# Patient Record
Sex: MALE | Race: WHITE | HISPANIC OR LATINO | Employment: OTHER | URBAN - METROPOLITAN AREA
[De-identification: names, ages, dates, MRNs, and addresses within clinical notes are randomized per-mention and may not be internally consistent; named-entity substitution may affect disease eponyms.]

---

## 2017-01-20 DIAGNOSIS — R42 VERTIGO: ICD-10-CM

## 2017-01-20 DIAGNOSIS — G50.0 TRIGEMINAL NEURALGIA OF LEFT SIDE OF FACE: ICD-10-CM

## 2017-01-20 RX ORDER — GABAPENTIN 300 MG/1
300 CAPSULE ORAL 3 TIMES DAILY
Qty: 90 CAPSULE | Refills: 11 | Status: SHIPPED | OUTPATIENT
Start: 2017-01-20 | End: 2017-04-21

## 2017-01-20 RX ORDER — MECLIZINE HCL 12.5 MG 12.5 MG/1
12.5 TABLET ORAL 2 TIMES DAILY PRN
Qty: 60 TABLET | Refills: 6 | Status: SHIPPED | OUTPATIENT
Start: 2017-01-20 | End: 2017-05-17 | Stop reason: SDUPTHER

## 2017-02-03 ENCOUNTER — OFFICE VISIT (OUTPATIENT)
Dept: PAIN MEDICINE | Facility: CLINIC | Age: 82
End: 2017-02-03
Attending: ANESTHESIOLOGY
Payer: MEDICARE

## 2017-02-03 VITALS
WEIGHT: 178.63 LBS | TEMPERATURE: 98 F | HEART RATE: 75 BPM | HEIGHT: 66 IN | SYSTOLIC BLOOD PRESSURE: 143 MMHG | DIASTOLIC BLOOD PRESSURE: 76 MMHG | BODY MASS INDEX: 28.71 KG/M2 | RESPIRATION RATE: 20 BRPM

## 2017-02-03 DIAGNOSIS — G50.0 TRIGEMINAL NEURALGIA: Primary | ICD-10-CM

## 2017-02-03 PROCEDURE — 99204 OFFICE O/P NEW MOD 45 MIN: CPT | Mod: S$PBB,,, | Performed by: ANESTHESIOLOGY

## 2017-02-03 PROCEDURE — 99999 PR PBB SHADOW E&M-EST. PATIENT-LVL III: CPT | Mod: PBBFAC,,, | Performed by: ANESTHESIOLOGY

## 2017-02-03 PROCEDURE — 99213 OFFICE O/P EST LOW 20 MIN: CPT | Mod: PBBFAC | Performed by: ANESTHESIOLOGY

## 2017-02-03 RX ORDER — GABAPENTIN 600 MG/1
TABLET ORAL
Qty: 90 TABLET | Refills: 11 | Status: SHIPPED | OUTPATIENT
Start: 2017-02-03 | End: 2017-04-21

## 2017-02-03 NOTE — PROGRESS NOTES
Chronic Pain - New Consult    Referring Physician: Referral, Self    Chief Complaint:   Chief Complaint   Patient presents with    Facial Pain     forehead to jaw        SUBJECTIVE: Disclaimer: This note has been generated using voice-recognition software. There may be typographical errors that have been missed during proof-reading    Initial encounter:    Norman Saunders presents to the clinic for the evaluation of trigeminal neuralgia pain. The pain started 2 years ago insidiously (no herpes zoster) and symptoms have been unchanged.    Brief history:    Pain Description:    The pain is located in the left side of the face, periorbitally and over the maxilla V2 distribution      At BEST  8/10     At WORST  10/10 on the WORST day.      On average pain is rated as 8/10.     Today the pain is rated as 8/10    The pain is described as burning, tingling and intermittent      Symptoms interfere with daily activity and sleeping.     Exacerbating factors: Touching.      Mitigating factors nothing.     Patient denies urinary incontinence, bowel incontinence and significant weight loss.  Patient denies any suicidal or homicidal ideations    Pain Medications:  Current:  Gabapentin 600mg at night    Tried in Past:  NSAIDs -Never  TCA -Never  SNRI -Never  Anti-convulsants -Never  Muscle Relaxants -Never  Opioids-Never    Physical Therapy/Home Exercise: no       report:  Reviewed and consistent with medication use as prescribed.    Pain Procedures: none    Chiropractor -never  Acupuncture - never  TENS unit -never  Spinal decompression -never  Joint replacement -never    Imaging: none available for review today    Past Medical History   Diagnosis Date    Cataract     GERD (gastroesophageal reflux disease)      Past Surgical History   Procedure Laterality Date    Cataract extraction       Social History     Social History    Marital status:      Spouse name: N/A    Number of children: N/A    Years of education:  N/A     Occupational History    Not on file.     Social History Main Topics    Smoking status: Never Smoker    Smokeless tobacco: Not on file    Alcohol use No    Drug use: No    Sexual activity: Not on file     Other Topics Concern    Not on file     Social History Narrative     Family History   Problem Relation Age of Onset    Amblyopia Neg Hx     Blindness Neg Hx     Cancer Neg Hx     Cataracts Neg Hx     Diabetes Neg Hx     Glaucoma Neg Hx     Hypertension Neg Hx     Macular degeneration Neg Hx     Retinal detachment Neg Hx     Strabismus Neg Hx     Stroke Neg Hx     Thyroid disease Neg Hx        Review of patient's allergies indicates:  No Known Allergies    Current Outpatient Prescriptions   Medication Sig    gabapentin (NEURONTIN) 300 MG capsule Take 1 capsule (300 mg total) by mouth 3 (three) times daily.    meclizine (ANTIVERT) 12.5 mg tablet Take 1 tablet (12.5 mg total) by mouth 2 (two) times daily as needed.    omeprazole (PRILOSEC) 20 MG capsule Take 1 capsule (20 mg total) by mouth once daily.    omeprazole (PRILOSEC) 20 MG capsule Take 1 capsule (20 mg total) by mouth once daily.    clotrimazole (LOTRIMIN) 1 % cream Apply topically 2 (two) times daily.    clotrimazole-betamethasone 1-0.05% (LOTRISONE) cream APPLY ONE CREAM EXTERNALLY TWICE DAILY AFFECTED AREA    diclofenac sodium (VOLTAREN) 1 % Gel Apply 2 g topically once daily.    econazole nitrate 1 % cream Apply topically 2 (two) times daily. To affected area x 4 wks    gabapentin (NEURONTIN) 600 MG tablet Increase to 1 tablet in AM and 1 in PM for 7 days, then increase to 1 tablet three times a day    prednisoLONE acetate (PRED FORTE) 1 % ophthalmic suspension 1 drop 4 (four) times daily.     No current facility-administered medications for this visit.        REVIEW OF SYSTEMS:    GENERAL:  No weight loss, malaise or fevers.  HEENT:   No recent changes in vision or hearing  NECK:  Negative for lumps, no difficulty  "with swallowing.  RESPIRATORY:  Negative for cough, wheezing or shortness of breath, patient denies any recent URI.  CARDIOVASCULAR:  Negative for chest pain, leg swelling or palpitations.  GI:  Negative for abdominal discomfort, blood in stools or black stools or change in bowel habits. GERD controlled with omeprazole.  MUSCULOSKELETAL:  See HPI.  SKIN:  Negative for lesions, rash, and itching.  PSYCH:  No mood disorder or recent psychosocial stressors.  Patients sleep is not disturbed secondary to pain.  HEMATOLOGY/LYMPHOLOGY:  Negative for prolonged bleeding, bruising easily or swollen nodes.  Patient is not currently taking any anti-coagulants  ENDO: No history of diabetes or thyroid dysfunction  NEURO:   No history of headaches, syncope, paralysis, seizures or tremors.  All other reviewed and negative other than HPI.    OBJECTIVE:    Visit Vitals    BP (!) 143/76    Pulse 75    Temp 98 °F (36.7 °C) (Oral)    Resp 20    Ht 5' 6" (1.676 m)    Wt 81 kg (178 lb 9.6 oz)    BMI 28.83 kg/m2       PHYSICAL EXAMINATION:    GENERAL: Well appearing, in no acute distress, alert and oriented x3.  PSYCH:  Mood and affect appropriate.  SKIN: Skin color, texture, turgor normal, no rashes or lesions.  HEAD/FACE:  Normocephalic, atraumatic. No pain to palpation over the area or evidence of zoster rash. Cranial nerves grossly intact.  NECK: No pain to palpation over the cervical paraspinous muscles. Spurling Negative. No pain with neck flexion, extension, or lateral flexion.   CV: RRR with palpation of the radial artery.  PULM: No evidence of respiratory difficulty, symmetric chest rise.  EXTREMITIES: Peripheral joint ROM is full and pain free without obvious instability or laxity in all four extremities. No deformities, edema, or skin discoloration. Good capillary refill.  MUSCULOSKELETAL: Shoulder  provocative maneuvers are negative.  There is no pain with palpation over the sacroiliac joints bilaterally.  FABERs test is " negative.  FADIRs test is negative.   Bilateral upper and lower extremity strength is normal and symmetric.  No atrophy or tone abnormalities are noted.  NEURO: Bilateral upper and lower extremity coordination and muscle stretch reflexes are physiologic and symmetric.  Plantar response are downgoing. No clonus.  No loss of sensation is noted.  GAIT: normal.    ASSESSMENT: 84 y.o. year old male with pain, consistent with     Encounter Diagnosis   Name Primary?    Trigeminal neuralgia Yes       PLAN:   Topical compounded pain cream    Escalate gabapentin to 1800 mg per day    Follow-up in 2 weeks, if there is no improvement we will set the patient up for a V2 distribution injection  The above plan and management options were discussed at length with patient. Patient is in agreement with the above and verbalized understanding. It will be communicated with the referring physician via electronic record, fax, or mail.    Sajan Truong  02/03/2017

## 2017-02-03 NOTE — MR AVS SNAPSHOT
[facility]  [address]  [place] [address]  Phone: [phone]  Fax: [phone]                  Norman Saunders   2/3/2017 11:30 AM   Office Visit    Descripción:  Male : 7/15/1932   Personal Médico:  Sajan Truong MD   Departamento:  [facility]           Razón de la azalia     Facial Pain                Lista de tareas           Citas próximas        Personal Médico Departamento Tfno del dpto    2017 11:00 AM MD [facility] [phone]      Metas (5 Years of Data)     Ninguna      Recetas para recoger        Disp Refills Start End    gabapentin (NEURONTIN) 600 MG tablet 90 tablet 11 2/3/2017     Increase to 1 tablet in AM and 1 in PM for 7 days, then increase to 1 tablet three times a day      [facility]     [facility] - Asistencia   Enfermeras registradas de [facility] pueden ayudarle a reservar krzysztof azalia, proveer educación para la rah, asesoría clínica, y otros servicios de asesoramiento.   Llame para qi servicio gratuito a [phone].             Medicamentos           Mensaje sobre Medicamentos     Verificar los cambios y / o adiciones a herr régimen de medicación son los mismos que discutir con herr médico. Si cualquiera de estos cambios o adiciones son incorrectos, por favor notifique a herr proveedor de atención médica.        EMPEZAR a elena estos medicamentos NUEVOS        Refills    gabapentin (NEURONTIN) 600 MG tablet 11    Sig: Increase to 1 tablet in AM and 1 in PM for 7 days, then increase to 1 tablet three times a day    Categoría: Print           Verifique que la siguiente lista de medicamentos es krzysztof representación exacta de los medicamentos que está tomando actualmente. Si no hay ningunos reportados, la lista puede estar en venegas. Si no es correcta, por favor póngase en contacto con herr proveedor de atención médica. Lleve esta lista con usted en berkley de emergencia.  "          Medicamentos Actuales     gabapentin (NEURONTIN) 300 MG capsule Take 1 capsule (300 mg total) by mouth 3 (three) times daily.    meclizine (ANTIVERT) 12.5 mg tablet Take 1 tablet (12.5 mg total) by mouth 2 (two) times daily as needed.    omeprazole (PRILOSEC) 20 MG capsule Take 1 capsule (20 mg total) by mouth once daily.    omeprazole (PRILOSEC) 20 MG capsule Take 1 capsule (20 mg total) by mouth once daily.    clotrimazole (LOTRIMIN) 1 % cream Apply topically 2 (two) times daily.    clotrimazole-betamethasone 1-0.05% (LOTRISONE) cream APPLY ONE CREAM EXTERNALLY TWICE DAILY AFFECTED AREA    diclofenac sodium (VOLTAREN) 1 % Gel Apply 2 g topically once daily.    econazole nitrate 1 % cream Apply topically 2 (two) times daily. To affected area x 4 wks    gabapentin (NEURONTIN) 600 MG tablet Increase to 1 tablet in AM and 1 in PM for 7 days, then increase to 1 tablet three times a day    prednisoLONE acetate (PRED FORTE) 1 % ophthalmic suspension 1 drop 4 (four) times daily.           Información de referencia clínica           Susana signos vitales gray     PS Pulso Temperatura Resp [place] Peso    143/76 75 98 °F (36.7 °C) (Oral) 20 5' 6" (1.676 m) 81 kg (178 lb 9.6 oz)    BMI (IMC)                   28.83 kg/m2           Blood Pressure          Most Recent Value    BP  (!)  143/76      Alergias     A partir del:  2/3/2017        No Known Allergies      Vacunas     Administradas en la fecha de la visita:  2/3/2017        None      Registrarse para [facility]     La activación de herr cuenta [facility] es tan fácil randy 1-2-3!    1) Ir a [email or web address], seleccione Registrarse Ahora, meter el código de activación y herr fecha de nacimiento, y seleccione Próximo.    [id number]  Expires: 3/20/2017 12:15 PM      2) Crear un nombre de usuario y contraseña para usar cuando se visita [facility] en el futuro y selecciona krzysztof pregunta de seguridad en berkley de que pierda herr contraseña y seleccione Próximo.    3) " Introduzca herr dirección de correo electrónico y betty silas en Registrarse!    Información Adicional  Si tiene alguna pregunta, por favor, e-mail [email or web address] o llame al [phone] para hablar con nuestro personal. Recuerde, [facility] no debe ser usada para necesidades urgentes. En berkley de emergencia médica, llluis al [phone].        Language Assistance Services     ATTENTION: Language assistance services are available, free of charge. Please call [phone].      ATENCIÓN: Si habla español, tiene a herr disposición servicios gratuitos de asistencia lingüística. Llame al [phone].     CHÚ Ý: N?u b?n nói Ti?ng Vi?t, có các d?ch v? h? tr? ngôn ng? mi?n phí dành cho b?n. G?i s? [phone].         [facility] cumple con las leyes federales aplicables de derechos civiles y no discrimina por motivos de gloria, color, origen nacional, edad, discapacidad, o sexo.                 Norman Saunders   2/3/2017 11:30 AM   Office Visit    Description:  Male : 7/15/1932   Provider:  Sajan Truong MD   Department:  [facility]           Reason for Visit     Facial Pain                To Do List           Future Appointments        Provider Department Dept Phone    2017 11:00 AM Sajan Truong MD [facility] [phone]      Goals     None       These Medications        Disp Refills Start End    gabapentin (NEURONTIN) 600 MG tablet 90 tablet 11 2/3/2017     Increase to 1 tablet in AM and 1 in PM for 7 days, then increase to 1 tablet three times a day      [facility]     [facility] On Call Nurse Care Line -  Assistance  Registered nurses in the [facility] On Call Center provide clinical advisement, health education, appointment booking, and other advisory services.  Call for this free service at [phone].             Medications           Message regarding Medications     Verify the changes and/or additions to your medication regime listed below  "are the same as discussed with your clinician today.  If any of these changes or additions are incorrect, please notify your healthcare provider.        START taking these NEW medications        Refills    gabapentin (NEURONTIN) 600 MG tablet 11    Sig: Increase to 1 tablet in AM and 1 in PM for 7 days, then increase to 1 tablet three times a day    Class: Print           Verify that the below list of medications is an accurate representation of the medications you are currently taking.  If none reported, the list may be blank. If incorrect, please contact your healthcare provider. Carry this list with you in case of emergency.           Current Medications     gabapentin (NEURONTIN) 300 MG capsule Take 1 capsule (300 mg total) by mouth 3 (three) times daily.    meclizine (ANTIVERT) 12.5 mg tablet Take 1 tablet (12.5 mg total) by mouth 2 (two) times daily as needed.    omeprazole (PRILOSEC) 20 MG capsule Take 1 capsule (20 mg total) by mouth once daily.    omeprazole (PRILOSEC) 20 MG capsule Take 1 capsule (20 mg total) by mouth once daily.    clotrimazole (LOTRIMIN) 1 % cream Apply topically 2 (two) times daily.    clotrimazole-betamethasone 1-0.05% (LOTRISONE) cream APPLY ONE CREAM EXTERNALLY TWICE DAILY AFFECTED AREA    diclofenac sodium (VOLTAREN) 1 % Gel Apply 2 g topically once daily.    econazole nitrate 1 % cream Apply topically 2 (two) times daily. To affected area x 4 wks    gabapentin (NEURONTIN) 600 MG tablet Increase to 1 tablet in AM and 1 in PM for 7 days, then increase to 1 tablet three times a day    prednisoLONE acetate (PRED FORTE) 1 % ophthalmic suspension 1 drop 4 (four) times daily.           Clinical Reference Information           Your Vitals Were     BP Pulse Temp Resp Height Weight    143/76 75 98 °F (36.7 °C) (Oral) 20 5' 6" (1.676 m) 81 kg (178 lb 9.6 oz)    BMI                   28.83 kg/m2           Blood Pressure          Most Recent Value    BP  (!)  143/76      Allergies as of " 2/3/2017     No Known Allergies      Immunizations Administered on Date of Encounter - 2/3/2017     None      [facility] Sign-Up     Activating your [facility] account is as easy as 1-2-3!     1) Visit [email or web address], select Sign Up Now, enter this activation code and your date of birth, then select Next.  [email or web address]  Expires: 3/20/2017 12:15 PM      2) Create a username and password to use when you visit [facility] in the future and select a security question in case you lose your password and select Next.    3) Enter your e-mail address and click Sign Up!    Additional Information  If you have questions, please e-mail [email or web address] or call [phone] to talk to our [facility] staff. Remember, [facility] is NOT to be used for urgent needs. For medical emergencies, dial 911.         Language Assistance Services     ATTENTION: Language assistance services are available, free of charge. Please call [phone].      ATENCIÓN: Si habla sunday, tiene a herr disposición servicios gratuitos de asistencia lingüística. Llame al [phone].     CHÚ Ý: N?u b?n nói Ti?ng Vi?t, có các d?ch v? h? tr? ngôn ng? mi?n phí dành cho b?n. G?i s? [phone].         [facility] complies with applicable Federal civil rights laws and does not discriminate on the basis of race, color, national origin, age, disability, or sex.

## 2017-04-07 ENCOUNTER — OFFICE VISIT (OUTPATIENT)
Dept: PAIN MEDICINE | Facility: CLINIC | Age: 82
End: 2017-04-07
Attending: ANESTHESIOLOGY
Payer: MEDICARE

## 2017-04-07 VITALS
HEART RATE: 69 BPM | BODY MASS INDEX: 29.76 KG/M2 | RESPIRATION RATE: 20 BRPM | TEMPERATURE: 99 F | WEIGHT: 185.19 LBS | DIASTOLIC BLOOD PRESSURE: 80 MMHG | HEIGHT: 66 IN | SYSTOLIC BLOOD PRESSURE: 151 MMHG

## 2017-04-07 DIAGNOSIS — G50.0 TRIGEMINAL NEURALGIA: Primary | ICD-10-CM

## 2017-04-07 PROCEDURE — 99213 OFFICE O/P EST LOW 20 MIN: CPT | Mod: S$PBB,,, | Performed by: NURSE PRACTITIONER

## 2017-04-07 PROCEDURE — 99999 PR PBB SHADOW E&M-EST. PATIENT-LVL III: CPT | Mod: PBBFAC,,, | Performed by: NURSE PRACTITIONER

## 2017-04-07 PROCEDURE — 99213 OFFICE O/P EST LOW 20 MIN: CPT | Mod: PBBFAC | Performed by: NURSE PRACTITIONER

## 2017-04-07 NOTE — MR AVS SNAPSHOT
[facility]  [address]  [place] [address]  Phone: [phone]  Fax: [phone]                  Norman Saunders   2017 1:00 PM   Office Visit    Descripción:  Male : 7/15/1932   Personal Médico:  Nancy Todd NP   Departamento:  [facility]           Razón de la azalia     Headache           Diagnósticos de Esta Visita        Comentarios    Trigeminal neuralgia    -  Primario            Lista de tareas           Citas próximas        Personal Médico Departamento Tfno del dpto    2017 1:40 PM Roverto Carias MD [facility] - Neurology [phone]      Metas (5 Years of Data)     Ninguna      [facility] en Llamada     [facility] En Llamada Línea de Enfermeras - Asistencia   Enfermeras registradas de [facility] pueden ayudarle a reservar krzysztof azalia, proveer educación para la rah, asesoría clínica, y otros servicios de asesoramiento.   Llame para qi servicio gratuito a [phone].             Medicamentos           Mensaje sobre Medicamentos     Verificar los cambios y / o adiciones a herr régimen de medicación son los mismos que discutir con herr médico. Si cualquiera de estos cambios o adiciones son incorrectos, por favor notifique a herr proveedor de atención médica.             Verifique que la siguiente lista de medicamentos es krzysztof representación exacta de los medicamentos que está tomando actualmente. Si no hay ningunos reportados, la lista puede estar en venegas. Si no es correcta, por favor póngase en contacto con herr proveedor de atención médica. Lleve esta lista con usted en berkley de emergencia.           Medicamentos Actuales     gabapentin (NEURONTIN) 300 MG capsule Take 1 capsule (300 mg total) by mouth 3 (three) times daily.    gabapentin (NEURONTIN) 600 MG tablet Increase to 1 tablet in AM and 1 in PM for 7 days, then increase to 1 tablet three times a day    meclizine (ANTIVERT) 12.5 mg tablet Take 1 tablet (12.5 mg total) by mouth 2 (two) times  "daily as needed.    omeprazole (PRILOSEC) 20 MG capsule Take 1 capsule (20 mg total) by mouth once daily.    omeprazole (PRILOSEC) 20 MG capsule Take 1 capsule (20 mg total) by mouth once daily.    clotrimazole (LOTRIMIN) 1 % cream Apply topically 2 (two) times daily.    clotrimazole-betamethasone 1-0.05% (LOTRISONE) cream APPLY ONE CREAM EXTERNALLY TWICE DAILY AFFECTED AREA    diclofenac sodium (VOLTAREN) 1 % Gel Apply 2 g topically once daily.    econazole nitrate 1 % cream Apply topically 2 (two) times daily. To affected area x 4 wks    prednisoLONE acetate (PRED FORTE) 1 % ophthalmic suspension 1 drop 4 (four) times daily.           Información de referencia clínica           Susana signos vitales gray     PS Pulso Temperatura Resp [place] Peso    151/80 69 98.5 °F (36.9 °C) (Oral) 20 5' 6" (1.676 m) 84 kg (185 lb 3.2 oz)    BMI ([facility])                   29.89 kg/m2           Blood Pressure          Most Recent Value    BP  (!)  151/80      Alergias     A partir del:  4/7/2017        No Known Allergies      Vacunas     Administradas en la fecha de la visita:  4/7/2017        None      Orders Placed During Today's Visit      Órdenes normales de esta visita    Ambulatory consult to Neurology       Registrarse para [facility]     La activación de herr cuenta [facility] es tan fácil randy 1-2-3!    1) Ir a [email or web address], seleccione Registrarse Ahora, meter el código de activación y herr fecha de nacimiento, y seleccione Próximo.    [id number]  Expires: 5/22/2017  1:50 PM      2) Crear un nombre de usuario y contraseña para usar cuando se visita [facility] en el futuro y selecciona krzysztof pregunta de seguridad en berkley de que pierda herr contraseña y seleccione Próximo.    3) Introduzca herr dirección de correo electrónico y betty clic en Registrarse!    Información Adicional  Si tiene alguna pregunta, por favor, e-mail [email or web address] o llame al [phone] para hablar con nuestro personal. Recuerde, [facility] no debe ser " usada para necesidades urgentes. En berkley de emergencia médica, llame al 911.        Instrucciones    Increase Gabapentin to:  1 pill in AM  1 pill in afternoon  2 pills at bedtime.        Language Assistance Services     ATTENTION: Language assistance services are available, free of charge. Please call [phone].      ATENCIÓN: Si habla español, tiene a herr disposición servicios gratuitos de asistencia lingüística. Llame al [phone].     CHÚ Ý: N?u b?n nói Ti?ng Vi?t, có các d?ch v? h? tr? ngôn ng? mi?n phí dành cho b?n. G?i s? [phone].         [facility] cumple con las leyes federales aplicables de derechos civiles y no discrimina por motivos de gloria, color, origen nacional, edad, discapacidad, o sexo.                 Norman Saunders   2017 1:00 PM   Office Visit    Description:  Male : 7/15/1932   Provider:  Nancy Todd NP   Department:  [facility]           Reason for Visit     Headache           Diagnoses this Visit        Comments    Trigeminal neuralgia    -  Primary            To Do List           Future Appointments        Provider Department Dept Phone    2017 1:40 PM Roverto Carias MD [facility] - Neurology [phone]      Goals     None      [facility]     [facility] Nurse Care Line -  Assistance  Unless otherwise directed by your provider, please contact [facility], our nurse care line that is available for  assistance.     Registered nurses in the [facility] provide: appointment scheduling, clinical advisement, health education, and other advisory services.  Call: [phone] (toll free)               Medications           Message regarding Medications     Verify the changes and/or additions to your medication regime listed below are the same as discussed with your clinician today.  If any of these changes or additions are incorrect, please notify your healthcare provider.             Verify that the  "below list of medications is an accurate representation of the medications you are currently taking.  If none reported, the list may be blank. If incorrect, please contact your healthcare provider. Carry this list with you in case of emergency.           Current Medications     gabapentin (NEURONTIN) 300 MG capsule Take 1 capsule (300 mg total) by mouth 3 (three) times daily.    gabapentin (NEURONTIN) 600 MG tablet Increase to 1 tablet in AM and 1 in PM for 7 days, then increase to 1 tablet three times a day    meclizine (ANTIVERT) 12.5 mg tablet Take 1 tablet (12.5 mg total) by mouth 2 (two) times daily as needed.    omeprazole (PRILOSEC) 20 MG capsule Take 1 capsule (20 mg total) by mouth once daily.    omeprazole (PRILOSEC) 20 MG capsule Take 1 capsule (20 mg total) by mouth once daily.    clotrimazole (LOTRIMIN) 1 % cream Apply topically 2 (two) times daily.    clotrimazole-betamethasone 1-0.05% (LOTRISONE) cream APPLY ONE CREAM EXTERNALLY TWICE DAILY AFFECTED AREA    diclofenac sodium (VOLTAREN) 1 % Gel Apply 2 g topically once daily.    econazole nitrate 1 % cream Apply topically 2 (two) times daily. To affected area x 4 wks    prednisoLONE acetate (PRED FORTE) 1 % ophthalmic suspension 1 drop 4 (four) times daily.           Clinical Reference Information           Your Vitals Were     BP Pulse Temp Resp Height Weight    151/80 69 98.5 °F (36.9 °C) (Oral) 20 5' 6" (1.676 m) 84 kg (185 lb 3.2 oz)    BMI                   29.89 kg/m2           Blood Pressure          Most Recent Value    BP  (!)  151/80      Allergies as of 4/7/2017     No Known Allergies      Immunizations Administered on Date of Encounter - 4/7/2017     None      Orders Placed During Today's Visit      Normal Orders This Visit    Ambulatory consult to Neurology       [facility] Sign-Up     Activating your [facility] account is as easy as 1-2-3!     1) Visit [email or web address], select Sign Up Now, enter this activation code and your date of birth, " then select Next.  [email or web address]  Expires: 5/22/2017  1:50 PM      2) Create a username and password to use when you visit [facility] in the future and select a security question in case you lose your password and select Next.    3) Enter your e-mail address and click Sign Up!    Additional Information  If you have questions, please e-mail [email or web address] or call [phone] to talk to our [facility] staff. Remember, [facility] is NOT to be used for urgent needs. For medical emergencies, dial 911.         Instructions    Increase Gabapentin to:  1 pill in AM  1 pill in afternoon  2 pills at bedtime.        Language Assistance Services     ATTENTION: Language assistance services are available, free of charge. Please call [phone].      ATENCIÓN: Si habla español, tiene a herr disposición servicios gratuitos de asistencia lingüística. Llame al [phone].     CHÚ Ý: N?u b?n nói Ti?ng Vi?t, có các d?ch v? h? tr? ngôn ng? mi?n phí dành cho b?n. G?i s? [phone].         [facility] complies with applicable Federal civil rights laws and does not discriminate on the basis of race, color, national origin, age, disability, or sex.

## 2017-04-21 ENCOUNTER — OFFICE VISIT (OUTPATIENT)
Dept: NEUROLOGY | Facility: CLINIC | Age: 82
End: 2017-04-21
Payer: MEDICARE

## 2017-04-21 VITALS
BODY MASS INDEX: 30.01 KG/M2 | WEIGHT: 186.75 LBS | SYSTOLIC BLOOD PRESSURE: 130 MMHG | HEIGHT: 66 IN | HEART RATE: 76 BPM | DIASTOLIC BLOOD PRESSURE: 73 MMHG

## 2017-04-21 DIAGNOSIS — G50.0 TRIGEMINAL NEURALGIA: Primary | ICD-10-CM

## 2017-04-21 PROCEDURE — 99213 OFFICE O/P EST LOW 20 MIN: CPT | Mod: PBBFAC | Performed by: PSYCHIATRY & NEUROLOGY

## 2017-04-21 PROCEDURE — 99999 PR PBB SHADOW E&M-EST. PATIENT-LVL III: CPT | Mod: PBBFAC,,, | Performed by: PSYCHIATRY & NEUROLOGY

## 2017-04-21 PROCEDURE — 99205 OFFICE O/P NEW HI 60 MIN: CPT | Mod: S$PBB,,, | Performed by: PSYCHIATRY & NEUROLOGY

## 2017-04-21 RX ORDER — GABAPENTIN 800 MG/1
800 TABLET ORAL 3 TIMES DAILY
Qty: 90 TABLET | Refills: 11 | Status: SHIPPED | OUTPATIENT
Start: 2017-04-21 | End: 2018-04-26 | Stop reason: SDUPTHER

## 2017-04-21 NOTE — LETTER
April 21, 2017      Nancy Todd, NP  [address] [facility]  [address]  [facility] [address]           [facility]  [address]  [place] [address]  Phone: [phone]  Fax: [phone]          Patient: Norman Saunders   MR Number: [id number]   YOB: 1932   Date of Visit: 4/21/2017       Dear Nancy Todd:    Thank you for referring Norman Saunedrs to me for evaluation. Attached you will find relevant portions of my assessment and plan of care.    If you have questions, please do not hesitate to call me. I look forward to following Norman Saunders along with you.    Sincerely,    Roverto Carias MD    Enclosure  CC:  No Recipients    If you would like to receive this communication electronically, please contact [email or web address] or [phone] to request more information on [facility] Link access.    For providers and/or their staff who would like to refer a patient to [facility], please contact us through our one-stop-shop provider referral line, [facility], at [phone].    If you feel you have received this communication in error or would no longer like to receive these types of communications, please e-mail [email or web address]

## 2017-04-21 NOTE — MR AVS SNAPSHOT
[facility]  [address]  [place] [address]  Phone: [phone]  Fax: [phone]                  Norman Saunders   2017 1:40 PM   Office Visit    Descripción:  Male : 7/15/1932   Personal Médico:  Roverto Carias MD   Departamento:  [facility]           Razón de la azalia     Consult           Diagnósticos de Esta Visita        Comentarios    Trigeminal neuralgia    -  Primario            Lista de tareas           Metas (5 Years of Data)     Ninguna      Recetas para recoger        Disp Refills Start End    gabapentin (NEURONTIN) 800 MG tablet 90 tablet 11 2017    Take 1 tablet (800 mg total) by mouth 3 (three) times daily. - Oral    [facility]: [id number] - [place], [place] - [address] AT [facility] No[address] de tlfo: #: [phone]         [facility] en Llamada     [facility] En Llamada Línea de Enfermeras - Asistencia   Enfermeras registradas de [facility] pueden ayudarle a reservar krzysztof azalia, proveer educación para la rah, asesoría clínica, y otros servicios de asesoramiento.   Llame para qi servicio gratuito a [phone].             Medicamentos           Mensaje sobre Medicamentos     Verificar los cambios y / o adiciones a herr régimen de medicación son los mismos que discutir con herr médico. Si cualquiera de estos cambios o adiciones son incorrectos, por favor notifique a herr proveedor de atención médica.        EMPEZAR a elena estos medicamentos NUEVOS        Refills    gabapentin (NEURONTIN) 800 MG tablet 11    Sig: Take 1 tablet (800 mg total) by mouth 3 (three) times daily.    Categoría: Normal    Vía: Oral      DEJAR de elena estos medicamentos     gabapentin (NEURONTIN) 300 MG capsule Take 1 capsule (300 mg total) by mouth 3 (three) times daily.    gabapentin (NEURONTIN) 100 MG capsule Take 3 capsules (300 mg total) by mouth 2 (two) times daily.           Verifique que la siguiente lista de medicamentos es krzysztof  "representación exacta de los medicamentos que está tomando actualmente. Si no hay ningunos reportados, la lista puede estar en venegas. Si no es correcta, por favor póngase en contacto con herr proveedor de atención médica. Lleve esta lista con usted en berkley de emergencia.           Medicamentos Actuales     clotrimazole (LOTRIMIN) 1 % cream Apply topically 2 (two) times daily.    clotrimazole-betamethasone 1-0.05% (LOTRISONE) cream APPLY ONE CREAM EXTERNALLY TWICE DAILY AFFECTED AREA    diclofenac sodium (VOLTAREN) 1 % Gel Apply 2 g topically once daily.    econazole nitrate 1 % cream Apply topically 2 (two) times daily. To affected area x 4 wks    meclizine (ANTIVERT) 12.5 mg tablet Take 1 tablet (12.5 mg total) by mouth 2 (two) times daily as needed.    omeprazole (PRILOSEC) 20 MG capsule Take 1 capsule (20 mg total) by mouth once daily.    omeprazole (PRILOSEC) 20 MG capsule Take 1 capsule (20 mg total) by mouth once daily.    prednisoLONE acetate (PRED FORTE) 1 % ophthalmic suspension 1 drop 4 (four) times daily.    gabapentin (NEURONTIN) 800 MG tablet Take 1 tablet (800 mg total) by mouth 3 (three) times daily.           Información de referencia clínica           Susana signos vitales gray     PS Pulso [place] Peso BMI ([facility])       130/73 76 5' 6" (1.676 m) 84.7 kg (186 lb 11.7 oz) 30.14 kg/m2       Blood Pressure          Most Recent Value    BP  130/73      Alergias     A partir del:  4/21/2017        No Known Allergies      Vacunas     Administradas en la fecha de la visita:  4/21/2017        None      Orders Placed During Today's Visit     Exámenes/Procedimientos futuros Se espera el Vence    MRI Brain Without Contrast  4/21/2017 4/21/2018      Registrarse para [facility]     La activación de herr cuenta [facility] es tan fácil randy 1-2-3!    1) Ir a [email or web address], seleccione Registrarse Ahora, meter el código de activación y herr fecha de nacimiento, y seleccione Próximo.    [id number]  Expires: 5/22/2017  " 1:50 PM      2) Crear un nombre de usuario y contraseña para usar cuando se visita [facility] en el futuro y selecciona krzysztof pregunta de seguridad en berkley de que pierda herr contraseña y seleccione Próximo.    3) Introduzca herr dirección de correo electrónico y betty silas en Registrarse!    Información Adicional  Si tiene alguna pregunta, por favor, e-mail [email or web address] o llame al [phone] para hablar con nuestro personal. Recuerde, [facility] no debe ser usada para necesidades urgentes. En berkley de emergencia médica, llame al 911.        Language Assistance Services     ATTENTION: Language assistance services are available, free of charge. Please call [phone].      ATENCIÓN: Si habla español, tiene a herr disposición servicios gratuitos de asistencia lingüística. Llame al [phone].     CHÚ Ý: N?u b?n nói Ti?ng Vi?t, có các d?ch v? h? tr? ngôn ng? mi?n phí dành cho b?n. G?i s? [phone].         [facility] cumple con las leyes federales aplicables de derechos civiles y no discrimina por motivos de gloria, color, origen nacional, edad, discapacidad, o sexo.                 Norman Saunders   2017 1:40 PM   Office Visit    Description:  Male : 7/15/1932   Provider:  Roverto Carias MD   Department:  [facility]           Reason for Visit     Consult           Diagnoses this Visit        Comments    Trigeminal neuralgia    -  Primary            To Do List           Goals     None       These Medications        Disp Refills Start End    gabapentin (NEURONTIN) 800 MG tablet 90 tablet 11 2017    Take 1 tablet (800 mg total) by mouth 3 (three) times daily. - Oral    Pharmacy: [id number] - [place] - [address] AT [facility] #: [phone]         [facility]     [facility] Nurse Care Line -  Assistance  Unless otherwise directed by your provider, please contact [facility], our nurse care line that is  available for 24/7 assistance.     Registered nurses in the [facility] provide: appointment scheduling, clinical advisement, health education, and other advisory services.  Call: [phone] (toll free)               Medications           Message regarding Medications     Verify the changes and/or additions to your medication regime listed below are the same as discussed with your clinician today.  If any of these changes or additions are incorrect, please notify your healthcare provider.        START taking these NEW medications        Refills    gabapentin (NEURONTIN) 800 MG tablet 11    Sig: Take 1 tablet (800 mg total) by mouth 3 (three) times daily.    Class: Normal    Route: Oral      STOP taking these medications     gabapentin (NEURONTIN) 300 MG capsule Take 1 capsule (300 mg total) by mouth 3 (three) times daily.    gabapentin (NEURONTIN) 100 MG capsule Take 3 capsules (300 mg total) by mouth 2 (two) times daily.           Verify that the below list of medications is an accurate representation of the medications you are currently taking.  If none reported, the list may be blank. If incorrect, please contact your healthcare provider. Carry this list with you in case of emergency.           Current Medications     clotrimazole (LOTRIMIN) 1 % cream Apply topically 2 (two) times daily.    clotrimazole-betamethasone 1-0.05% (LOTRISONE) cream APPLY ONE CREAM EXTERNALLY TWICE DAILY AFFECTED AREA    diclofenac sodium (VOLTAREN) 1 % Gel Apply 2 g topically once daily.    econazole nitrate 1 % cream Apply topically 2 (two) times daily. To affected area x 4 wks    meclizine (ANTIVERT) 12.5 mg tablet Take 1 tablet (12.5 mg total) by mouth 2 (two) times daily as needed.    omeprazole (PRILOSEC) 20 MG capsule Take 1 capsule (20 mg total) by mouth once daily.    omeprazole (PRILOSEC) 20 MG capsule Take 1 capsule (20 mg total) by mouth once daily.    prednisoLONE acetate (PRED FORTE) 1 % ophthalmic  "suspension 1 drop 4 (four) times daily.    gabapentin (NEURONTIN) 800 MG tablet Take 1 tablet (800 mg total) by mouth 3 (three) times daily.           Clinical Reference Information           Your Vitals Were     BP Pulse Height Weight BMI       130/73 76 5' 6" (1.676 m) 84.7 kg (186 lb 11.7 oz) 30.14 kg/m2       Blood Pressure          Most Recent Value    BP  130/73      Allergies as of 4/21/2017     No Known Allergies      Immunizations Administered on Date of Encounter - 4/21/2017     None      Orders Placed During Today's Visit     Future Labs/Procedures Expected by Expires    MRI Brain Without Contrast  4/21/2017 4/21/2018      [facility] Sign-Up     Activating your [facility] account is as easy as 1-2-3!     1) Visit [email or web address], select Sign Up Now, enter this activation code and your date of birth, then select Next.  [email or web address]  Expires: 5/22/2017  1:50 PM      2) Create a username and password to use when you visit [facility] in the future and select a security question in case you lose your password and select Next.    3) Enter your e-mail address and click Sign Up!    Additional Information  If you have questions, please e-mail [email or web address] or call [phone] to talk to our [facility] staff. Remember, [facility] is NOT to be used for urgent needs. For medical emergencies, dial 911.         Language Assistance Services     ATTENTION: Language assistance services are available, free of charge. Please call [phone].      ATENCIÓN: Si habla español, tiene a herr disposición servicios gratuitos de asistencia lingüística. Llame al [phone].     KELSEY Ý: N?u b?n nói Ti?ng Vi?t, có các d?ch v? h? tr? ngôn ng? mi?n phí dành cho b?n. G?i s? [phone].         [facility] complies with applicable Federal civil rights laws and does not discriminate on the basis of race, color, national origin, age, disability, or sex.          "

## 2017-04-21 NOTE — PROGRESS NOTES
[facility] NEUROLOGY  [facility] [place]    Date: April 21, 2017   Patient Name: Norman Saunders   MRN: [id number]   PCP: Elis Turner  Referring Provider: Nancy Todd NP    Assessment:      This is Norman Saunders, 84 y.o. male with trigeminal neuralgia for four years referred from pain clinic after he declined injections.  We discussed etiology of TN and discussed alternatives such as LTG and CBZ, he prefers to continue on GBP only     Plan:      MRI brain to rule out compressive lesion  Continue GBP  Follow up as needed       I discussed side effects of the medications. I asked the patient to stop the medication if hhe notices serious adverse effects as we discussed and to seek immediate medical attention at an ER.     Roverto Carias MD  [facility]   Department of Neurology    Subjective:     HPI:   Mr. Norman Saunders is a 84 y.o. male who presents with a chief complaint of trigeminal neuralgia    He developed gradual onset left facial pain most prominent in V2 distribution about four years ago without significant recent worsening.  Reports it is constant without exacerbating/alleviating factors.  Was started on GBP with titration to 2400mg/day by PCP with partial relief.      PAST MEDICAL HISTORY:  Past Medical History:   Diagnosis Date    Cataract     GERD (gastroesophageal reflux disease)        PAST SURGICAL HISTORY:  Past Surgical History:   Procedure Laterality Date    CATARACT EXTRACTION         CURRENT MEDS:  Current Outpatient Prescriptions   Medication Sig Dispense Refill    clotrimazole (LOTRIMIN) 1 % cream Apply topically 2 (two) times daily. 45 g 1    clotrimazole-betamethasone 1-0.05% (LOTRISONE) cream APPLY ONE CREAM EXTERNALLY TWICE DAILY AFFECTED AREA 45 g 3    diclofenac sodium (VOLTAREN) 1 % Gel Apply 2 g topically once daily. 100 g 1    econazole nitrate 1 % cream Apply topically 2 (two) times daily. To affected area x 4 wks 85 g 3  "   meclizine (ANTIVERT) 12.5 mg tablet Take 1 tablet (12.5 mg total) by mouth 2 (two) times daily as needed. 60 tablet 6    omeprazole (PRILOSEC) 20 MG capsule Take 1 capsule (20 mg total) by mouth once daily. 90 capsule 2    omeprazole (PRILOSEC) 20 MG capsule Take 1 capsule (20 mg total) by mouth once daily. 90 capsule 3    prednisoLONE acetate (PRED FORTE) 1 % ophthalmic suspension 1 drop 4 (four) times daily.      gabapentin (NEURONTIN) 800 MG tablet Take 1 tablet (800 mg total) by mouth 3 (three) times daily. 90 tablet 11     No current facility-administered medications for this visit.        ALLERGIES:  Review of patient's allergies indicates:  No Known Allergies    FAMILY HISTORY:  Family History   Problem Relation Age of Onset    Amblyopia Neg Hx     Blindness Neg Hx     Cancer Neg Hx     Cataracts Neg Hx     Diabetes Neg Hx     Glaucoma Neg Hx     Hypertension Neg Hx     Macular degeneration Neg Hx     Retinal detachment Neg Hx     Strabismus Neg Hx     Stroke Neg Hx     Thyroid disease Neg Hx        SOCIAL HISTORY:  Social History   Substance Use Topics    Smoking status: Never Smoker    Smokeless tobacco: None    Alcohol use No       Review of Systems:  12 review of systems is negative except for the symptoms mentioned in HPI.        Objective:     Vitals:    04/21/17 1308   BP: 130/73   Pulse: 76   Weight: 84.7 kg (186 lb 11.7 oz)   Height: 5' 6" (1.676 m)       General: NAD, well nourished   Eyes: no tearing, discharge, no erythema   ENT: moist mucous membranes of the oral cavity, nares patent    Neck: Supple, full range of motion  Cardiovascular: Warm and well perfused, pulses equal and symmetrical  Lungs: Normal work of breathing, normal chest wall excursions  Skin: No rash, lesions, or breakdown on exposed skin  Psychiatry: Mood and affect are appropriate   Abdomen: soft, non tender, non distended  Extremeties: No cyanosis, clubbing or edema.    Neurological   MENTAL STATUS: Alert " and oriented to person, place, and time. Attention and concentration within normal limits. Speech without dysarthria, able to name and repeat without difficulty. Recent and remote memory within normal limits   CRANIAL NERVES: Visual fields intact. PERRL. EOMI. Facial sensation intact. Face symmetrical. Hearing grossly intact. Full shoulder shrug bilaterally. Tongue protrudes midline   SENSORY: Sensation is intact to light touch throughout.  Joint position perception intact. Negative Romberg.   MOTOR: Normal bulk and tone. No pronator drift.  5/5 deltoid, biceps, triceps, interosseous, hand  bilaterally. 5/5 iliopsoas, knee extension/flexion, foot dorsi/plantarflexion bilaterally.    REFLEXES:  Toes down going bilaterally.   CEREBELLAR/COORDINATION/GAIT: Gait steady with normal arm swing and stride length.  Heel to shin intact. Finger to nose intact. Normal rapid alternating movements.

## 2017-05-16 ENCOUNTER — HOSPITAL ENCOUNTER (OUTPATIENT)
Dept: RADIOLOGY | Facility: HOSPITAL | Age: 82
Discharge: HOME | End: 2017-05-16
Attending: PSYCHIATRY & NEUROLOGY
Payer: MEDICARE

## 2017-05-16 DIAGNOSIS — G50.0 TRIGEMINAL NEURALGIA: ICD-10-CM

## 2017-05-16 PROCEDURE — 70551 MRI BRAIN STEM W/O DYE: CPT | Mod: 26,GC,, | Performed by: RADIOLOGY

## 2017-05-16 PROCEDURE — 70551 MRI BRAIN STEM W/O DYE: CPT | Mod: TC

## 2017-05-17 DIAGNOSIS — R42 VERTIGO: ICD-10-CM

## 2017-05-17 DIAGNOSIS — K21.9 GASTROESOPHAGEAL REFLUX DISEASE, ESOPHAGITIS PRESENCE NOT SPECIFIED: ICD-10-CM

## 2017-05-18 RX ORDER — MECLIZINE HCL 12.5 MG 12.5 MG/1
12.5 TABLET ORAL 2 TIMES DAILY PRN
Qty: 60 TABLET | Refills: 6 | Status: SHIPPED | OUTPATIENT
Start: 2017-05-18 | End: 2019-04-05

## 2017-05-18 RX ORDER — OMEPRAZOLE 20 MG/1
20 CAPSULE, DELAYED RELEASE ORAL DAILY
Qty: 90 CAPSULE | Refills: 2 | Status: SHIPPED | OUTPATIENT
Start: 2017-05-18 | End: 2021-04-12

## 2017-05-19 RX ORDER — MECLIZINE HCL 12.5 MG 12.5 MG/1
TABLET ORAL
Qty: 60 TABLET | Refills: 0 | Status: SHIPPED | OUTPATIENT
Start: 2017-05-19 | End: 2019-04-05

## 2017-05-24 ENCOUNTER — TELEPHONE (OUTPATIENT)
Dept: NEUROLOGY | Facility: CLINIC | Age: 82
End: 2017-05-24

## 2017-05-24 NOTE — TELEPHONE ENCOUNTER
Spoke with patient Daughter Lillie stated that she was  Calling to get the MRI Result her  Father had Done on 05/16/2017

## 2018-01-31 RX ORDER — OMEPRAZOLE 20 MG/1
CAPSULE, DELAYED RELEASE ORAL
Qty: 90 CAPSULE | Refills: 2 | Status: SHIPPED | OUTPATIENT
Start: 2018-01-31 | End: 2018-12-14 | Stop reason: SDUPTHER

## 2018-04-26 DIAGNOSIS — G50.0 TRIGEMINAL NEURALGIA: ICD-10-CM

## 2018-04-26 RX ORDER — GABAPENTIN 800 MG/1
800 TABLET ORAL 3 TIMES DAILY
Qty: 90 TABLET | Refills: 8 | Status: SHIPPED | OUTPATIENT
Start: 2018-04-26 | End: 2019-03-28 | Stop reason: SDUPTHER

## 2018-06-07 ENCOUNTER — OFFICE VISIT (OUTPATIENT)
Dept: OPTOMETRY | Facility: CLINIC | Age: 83
End: 2018-06-07
Payer: MEDICARE

## 2018-06-07 DIAGNOSIS — Z96.1 PSEUDOPHAKIA: ICD-10-CM

## 2018-06-07 DIAGNOSIS — H43.393 VITREOUS SYNERESIS OF BOTH EYES: ICD-10-CM

## 2018-06-07 DIAGNOSIS — Z98.41 S/P CATARACT SURGERY, RIGHT: ICD-10-CM

## 2018-06-07 DIAGNOSIS — H52.203 ASTIGMATISM OF BOTH EYES, UNSPECIFIED TYPE: ICD-10-CM

## 2018-06-07 DIAGNOSIS — Z98.42 S/P CATARACT SURGERY, LEFT: ICD-10-CM

## 2018-06-07 DIAGNOSIS — H43.393 VITREOUS FLOATERS OF BOTH EYES: Primary | ICD-10-CM

## 2018-06-07 PROCEDURE — 92015 DETERMINE REFRACTIVE STATE: CPT | Mod: ,,, | Performed by: OPTOMETRIST

## 2018-06-07 PROCEDURE — 92014 COMPRE OPH EXAM EST PT 1/>: CPT | Mod: S$PBB,,, | Performed by: OPTOMETRIST

## 2018-06-07 PROCEDURE — 99212 OFFICE O/P EST SF 10 MIN: CPT | Mod: PBBFAC | Performed by: OPTOMETRIST

## 2018-06-07 PROCEDURE — 99999 PR PBB SHADOW E&M-EST. PATIENT-LVL II: CPT | Mod: PBBFAC,,, | Performed by: OPTOMETRIST

## 2018-06-07 NOTE — LETTER
Latricia 10, 2018      Arturo Stewart MD  [address]  [facility] [address]           [facility]  [address]  [facility] [address]  Phone: [phone]  Fax: [phone]          Patient: Norman Saunders   MR Number: [id number]   YOB: 1932   Date of Visit: 6/7/2018       Dear Dr. Arturo Stewart:    Thank you for referring Norman Saunders to me for evaluation. Attached you will find relevant portions of my assessment and plan of care.    If you have questions, please do not hesitate to call me. I look forward to following Norman Saunders along with you.    Sincerely,    Nathan Worley, OD    Enclosure  CC:  No Recipients    If you would like to receive this communication electronically, please contact [email or web address] or [phone] to request more information on [employer] access.    For providers and/or their staff who would like to refer a patient to [facility], please contact us through our one-stop-shop provider referral line, [facility], [place], at [phone].    If you feel you have received this communication in error or would no longer like to receive these types of communications, please e-mail [email or web address]

## 2018-06-07 NOTE — PROGRESS NOTES
HPI     Concerns About Ocular Health    Additional comments: Eye exam and refraction.    Wears glasses full-time  VA with glasses difficult distance and near.            Comments   82 yr old  male -  with  to translate             Approximate date of last eye examination: 4/2/2015  Name of last eye doctor seen:Dr. Worley    Wears glasses? yes     If yes, wears full-time or part-time?  Full time    Present glasses are bifocal / S V Distance / S V Reading:  bifocals  Approximate age of present glasses:  2 yrs  Got new glasses following last exam, or subsequently?:  no   Any problem with VA with glasses?  Patient would like a new rx for   glasses          Wears CLs?:  no  Headaches? no  Eye pain/discomfort? No                                                                             Flashes?  Yes, temporal OS occasionally  Floaters?  Yes, temporal OS occasionally  Diplopia/Double vision?  no  Patient's Ocular History:         Any eye surgeries? S/p cataract sx OU         Any eye injury?  no         Any treatment for eye disease?  No            Family history of eye disease?  none  Significant patient medical history:         1. Diabetes?  no   2. HBP?  no              3. Other (describe):  GERD   ! OTC eyedrops currently using:  no   ! Prescription eye meds currently using:  no   ! Any history of allergy/adverse reaction to any eye meds used   previously?  no    ! Any history of allergy/adverse reaction to eyedrops used during prior   eye exam(s)? no    ! Any history of allergy/adverse reaction to Novacaine or similar meds?   Doesn't  know   ! Any history of allergy/adverse reaction to Epinephrine or similar meds?   Doesn't know    ! Patient okay with use of anesthetic eyedrops to check eye pressure? yes            ! Patient okay with use of eyedrops to dilate pupils today? yes    !  Allergies/Medications/Medical History/Family History reviewed today?    yes      PD =  64/60  Desired reading  "distance = 14"                                                                      Last edited by Nathan Worley, OD on 6/7/2018  2:29 PM. (History)            Assessment /Plan     For exam results, see Encounter Report.    1. Vitreous floaters of both eyes     2. Vitreous syneresis of both eyes     3. S/P cataract surgery, left     4. S/P cataract surgery, right     5. Pseudophakia - Both Eyes     6. Astigmatism of both eyes, unspecified type                  S/P cataract surgery in both eyes. Posterior chamber intraocular lens in each eye.  Mild syneresis of vitreous in each eye, with floaters in each eye.    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  Recheck in one year - or prior if any problems noted in the interim.               "

## 2018-06-07 NOTE — PATIENT INSTRUCTIONS
S/P cataract surgery in both eyes. Posterior chamber intraocular lens in each eye.  Mild syneresis of vitreous in each eye, with floaters in each eye.    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  Recheck in one year - or prior if any problems noted in the interim.

## 2018-07-20 RX ORDER — MECLIZINE HCL 12.5 MG 12.5 MG/1
TABLET ORAL
Qty: 60 TABLET | Refills: 6 | Status: SHIPPED | OUTPATIENT
Start: 2018-07-20 | End: 2019-04-05 | Stop reason: SDUPTHER

## 2018-09-19 ENCOUNTER — OFFICE VISIT (OUTPATIENT)
Dept: INTERNAL MEDICINE | Facility: CLINIC | Age: 83
End: 2018-09-19
Payer: MEDICARE

## 2018-09-19 ENCOUNTER — HOSPITAL ENCOUNTER (OUTPATIENT)
Dept: RADIOLOGY | Facility: HOSPITAL | Age: 83
Discharge: HOME | End: 2018-09-19
Attending: INTERNAL MEDICINE
Payer: MEDICARE

## 2018-09-19 VITALS
BODY MASS INDEX: 29.12 KG/M2 | SYSTOLIC BLOOD PRESSURE: 130 MMHG | DIASTOLIC BLOOD PRESSURE: 74 MMHG | WEIGHT: 180.38 LBS | HEART RATE: 80 BPM

## 2018-09-19 DIAGNOSIS — R05.9 COUGH: ICD-10-CM

## 2018-09-19 DIAGNOSIS — R53.83 FATIGUE, UNSPECIFIED TYPE: ICD-10-CM

## 2018-09-19 DIAGNOSIS — K21.9 GASTROESOPHAGEAL REFLUX DISEASE, ESOPHAGITIS PRESENCE NOT SPECIFIED: ICD-10-CM

## 2018-09-19 DIAGNOSIS — R13.10 DYSPHAGIA, UNSPECIFIED TYPE: ICD-10-CM

## 2018-09-19 DIAGNOSIS — E78.5 DYSLIPIDEMIA: ICD-10-CM

## 2018-09-19 DIAGNOSIS — Z00.00 ROUTINE GENERAL MEDICAL EXAMINATION AT A HEALTH CARE FACILITY: ICD-10-CM

## 2018-09-19 DIAGNOSIS — Z00.00 ROUTINE GENERAL MEDICAL EXAMINATION AT A HEALTH CARE FACILITY: Primary | ICD-10-CM

## 2018-09-19 DIAGNOSIS — E55.9 VITAMIN D DEFICIENCY: ICD-10-CM

## 2018-09-19 PROCEDURE — 99213 OFFICE O/P EST LOW 20 MIN: CPT | Mod: PBBFAC,25 | Performed by: INTERNAL MEDICINE

## 2018-09-19 PROCEDURE — 71046 X-RAY EXAM CHEST 2 VIEWS: CPT | Mod: TC

## 2018-09-19 PROCEDURE — 99214 OFFICE O/P EST MOD 30 MIN: CPT | Mod: S$PBB,,, | Performed by: INTERNAL MEDICINE

## 2018-09-19 PROCEDURE — 71046 X-RAY EXAM CHEST 2 VIEWS: CPT | Mod: 26,,, | Performed by: RADIOLOGY

## 2018-09-19 PROCEDURE — 99999 PR PBB SHADOW E&M-EST. PATIENT-LVL III: CPT | Mod: PBBFAC,,, | Performed by: INTERNAL MEDICINE

## 2018-10-01 NOTE — PROGRESS NOTES
Subjective:       Patient ID: Norman Saunders is a 86 y.o. male.    Chief Complaint: Annual Exam    HPIPleasant gentleman originally from [facility] here for his annual exam. He informed me hat his wife of over 60 years recently passed away. She was also my patient and my condolences were expressed. Overall doing well, but has been having a cough especially a bedtime over the last 3 months. He is anon-smoker , but further questioning revealed he has been having some reflux associated with dysphagia to solids as well. I discussed his phenomena and the need to evaluate it more closely. I will obtain an EGD with possible dilatation if needed. I will also refer to ENT for evaluation as well. Otherwise doing well.  Review of Systems   Constitutional: Negative for activity change, appetite change, fatigue and unexpected weight change.   Eyes: Negative for visual disturbance.   Respiratory: Positive for cough. Negative for wheezing.    Gastrointestinal: Negative for abdominal distention, abdominal pain and blood in stool.   Genitourinary: Negative for difficulty urinating.   Musculoskeletal: Positive for back pain. Negative for arthralgias and joint swelling.   Neurological: Positive for numbness. Negative for dizziness and light-headedness.        Numbness of feet.   Hematological: Negative.        Objective:      Physical Exam   Constitutional: He is oriented to person, place, and time. He appears well-developed and well-nourished. No distress.   HENT:   Head: Normocephalic and atraumatic.   Right Ear: External ear normal.   Left Ear: External ear normal.   Mouth/Throat: Oropharynx is clear and moist. No oropharyngeal exudate.   Eyes: Conjunctivae and EOM are normal. Pupils are equal, round, and reactive to light. Right eye exhibits no discharge. Left eye exhibits no discharge. No scleral icterus.   Neck: Normal range of motion. Neck supple. No JVD present. No thyromegaly present.   Cardiovascular: Normal rate, regular  rhythm, normal heart sounds and intact distal pulses.   No murmur heard.  Pulmonary/Chest: Effort normal and breath sounds normal. No respiratory distress. He has no wheezes. He exhibits no tenderness.   Abdominal: Bowel sounds are normal. He exhibits no distension and no mass.   Musculoskeletal: Normal range of motion. He exhibits no edema or tenderness.   Neurological: He is alert and oriented to person, place, and time. No cranial nerve deficit. Coordination normal.   Skin: Skin is warm and dry. He is not diaphoretic. No erythema.   Psychiatric: He has a normal mood and affect. His behavior is normal. Judgment and thought content normal.   Nursing note and vitals reviewed.      Assessment:       1. Routine general medical examination at a health care facility    2. Dysphagia, unspecified type    3. Gastroesophageal reflux disease, esophagitis presence not specified    4. Fatigue, unspecified type    5. Vitamin D deficiency    6. Dyslipidemia    7. Cough        Plan:    1. Obtain labs.         2. CXR.         3. EGD with possible dilatation.         4. Refer to ENT.         5. RTC for review.

## 2018-10-16 ENCOUNTER — TELEPHONE (OUTPATIENT)
Dept: ENDOSCOPY | Facility: HOSPITAL | Age: 83
End: 2018-10-16

## 2018-10-16 NOTE — TELEPHONE ENCOUNTER
Attempted to schedule patient for his EGD with [employer].  Prisca Patel) with International Department assisted me with contacting patient with number in chart [phone], this number actually turned out to be his daughters, Lillie Arevalo. She stated she doesn't live with her father (patient). She wasn't familiar with his PMI information but does speak read English and will be the one helping him with his directions.    Once patient is schedule is prep instructions need to be mailed to his daughters house:  Lillie Arevalo  [facility] [place] [address]    Asked daughter Lillie for her fathers number. She gave is number of [phone], which is not listed in chart. Chart was updated with current number.    Jennifer) with International department then assisted me with contacting patient at the number given by his daughter. [phone]. Patient did not answer and message was left by Jennifer) informing patient to please call Endoscopy so we can get him scheduled at [phone].

## 2018-12-14 RX ORDER — OMEPRAZOLE 20 MG/1
CAPSULE, DELAYED RELEASE ORAL
Qty: 90 CAPSULE | Refills: 2 | Status: SHIPPED | OUTPATIENT
Start: 2018-12-14 | End: 2019-10-17 | Stop reason: SDUPTHER

## 2019-03-28 DIAGNOSIS — G50.0 TRIGEMINAL NEURALGIA: ICD-10-CM

## 2019-03-28 RX ORDER — GABAPENTIN 800 MG/1
800 TABLET ORAL 3 TIMES DAILY
Qty: 270 TABLET | Refills: 3 | Status: SHIPPED | OUTPATIENT
Start: 2019-03-28 | End: 2019-04-05 | Stop reason: SDUPTHER

## 2019-04-05 DIAGNOSIS — G50.0 TRIGEMINAL NEURALGIA: ICD-10-CM

## 2019-04-09 RX ORDER — GABAPENTIN 800 MG/1
800 TABLET ORAL 3 TIMES DAILY
Qty: 270 TABLET | Refills: 3 | Status: SHIPPED | OUTPATIENT
Start: 2019-04-09 | End: 2020-06-19

## 2019-04-09 RX ORDER — MECLIZINE HCL 12.5 MG 12.5 MG/1
12.5 TABLET ORAL 2 TIMES DAILY PRN
Qty: 60 TABLET | Refills: 6 | Status: SHIPPED | OUTPATIENT
Start: 2019-04-09 | End: 2020-04-27 | Stop reason: SDUPTHER

## 2019-07-19 ENCOUNTER — TELEPHONE (OUTPATIENT)
Dept: OPHTHALMOLOGY | Facility: CLINIC | Age: 84
End: 2019-07-19

## 2019-07-19 ENCOUNTER — OFFICE VISIT (OUTPATIENT)
Dept: OPTOMETRY | Facility: CLINIC | Age: 84
End: 2019-07-19
Payer: MEDICARE

## 2019-07-19 DIAGNOSIS — Z98.41 S/P CATARACT SURGERY, RIGHT: ICD-10-CM

## 2019-07-19 DIAGNOSIS — H02.834 DERMATOCHALASIS OF BOTH UPPER EYELIDS: Primary | ICD-10-CM

## 2019-07-19 DIAGNOSIS — H52.203 ASTIGMATISM OF BOTH EYES, UNSPECIFIED TYPE: ICD-10-CM

## 2019-07-19 DIAGNOSIS — H43.393 VITREOUS SYNERESIS OF BOTH EYES: ICD-10-CM

## 2019-07-19 DIAGNOSIS — H53.483 VISUAL FIELD CONSTRICTION, BILATERAL: ICD-10-CM

## 2019-07-19 DIAGNOSIS — Z96.1 PSEUDOPHAKIA: ICD-10-CM

## 2019-07-19 DIAGNOSIS — Z98.42 S/P CATARACT SURGERY, LEFT: ICD-10-CM

## 2019-07-19 DIAGNOSIS — H02.831 DERMATOCHALASIS OF BOTH UPPER EYELIDS: Primary | ICD-10-CM

## 2019-07-19 DIAGNOSIS — H43.393 VITREOUS FLOATERS OF BOTH EYES: ICD-10-CM

## 2019-07-19 PROCEDURE — 92014 PR EYE EXAM, EST PATIENT,COMPREHESV: ICD-10-PCS | Mod: S$PBB,,, | Performed by: OPTOMETRIST

## 2019-07-19 PROCEDURE — 99999 PR PBB SHADOW E&M-EST. PATIENT-LVL III: ICD-10-PCS | Mod: PBBFAC,,, | Performed by: OPTOMETRIST

## 2019-07-19 PROCEDURE — 99999 PR PBB SHADOW E&M-EST. PATIENT-LVL III: CPT | Mod: PBBFAC,,, | Performed by: OPTOMETRIST

## 2019-07-19 PROCEDURE — 92014 COMPRE OPH EXAM EST PT 1/>: CPT | Mod: S$PBB,,, | Performed by: OPTOMETRIST

## 2019-07-19 PROCEDURE — 99213 OFFICE O/P EST LOW 20 MIN: CPT | Mod: PBBFAC | Performed by: OPTOMETRIST

## 2019-07-19 NOTE — PATIENT INSTRUCTIONS
"S/P cataract surgery in both eyes. Posterior chamber intraocular lens in each eye.  Syneresis of vitreous in each eye, with floaters in each eye.    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  VA with last refraction placed into phoropter comparable to that achieved with refraction in each eye.  No change made to the last spectacle lens Rx issued.  Updated spectacle lens Rx issued.    Mr. Saunders reports perceived problems with "hooding" of upper eyelids bilaterally (secondary to dermatochalasis and brow ptosis).  He feels that his vision is better when he manually lifts the upper lid, and he expresses interest in surgery (blepharoplasty) to help remedy the problem.    Advised of need for consult with Dr. Holden.  Generate referral to Dr. Holden.    Recheck in one year - or prior if any problems noted in the interim.         "

## 2019-07-19 NOTE — PROGRESS NOTES
HPI     eye examination       Additional comments: General eye examination and refraction.  Complains of droopy eyelids, and feels he can see better when he manually   lifts the eyelids (bialterally)               Comments     87 yr old  male -  with  to translate             Approximate date of last eye examination:06/07/2018  Name of last eye doctor seen:Dr. Worley    Wears glasses? yes     If yes, wears full-time or part-time?  Full time    Present glasses are bifocal / S V Distance / S V Reading:  bifocals  Approximate age of present glasses:  2 yrs  Got new glasses following last exam, or subsequently?:  no   Any problem with VA with glasses?  Patient would like a new rx for   glasses   ; sometimes blurry vision       Wears CLs?:  no  Headaches? no  Eye pain/discomfort? No                                                                             Flashes?  No   Floaters?  Yes, temporal OU occasionally  Diplopia/Double vision?  no  Patient's Ocular History:         Any eye surgeries? S/p cataract sx OU         Any eye injury?  no         Any treatment for eye disease?  No            Family history of eye disease?  none  Significant patient medical history:         1. Diabetes?  no   2. HBP?  no              3. Other (describe):  GERD   ! OTC eyedrops currently using:  no   ! Prescription eye meds currently using:  no   ! Any history of allergy/adverse reaction to any eye meds used   previously?  no    ! Any history of allergy/adverse reaction to eyedrops used during prior   eye exam(s)? no    ! Any history of allergy/adverse reaction to Novacaine or similar meds?   Doesn't  know   ! Any history of allergy/adverse reaction to Epinephrine or similar meds?   Doesn't know    ! Patient okay with use of anesthetic eyedrops to check eye pressure? yes            ! Patient okay with use of eyedrops to dilate pupils today? yes    !  Allergies/Medications/Medical History/Family History reviewed  "today?    yes      PD =  64/60  Desired reading distance = 14"                                                                         Last edited by Nathan Worley, OD on 7/19/2019 11:45 AM. (History)            Assessment /Plan     For exam results, see Encounter Report.    1. Dermatochalasis of both upper eyelids  Ambulatory Referral to Ophthalmology   2. Visual field constriction, bilateral  Ambulatory Referral to Ophthalmology   3. Vitreous floaters of both eyes     4. Vitreous syneresis of both eyes     5. S/P cataract surgery, left     6. S/P cataract surgery, right     7. Pseudophakia - Both Eyes     8. Astigmatism of both eyes, unspecified type                    S/P cataract surgery in both eyes. Posterior chamber intraocular lens in each eye.  Syneresis of vitreous in each eye, with floaters in each eye.    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  VA with last refraction placed into phoropter comparable to that achieved with refraction in each eye.  No change made to the last spectacle lens Rx issued.  Updated spectacle lens Rx issued.    Mr. Saunders reports perceived problems with "hooding" of upper eyelids bilaterally (secondary to dermatochalasis and brow ptosis).  He feels that his vision is better when he manually lifts the upper lid, and he expresses interest in surgery (blepharoplasty) to help remedy the problem.    Advised of need for consult with Dr. Holden.  Generate referral to Dr. Holden.    Recheck in one year - or prior if any problems noted in the interim.             "

## 2019-08-12 ENCOUNTER — TELEPHONE (OUTPATIENT)
Dept: OPHTHALMOLOGY | Facility: CLINIC | Age: 84
End: 2019-08-12

## 2019-10-17 RX ORDER — OMEPRAZOLE 20 MG/1
CAPSULE, DELAYED RELEASE ORAL
Qty: 90 CAPSULE | Refills: 2 | Status: SHIPPED | OUTPATIENT
Start: 2019-10-17 | End: 2020-06-24

## 2020-04-27 RX ORDER — MECLIZINE HCL 12.5 MG 12.5 MG/1
12.5 TABLET ORAL 2 TIMES DAILY PRN
Qty: 60 TABLET | Refills: 6 | Status: SHIPPED | OUTPATIENT
Start: 2020-04-27 | End: 2021-07-15 | Stop reason: SDUPTHER

## 2021-04-07 ENCOUNTER — TELEPHONE (OUTPATIENT)
Dept: FAMILY MEDICINE | Facility: CLINIC | Age: 86
End: 2021-04-07

## 2021-04-12 ENCOUNTER — OFFICE VISIT (OUTPATIENT)
Dept: FAMILY MEDICINE | Facility: CLINIC | Age: 86
End: 2021-04-12
Payer: COMMERCIAL

## 2021-04-12 VITALS
SYSTOLIC BLOOD PRESSURE: 130 MMHG | HEIGHT: 66 IN | DIASTOLIC BLOOD PRESSURE: 64 MMHG | OXYGEN SATURATION: 97 % | TEMPERATURE: 99 F | HEART RATE: 97 BPM | BODY MASS INDEX: 29.58 KG/M2 | WEIGHT: 184.06 LBS

## 2021-04-12 DIAGNOSIS — K21.00 GASTROESOPHAGEAL REFLUX DISEASE WITH ESOPHAGITIS WITHOUT HEMORRHAGE: ICD-10-CM

## 2021-04-12 DIAGNOSIS — R73.9 HYPERGLYCEMIA: ICD-10-CM

## 2021-04-12 DIAGNOSIS — H91.93 BILATERAL HEARING LOSS, UNSPECIFIED HEARING LOSS TYPE: ICD-10-CM

## 2021-04-12 DIAGNOSIS — M54.40 CHRONIC LOW BACK PAIN WITH SCIATICA, SCIATICA LATERALITY UNSPECIFIED, UNSPECIFIED BACK PAIN LATERALITY: ICD-10-CM

## 2021-04-12 DIAGNOSIS — G89.29 CHRONIC LOW BACK PAIN WITH SCIATICA, SCIATICA LATERALITY UNSPECIFIED, UNSPECIFIED BACK PAIN LATERALITY: ICD-10-CM

## 2021-04-12 DIAGNOSIS — G50.0 TRIGEMINAL NEURALGIA: Primary | ICD-10-CM

## 2021-04-12 DIAGNOSIS — K90.49 MALABSORPTION DUE TO INTOLERANCE, NOT ELSEWHERE CLASSIFIED: ICD-10-CM

## 2021-04-12 DIAGNOSIS — H53.8 BLURRY VISION: ICD-10-CM

## 2021-04-12 DIAGNOSIS — I67.2 CEREBRAL ATHEROSCLEROSIS: ICD-10-CM

## 2021-04-12 DIAGNOSIS — Z00.00 ANNUAL PHYSICAL EXAM: ICD-10-CM

## 2021-04-12 DIAGNOSIS — F32.A DEPRESSION, UNSPECIFIED DEPRESSION TYPE: ICD-10-CM

## 2021-04-12 PROCEDURE — 99215 OFFICE O/P EST HI 40 MIN: CPT | Mod: PBBFAC,PO | Performed by: INTERNAL MEDICINE

## 2021-04-12 PROCEDURE — 99204 OFFICE O/P NEW MOD 45 MIN: CPT | Mod: S$GLB,,, | Performed by: INTERNAL MEDICINE

## 2021-04-12 PROCEDURE — 99204 PR OFFICE/OUTPT VISIT, NEW, LEVL IV, 45-59 MIN: ICD-10-PCS | Mod: S$GLB,,, | Performed by: INTERNAL MEDICINE

## 2021-04-12 PROCEDURE — 99999 PR PBB SHADOW E&M-EST. PATIENT-LVL V: ICD-10-PCS | Mod: PBBFAC,,, | Performed by: INTERNAL MEDICINE

## 2021-04-12 PROCEDURE — 99999 PR PBB SHADOW E&M-EST. PATIENT-LVL V: CPT | Mod: PBBFAC,,, | Performed by: INTERNAL MEDICINE

## 2021-04-12 RX ORDER — FLUTICASONE PROPIONATE 50 MCG
2 SPRAY, SUSPENSION (ML) NASAL DAILY
Qty: 16 G | Refills: 3 | Status: SHIPPED | OUTPATIENT
Start: 2021-04-12 | End: 2021-07-04

## 2021-04-12 RX ORDER — GABAPENTIN 800 MG/1
400 TABLET ORAL 2 TIMES DAILY
Qty: 180 TABLET | Refills: 3 | Status: SHIPPED | OUTPATIENT
Start: 2021-04-12 | End: 2022-02-24

## 2021-04-12 RX ORDER — MELOXICAM 7.5 MG/1
7.5 TABLET ORAL DAILY PRN
Qty: 30 TABLET | Refills: 0 | Status: SHIPPED | OUTPATIENT
Start: 2021-04-12 | End: 2021-05-06

## 2021-04-12 RX ORDER — OMEPRAZOLE 20 MG/1
20 CAPSULE, DELAYED RELEASE ORAL DAILY
Qty: 90 CAPSULE | Refills: 3 | Status: SHIPPED | OUTPATIENT
Start: 2021-04-12 | End: 2022-04-13

## 2021-04-12 RX ORDER — DULOXETIN HYDROCHLORIDE 30 MG/1
30 CAPSULE, DELAYED RELEASE ORAL DAILY
Qty: 30 CAPSULE | Refills: 11 | Status: SHIPPED | OUTPATIENT
Start: 2021-04-12 | End: 2021-04-27

## 2021-04-12 RX ORDER — ASPIRIN 81 MG
5 TABLET, DELAYED RELEASE (ENTERIC COATED) ORAL 2 TIMES DAILY
Qty: 10 ML | Refills: 3 | Status: SHIPPED | OUTPATIENT
Start: 2021-04-12 | End: 2021-04-27

## 2021-04-20 ENCOUNTER — LAB VISIT (OUTPATIENT)
Dept: LAB | Facility: HOSPITAL | Age: 86
End: 2021-04-20
Attending: INTERNAL MEDICINE
Payer: COMMERCIAL

## 2021-04-20 DIAGNOSIS — G89.29 CHRONIC LOW BACK PAIN WITH SCIATICA, SCIATICA LATERALITY UNSPECIFIED, UNSPECIFIED BACK PAIN LATERALITY: ICD-10-CM

## 2021-04-20 DIAGNOSIS — M54.40 CHRONIC LOW BACK PAIN WITH SCIATICA, SCIATICA LATERALITY UNSPECIFIED, UNSPECIFIED BACK PAIN LATERALITY: ICD-10-CM

## 2021-04-20 LAB
AMORPH CRY UR QL COMP ASSIST: ABNORMAL
BACTERIA #/AREA URNS AUTO: ABNORMAL /HPF
BILIRUB UR QL STRIP: NEGATIVE
CLARITY UR REFRACT.AUTO: ABNORMAL
COLOR UR AUTO: YELLOW
GLUCOSE UR QL STRIP: NEGATIVE
HGB UR QL STRIP: NEGATIVE
HYALINE CASTS UR QL AUTO: 0 /LPF
KETONES UR QL STRIP: NEGATIVE
LEUKOCYTE ESTERASE UR QL STRIP: NEGATIVE
MICROSCOPIC COMMENT: ABNORMAL
NITRITE UR QL STRIP: NEGATIVE
PH UR STRIP: 6 [PH] (ref 5–8)
PROT UR QL STRIP: ABNORMAL
RBC #/AREA URNS AUTO: 0 /HPF (ref 0–4)
SP GR UR STRIP: >=1.03 (ref 1–1.03)
URN SPEC COLLECT METH UR: ABNORMAL
UROBILINOGEN UR STRIP-ACNC: NEGATIVE EU/DL
WBC #/AREA URNS AUTO: 0 /HPF (ref 0–5)

## 2021-04-20 PROCEDURE — 81000 URINALYSIS NONAUTO W/SCOPE: CPT | Performed by: INTERNAL MEDICINE

## 2021-04-22 ENCOUNTER — PATIENT MESSAGE (OUTPATIENT)
Dept: FAMILY MEDICINE | Facility: CLINIC | Age: 86
End: 2021-04-22

## 2021-04-27 ENCOUNTER — OFFICE VISIT (OUTPATIENT)
Dept: FAMILY MEDICINE | Facility: CLINIC | Age: 86
End: 2021-04-27
Payer: COMMERCIAL

## 2021-04-27 VITALS
SYSTOLIC BLOOD PRESSURE: 130 MMHG | DIASTOLIC BLOOD PRESSURE: 66 MMHG | OXYGEN SATURATION: 95 % | HEART RATE: 92 BPM | HEIGHT: 66 IN | BODY MASS INDEX: 29.48 KG/M2 | WEIGHT: 183.44 LBS

## 2021-04-27 DIAGNOSIS — R42 VERTIGO: ICD-10-CM

## 2021-04-27 DIAGNOSIS — G50.0 TRIGEMINAL NEURALGIA: ICD-10-CM

## 2021-04-27 DIAGNOSIS — K21.00 GASTROESOPHAGEAL REFLUX DISEASE WITH ESOPHAGITIS WITHOUT HEMORRHAGE: ICD-10-CM

## 2021-04-27 DIAGNOSIS — E11.00 TYPE 2 DIABETES MELLITUS WITH HYPEROSMOLARITY WITHOUT COMA, WITHOUT LONG-TERM CURRENT USE OF INSULIN: Primary | ICD-10-CM

## 2021-04-27 DIAGNOSIS — E78.5 HYPERLIPIDEMIA, UNSPECIFIED HYPERLIPIDEMIA TYPE: ICD-10-CM

## 2021-04-27 DIAGNOSIS — F32.A DEPRESSION, UNSPECIFIED DEPRESSION TYPE: ICD-10-CM

## 2021-04-27 PROCEDURE — 99214 OFFICE O/P EST MOD 30 MIN: CPT | Mod: S$GLB,,, | Performed by: INTERNAL MEDICINE

## 2021-04-27 PROCEDURE — 99214 PR OFFICE/OUTPT VISIT, EST, LEVL IV, 30-39 MIN: ICD-10-PCS | Mod: S$GLB,,, | Performed by: INTERNAL MEDICINE

## 2021-04-27 PROCEDURE — 99999 PR PBB SHADOW E&M-EST. PATIENT-LVL V: ICD-10-PCS | Mod: PBBFAC,,, | Performed by: INTERNAL MEDICINE

## 2021-04-27 PROCEDURE — 99999 PR PBB SHADOW E&M-EST. PATIENT-LVL V: CPT | Mod: PBBFAC,,, | Performed by: INTERNAL MEDICINE

## 2021-04-27 RX ORDER — METFORMIN HYDROCHLORIDE 500 MG/1
500 TABLET, EXTENDED RELEASE ORAL
Qty: 90 TABLET | Refills: 3 | Status: SHIPPED | OUTPATIENT
Start: 2021-04-27 | End: 2022-04-16

## 2021-04-27 RX ORDER — CITALOPRAM 10 MG/1
10 TABLET ORAL DAILY
Qty: 30 TABLET | Refills: 11 | Status: SHIPPED | OUTPATIENT
Start: 2021-04-27 | End: 2022-02-11

## 2021-04-28 ENCOUNTER — TELEPHONE (OUTPATIENT)
Dept: ADMINISTRATIVE | Facility: HOSPITAL | Age: 86
End: 2021-04-28

## 2021-05-03 ENCOUNTER — PATIENT OUTREACH (OUTPATIENT)
Dept: ADMINISTRATIVE | Facility: OTHER | Age: 86
End: 2021-05-03

## 2021-05-03 ENCOUNTER — OFFICE VISIT (OUTPATIENT)
Dept: OTOLARYNGOLOGY | Facility: CLINIC | Age: 86
End: 2021-05-03
Payer: COMMERCIAL

## 2021-05-03 ENCOUNTER — CLINICAL SUPPORT (OUTPATIENT)
Dept: OTOLARYNGOLOGY | Facility: CLINIC | Age: 86
End: 2021-05-03
Payer: COMMERCIAL

## 2021-05-03 VITALS
WEIGHT: 182.19 LBS | SYSTOLIC BLOOD PRESSURE: 124 MMHG | HEIGHT: 66 IN | HEART RATE: 85 BPM | BODY MASS INDEX: 29.28 KG/M2 | DIASTOLIC BLOOD PRESSURE: 71 MMHG

## 2021-05-03 DIAGNOSIS — T16.2XXA FOREIGN BODY OF LEFT EAR, INITIAL ENCOUNTER: ICD-10-CM

## 2021-05-03 DIAGNOSIS — H90.3 SENSORINEURAL HEARING LOSS (SNHL) OF BOTH EARS: Primary | ICD-10-CM

## 2021-05-03 DIAGNOSIS — H61.23 BILATERAL IMPACTED CERUMEN: ICD-10-CM

## 2021-05-03 DIAGNOSIS — H92.13 OTORRHEA OF BOTH EARS: ICD-10-CM

## 2021-05-03 DIAGNOSIS — H90.3 SENSORINEURAL HEARING LOSS (SNHL) OF BOTH EARS: Chronic | ICD-10-CM

## 2021-05-03 DIAGNOSIS — H60.393 OTHER INFECTIVE ACUTE OTITIS EXTERNA OF BOTH EARS: ICD-10-CM

## 2021-05-03 DIAGNOSIS — H60.63 CHRONIC OTITIS EXTERNA OF BOTH EARS, UNSPECIFIED TYPE: Primary | Chronic | ICD-10-CM

## 2021-05-03 PROCEDURE — 92567 TYMPANOMETRY: CPT | Mod: S$GLB,,, | Performed by: AUDIOLOGIST-HEARING AID FITTER

## 2021-05-03 PROCEDURE — 92553 AUDIOMETRY AIR & BONE: CPT | Mod: S$GLB,,, | Performed by: AUDIOLOGIST-HEARING AID FITTER

## 2021-05-03 PROCEDURE — 69200 PR REMV EXT CANAL FOREIGN BODY: ICD-10-PCS | Mod: LT,S$GLB,, | Performed by: OTOLARYNGOLOGY

## 2021-05-03 PROCEDURE — 69210 PR REMOVAL IMPACTED CERUMEN REQUIRING INSTRUMENTATION, UNILATERAL: ICD-10-PCS | Mod: 59,S$GLB,, | Performed by: OTOLARYNGOLOGY

## 2021-05-03 PROCEDURE — 69210 REMOVE IMPACTED EAR WAX UNI: CPT | Mod: 59,S$GLB,, | Performed by: OTOLARYNGOLOGY

## 2021-05-03 PROCEDURE — 99204 PR OFFICE/OUTPT VISIT, NEW, LEVL IV, 45-59 MIN: ICD-10-PCS | Mod: 25,S$GLB,, | Performed by: OTOLARYNGOLOGY

## 2021-05-03 PROCEDURE — 92553 PR AUDIOMETRY, AIR & BONE: ICD-10-PCS | Mod: S$GLB,,, | Performed by: AUDIOLOGIST-HEARING AID FITTER

## 2021-05-03 PROCEDURE — 69200 CLEAR OUTER EAR CANAL: CPT | Mod: LT,S$GLB,, | Performed by: OTOLARYNGOLOGY

## 2021-05-03 PROCEDURE — 99204 OFFICE O/P NEW MOD 45 MIN: CPT | Mod: 25,S$GLB,, | Performed by: OTOLARYNGOLOGY

## 2021-05-03 PROCEDURE — 99999 PR PBB SHADOW E&M-EST. PATIENT-LVL IV: CPT | Mod: PBBFAC,,, | Performed by: OTOLARYNGOLOGY

## 2021-05-03 PROCEDURE — 92567 PR TYMPA2METRY: ICD-10-PCS | Mod: S$GLB,,, | Performed by: AUDIOLOGIST-HEARING AID FITTER

## 2021-05-03 PROCEDURE — 99999 PR PBB SHADOW E&M-EST. PATIENT-LVL IV: ICD-10-PCS | Mod: PBBFAC,,, | Performed by: OTOLARYNGOLOGY

## 2021-05-03 RX ORDER — CLOTRIMAZOLE 1 G/ML
SOLUTION TOPICAL
Qty: 10 ML | Refills: 2 | Status: SHIPPED | OUTPATIENT
Start: 2021-05-03 | End: 2021-05-25 | Stop reason: SDUPTHER

## 2021-05-03 RX ORDER — CIPROFLOXACIN AND DEXAMETHASONE 3; 1 MG/ML; MG/ML
4 SUSPENSION/ DROPS AURICULAR (OTIC) 2 TIMES DAILY
Qty: 7.5 ML | Refills: 0 | Status: SHIPPED | OUTPATIENT
Start: 2021-05-03 | End: 2021-05-25 | Stop reason: SDUPTHER

## 2021-05-04 ENCOUNTER — PATIENT MESSAGE (OUTPATIENT)
Dept: FAMILY MEDICINE | Facility: CLINIC | Age: 86
End: 2021-05-04

## 2021-05-17 ENCOUNTER — TELEPHONE (OUTPATIENT)
Dept: FAMILY MEDICINE | Facility: CLINIC | Age: 86
End: 2021-05-17

## 2021-05-19 ENCOUNTER — TELEPHONE (OUTPATIENT)
Dept: ADMINISTRATIVE | Facility: HOSPITAL | Age: 86
End: 2021-05-19

## 2021-05-19 ENCOUNTER — LAB VISIT (OUTPATIENT)
Dept: LAB | Facility: HOSPITAL | Age: 86
End: 2021-05-19
Attending: INTERNAL MEDICINE
Payer: COMMERCIAL

## 2021-05-19 DIAGNOSIS — E11.00 TYPE 2 DIABETES MELLITUS WITH HYPEROSMOLARITY WITHOUT COMA, WITHOUT LONG-TERM CURRENT USE OF INSULIN: ICD-10-CM

## 2021-05-19 LAB
ALBUMIN SERPL BCP-MCNC: 3.9 G/DL (ref 3.5–5.2)
ALP SERPL-CCNC: 87 U/L (ref 55–135)
ALT SERPL W/O P-5'-P-CCNC: 10 U/L (ref 10–44)
ANION GAP SERPL CALC-SCNC: 13 MMOL/L (ref 8–16)
AST SERPL-CCNC: 16 U/L (ref 10–40)
BILIRUB SERPL-MCNC: 0.6 MG/DL (ref 0.1–1)
BUN SERPL-MCNC: 15 MG/DL (ref 8–23)
CALCIUM SERPL-MCNC: 8.9 MG/DL (ref 8.7–10.5)
CHLORIDE SERPL-SCNC: 103 MMOL/L (ref 95–110)
CO2 SERPL-SCNC: 23 MMOL/L (ref 23–29)
CREAT SERPL-MCNC: 1.2 MG/DL (ref 0.5–1.4)
EST. GFR  (AFRICAN AMERICAN): >60 ML/MIN/1.73 M^2
EST. GFR  (NON AFRICAN AMERICAN): 54 ML/MIN/1.73 M^2
GLUCOSE SERPL-MCNC: 143 MG/DL (ref 70–110)
POTASSIUM SERPL-SCNC: 4.1 MMOL/L (ref 3.5–5.1)
PROT SERPL-MCNC: 7.8 G/DL (ref 6–8.4)
SODIUM SERPL-SCNC: 139 MMOL/L (ref 136–145)

## 2021-05-19 PROCEDURE — 80053 COMPREHEN METABOLIC PANEL: CPT | Performed by: INTERNAL MEDICINE

## 2021-05-19 PROCEDURE — 36415 COLL VENOUS BLD VENIPUNCTURE: CPT | Performed by: INTERNAL MEDICINE

## 2021-05-20 ENCOUNTER — PATIENT MESSAGE (OUTPATIENT)
Dept: FAMILY MEDICINE | Facility: CLINIC | Age: 86
End: 2021-05-20

## 2021-05-25 ENCOUNTER — OFFICE VISIT (OUTPATIENT)
Dept: FAMILY MEDICINE | Facility: CLINIC | Age: 86
End: 2021-05-25
Payer: COMMERCIAL

## 2021-05-25 VITALS
SYSTOLIC BLOOD PRESSURE: 130 MMHG | BODY MASS INDEX: 29.2 KG/M2 | DIASTOLIC BLOOD PRESSURE: 56 MMHG | TEMPERATURE: 98 F | WEIGHT: 181.69 LBS | HEIGHT: 66 IN | HEART RATE: 83 BPM | OXYGEN SATURATION: 93 %

## 2021-05-25 DIAGNOSIS — F32.1 CURRENT MODERATE EPISODE OF MAJOR DEPRESSIVE DISORDER, UNSPECIFIED WHETHER RECURRENT: ICD-10-CM

## 2021-05-25 DIAGNOSIS — K08.109 TEETH MISSING: ICD-10-CM

## 2021-05-25 DIAGNOSIS — E78.5 HYPERLIPIDEMIA, UNSPECIFIED HYPERLIPIDEMIA TYPE: ICD-10-CM

## 2021-05-25 DIAGNOSIS — H60.60 CHRONIC OTITIS EXTERNA, UNSPECIFIED LATERALITY, UNSPECIFIED TYPE: ICD-10-CM

## 2021-05-25 DIAGNOSIS — E11.65 TYPE 2 DIABETES MELLITUS WITH HYPERGLYCEMIA, WITHOUT LONG-TERM CURRENT USE OF INSULIN: Primary | ICD-10-CM

## 2021-05-25 DIAGNOSIS — K21.00 GASTROESOPHAGEAL REFLUX DISEASE WITH ESOPHAGITIS WITHOUT HEMORRHAGE: ICD-10-CM

## 2021-05-25 DIAGNOSIS — H53.8 BLURRY VISION: ICD-10-CM

## 2021-05-25 PROCEDURE — 99214 PR OFFICE/OUTPT VISIT, EST, LEVL IV, 30-39 MIN: ICD-10-PCS | Mod: S$GLB,,, | Performed by: INTERNAL MEDICINE

## 2021-05-25 PROCEDURE — 99999 PR PBB SHADOW E&M-EST. PATIENT-LVL V: CPT | Mod: PBBFAC,,, | Performed by: INTERNAL MEDICINE

## 2021-05-25 PROCEDURE — 99214 OFFICE O/P EST MOD 30 MIN: CPT | Mod: S$GLB,,, | Performed by: INTERNAL MEDICINE

## 2021-05-25 PROCEDURE — 99999 PR PBB SHADOW E&M-EST. PATIENT-LVL V: ICD-10-PCS | Mod: PBBFAC,,, | Performed by: INTERNAL MEDICINE

## 2021-05-25 RX ORDER — CIPROFLOXACIN AND DEXAMETHASONE 3; 1 MG/ML; MG/ML
4 SUSPENSION/ DROPS AURICULAR (OTIC) 2 TIMES DAILY
Qty: 7.5 ML | Refills: 0 | Status: SHIPPED | OUTPATIENT
Start: 2021-05-25 | End: 2021-06-04

## 2021-05-25 RX ORDER — CLOTRIMAZOLE 1 G/ML
SOLUTION TOPICAL
Qty: 10 ML | Refills: 2 | Status: SHIPPED | OUTPATIENT
Start: 2021-05-25 | End: 2022-02-11

## 2021-05-25 RX ORDER — PREDNISOLONE ACETATE 10 MG/ML
1 SUSPENSION/ DROPS OPHTHALMIC 4 TIMES DAILY
Qty: 1 BOTTLE | Refills: 0 | Status: SHIPPED | OUTPATIENT
Start: 2021-05-25 | End: 2022-02-11

## 2021-05-26 ENCOUNTER — TELEPHONE (OUTPATIENT)
Dept: FAMILY MEDICINE | Facility: CLINIC | Age: 86
End: 2021-05-26

## 2021-05-26 PROBLEM — E11.65 TYPE 2 DIABETES MELLITUS WITH HYPERGLYCEMIA, WITHOUT LONG-TERM CURRENT USE OF INSULIN: Status: ACTIVE | Noted: 2021-05-26

## 2021-05-26 PROBLEM — F32.9 MAJOR DEPRESSIVE DISORDER: Status: ACTIVE | Noted: 2021-05-26

## 2021-05-27 ENCOUNTER — TELEPHONE (OUTPATIENT)
Dept: FAMILY MEDICINE | Facility: CLINIC | Age: 86
End: 2021-05-27

## 2021-07-15 RX ORDER — MECLIZINE HCL 12.5 MG 12.5 MG/1
12.5 TABLET ORAL 2 TIMES DAILY PRN
Qty: 60 TABLET | Refills: 6 | Status: SHIPPED | OUTPATIENT
Start: 2021-07-15 | End: 2022-04-26

## 2021-08-20 ENCOUNTER — LAB VISIT (OUTPATIENT)
Dept: LAB | Facility: HOSPITAL | Age: 86
End: 2021-08-20
Attending: INTERNAL MEDICINE
Payer: COMMERCIAL

## 2021-08-20 ENCOUNTER — PATIENT MESSAGE (OUTPATIENT)
Dept: FAMILY MEDICINE | Facility: CLINIC | Age: 86
End: 2021-08-20

## 2021-08-20 DIAGNOSIS — E11.65 TYPE 2 DIABETES MELLITUS WITH HYPERGLYCEMIA, WITHOUT LONG-TERM CURRENT USE OF INSULIN: ICD-10-CM

## 2021-08-20 LAB
ALBUMIN/CREAT UR: 15 UG/MG (ref 0–30)
CREAT UR-MCNC: 193 MG/DL (ref 23–375)
MICROALBUMIN UR DL<=1MG/L-MCNC: 29 UG/ML

## 2021-08-20 PROCEDURE — 82570 ASSAY OF URINE CREATININE: CPT | Performed by: INTERNAL MEDICINE

## 2021-08-20 PROCEDURE — 82043 UR ALBUMIN QUANTITATIVE: CPT | Performed by: INTERNAL MEDICINE

## 2021-08-22 ENCOUNTER — PATIENT OUTREACH (OUTPATIENT)
Dept: ADMINISTRATIVE | Facility: OTHER | Age: 86
End: 2021-08-22

## 2021-08-23 ENCOUNTER — TELEPHONE (OUTPATIENT)
Dept: FAMILY MEDICINE | Facility: CLINIC | Age: 86
End: 2021-08-23

## 2021-08-24 ENCOUNTER — OFFICE VISIT (OUTPATIENT)
Dept: OPTOMETRY | Facility: CLINIC | Age: 86
End: 2021-08-24
Payer: COMMERCIAL

## 2021-08-24 ENCOUNTER — TELEPHONE (OUTPATIENT)
Dept: FAMILY MEDICINE | Facility: CLINIC | Age: 86
End: 2021-08-24

## 2021-08-24 DIAGNOSIS — H43.393 VITREOUS SYNERESIS OF BOTH EYES: Primary | ICD-10-CM

## 2021-08-24 DIAGNOSIS — Z98.42 S/P BILATERAL CATARACT EXTRACTION: ICD-10-CM

## 2021-08-24 DIAGNOSIS — Z96.1 PSEUDOPHAKIA OF BOTH EYES: ICD-10-CM

## 2021-08-24 DIAGNOSIS — Z98.41 S/P BILATERAL CATARACT EXTRACTION: ICD-10-CM

## 2021-08-24 DIAGNOSIS — Z13.5 SCREENING FOR EYE CONDITION: ICD-10-CM

## 2021-08-24 DIAGNOSIS — H43.393 VITREOUS FLOATERS OF BOTH EYES: ICD-10-CM

## 2021-08-24 DIAGNOSIS — H52.203 ASTIGMATISM OF BOTH EYES, UNSPECIFIED TYPE: ICD-10-CM

## 2021-08-24 PROCEDURE — 92015 PR REFRACTION: ICD-10-PCS | Mod: S$GLB,,, | Performed by: OPTOMETRIST

## 2021-08-24 PROCEDURE — 92015 DETERMINE REFRACTIVE STATE: CPT | Mod: S$GLB,,, | Performed by: OPTOMETRIST

## 2021-08-24 PROCEDURE — 99999 PR PBB SHADOW E&M-EST. PATIENT-LVL III: CPT | Mod: PBBFAC,,, | Performed by: OPTOMETRIST

## 2021-08-24 PROCEDURE — 99999 PR PBB SHADOW E&M-EST. PATIENT-LVL III: ICD-10-PCS | Mod: PBBFAC,,, | Performed by: OPTOMETRIST

## 2021-08-24 PROCEDURE — 92014 PR EYE EXAM, EST PATIENT,COMPREHESV: ICD-10-PCS | Mod: S$GLB,,, | Performed by: OPTOMETRIST

## 2021-08-24 PROCEDURE — 99213 OFFICE O/P EST LOW 20 MIN: CPT | Mod: PBBFAC | Performed by: OPTOMETRIST

## 2021-08-24 PROCEDURE — 92014 COMPRE OPH EXAM EST PT 1/>: CPT | Mod: S$GLB,,, | Performed by: OPTOMETRIST

## 2021-08-25 ENCOUNTER — TELEPHONE (OUTPATIENT)
Dept: FAMILY MEDICINE | Facility: CLINIC | Age: 86
End: 2021-08-25

## 2022-01-27 ENCOUNTER — HOSPITAL ENCOUNTER (INPATIENT)
Facility: HOSPITAL | Age: 87
LOS: 6 days | Discharge: HOME | DRG: 177 | End: 2022-02-02
Attending: EMERGENCY MEDICINE | Admitting: INTERNAL MEDICINE
Payer: COMMERCIAL

## 2022-01-27 DIAGNOSIS — U07.1 COVID-19: ICD-10-CM

## 2022-01-27 DIAGNOSIS — R07.9 CHEST PAIN: ICD-10-CM

## 2022-01-27 DIAGNOSIS — U07.1 COVID-19 VIRUS INFECTION: Primary | ICD-10-CM

## 2022-01-27 PROBLEM — J18.9 MULTIFOCAL PNEUMONIA: Status: ACTIVE | Noted: 2022-01-27

## 2022-01-27 PROBLEM — J96.00 ACUTE RESPIRATORY FAILURE DUE TO COVID-19: Status: ACTIVE | Noted: 2022-01-27

## 2022-01-27 LAB
ALBUMIN SERPL BCP-MCNC: 2.9 G/DL (ref 3.5–5.2)
ALP SERPL-CCNC: 74 U/L (ref 55–135)
ALT SERPL W/O P-5'-P-CCNC: 19 U/L (ref 10–44)
ANION GAP SERPL CALC-SCNC: 14 MMOL/L (ref 8–16)
AST SERPL-CCNC: 27 U/L (ref 10–40)
BASOPHILS # BLD AUTO: 0.02 K/UL (ref 0–0.2)
BASOPHILS NFR BLD: 0.3 % (ref 0–1.9)
BILIRUB SERPL-MCNC: 0.5 MG/DL (ref 0.1–1)
BUN SERPL-MCNC: 20 MG/DL (ref 8–23)
CALCIUM SERPL-MCNC: 9.9 MG/DL (ref 8.7–10.5)
CHLORIDE SERPL-SCNC: 107 MMOL/L (ref 95–110)
CK SERPL-CCNC: 54 U/L (ref 20–200)
CO2 SERPL-SCNC: 22 MMOL/L (ref 23–29)
CREAT SERPL-MCNC: 1 MG/DL (ref 0.5–1.4)
CRP SERPL-MCNC: 157.8 MG/L (ref 0–8.2)
CTP QC/QA: YES
DIFFERENTIAL METHOD: ABNORMAL
EOSINOPHIL # BLD AUTO: 0.1 K/UL (ref 0–0.5)
EOSINOPHIL NFR BLD: 1.6 % (ref 0–8)
ERYTHROCYTE [DISTWIDTH] IN BLOOD BY AUTOMATED COUNT: 14.2 % (ref 11.5–14.5)
EST. GFR  (AFRICAN AMERICAN): >60 ML/MIN/1.73 M^2
EST. GFR  (NON AFRICAN AMERICAN): >60 ML/MIN/1.73 M^2
GLUCOSE SERPL-MCNC: 125 MG/DL (ref 70–110)
HCT VFR BLD AUTO: 41.4 % (ref 40–54)
HGB BLD-MCNC: 13.6 G/DL (ref 14–18)
IMM GRANULOCYTES # BLD AUTO: 0.04 K/UL (ref 0–0.04)
IMM GRANULOCYTES NFR BLD AUTO: 0.6 % (ref 0–0.5)
LACTATE SERPL-SCNC: 1.8 MMOL/L (ref 0.5–2.2)
LDH SERPL L TO P-CCNC: 231 U/L (ref 110–260)
LYMPHOCYTES # BLD AUTO: 1.4 K/UL (ref 1–4.8)
LYMPHOCYTES NFR BLD: 20.3 % (ref 18–48)
MCH RBC QN AUTO: 27.5 PG (ref 27–31)
MCHC RBC AUTO-ENTMCNC: 32.9 G/DL (ref 32–36)
MCV RBC AUTO: 84 FL (ref 82–98)
MONOCYTES # BLD AUTO: 0.7 K/UL (ref 0.3–1)
MONOCYTES NFR BLD: 10.2 % (ref 4–15)
NEUTROPHILS # BLD AUTO: 4.5 K/UL (ref 1.8–7.7)
NEUTROPHILS NFR BLD: 67 % (ref 38–73)
NRBC BLD-RTO: 0 /100 WBC
PLATELET # BLD AUTO: 354 K/UL (ref 150–450)
PMV BLD AUTO: 9.6 FL (ref 9.2–12.9)
POTASSIUM SERPL-SCNC: 3.6 MMOL/L (ref 3.5–5.1)
PROT SERPL-MCNC: 8 G/DL (ref 6–8.4)
RBC # BLD AUTO: 4.94 M/UL (ref 4.6–6.2)
SARS-COV-2 RDRP RESP QL NAA+PROBE: POSITIVE
SODIUM SERPL-SCNC: 143 MMOL/L (ref 136–145)
WBC # BLD AUTO: 6.75 K/UL (ref 3.9–12.7)

## 2022-01-27 PROCEDURE — 80053 COMPREHEN METABOLIC PANEL: CPT | Performed by: EMERGENCY MEDICINE

## 2022-01-27 PROCEDURE — 93010 EKG 12-LEAD: ICD-10-PCS | Mod: ,,, | Performed by: INTERNAL MEDICINE

## 2022-01-27 PROCEDURE — 99285 EMERGENCY DEPT VISIT HI MDM: CPT | Mod: 25

## 2022-01-27 PROCEDURE — U0002 COVID-19 LAB TEST NON-CDC: HCPCS | Performed by: EMERGENCY MEDICINE

## 2022-01-27 PROCEDURE — 82728 ASSAY OF FERRITIN: CPT | Performed by: EMERGENCY MEDICINE

## 2022-01-27 PROCEDURE — 93010 ELECTROCARDIOGRAM REPORT: CPT | Mod: ,,, | Performed by: INTERNAL MEDICINE

## 2022-01-27 PROCEDURE — 63600175 PHARM REV CODE 636 W HCPCS

## 2022-01-27 PROCEDURE — 94761 N-INVAS EAR/PLS OXIMETRY MLT: CPT

## 2022-01-27 PROCEDURE — 86140 C-REACTIVE PROTEIN: CPT | Performed by: EMERGENCY MEDICINE

## 2022-01-27 PROCEDURE — 83880 ASSAY OF NATRIURETIC PEPTIDE: CPT | Performed by: EMERGENCY MEDICINE

## 2022-01-27 PROCEDURE — 82550 ASSAY OF CK (CPK): CPT | Performed by: EMERGENCY MEDICINE

## 2022-01-27 PROCEDURE — 84145 PROCALCITONIN (PCT): CPT

## 2022-01-27 PROCEDURE — 85025 COMPLETE CBC W/AUTO DIFF WBC: CPT | Performed by: EMERGENCY MEDICINE

## 2022-01-27 PROCEDURE — 96365 THER/PROPH/DIAG IV INF INIT: CPT

## 2022-01-27 PROCEDURE — 84484 ASSAY OF TROPONIN QUANT: CPT | Performed by: EMERGENCY MEDICINE

## 2022-01-27 PROCEDURE — 93005 ELECTROCARDIOGRAM TRACING: CPT

## 2022-01-27 PROCEDURE — 27000207 HC ISOLATION

## 2022-01-27 PROCEDURE — 63600175 PHARM REV CODE 636 W HCPCS: Performed by: EMERGENCY MEDICINE

## 2022-01-27 PROCEDURE — 27100171 HC OXYGEN HIGH FLOW UP TO 24 HOURS

## 2022-01-27 PROCEDURE — 12000002 HC ACUTE/MED SURGE SEMI-PRIVATE ROOM

## 2022-01-27 PROCEDURE — 85379 FIBRIN DEGRADATION QUANT: CPT

## 2022-01-27 PROCEDURE — 85652 RBC SED RATE AUTOMATED: CPT

## 2022-01-27 PROCEDURE — 83615 LACTATE (LD) (LDH) ENZYME: CPT | Performed by: EMERGENCY MEDICINE

## 2022-01-27 PROCEDURE — 83605 ASSAY OF LACTIC ACID: CPT | Performed by: EMERGENCY MEDICINE

## 2022-01-27 PROCEDURE — 87040 BLOOD CULTURE FOR BACTERIA: CPT | Performed by: EMERGENCY MEDICINE

## 2022-01-27 RX ORDER — ASCORBIC ACID 500 MG
500 TABLET ORAL 2 TIMES DAILY
Status: DISCONTINUED | OUTPATIENT
Start: 2022-01-28 | End: 2022-02-02 | Stop reason: HOSPADM

## 2022-01-27 RX ORDER — ACETAMINOPHEN 325 MG/1
650 TABLET ORAL EVERY 4 HOURS PRN
Status: DISCONTINUED | OUTPATIENT
Start: 2022-01-27 | End: 2022-02-02 | Stop reason: HOSPADM

## 2022-01-27 RX ORDER — TALC
6 POWDER (GRAM) TOPICAL NIGHTLY PRN
Status: DISCONTINUED | OUTPATIENT
Start: 2022-01-27 | End: 2022-02-02 | Stop reason: HOSPADM

## 2022-01-27 RX ORDER — DOXYCYCLINE HYCLATE 100 MG
100 TABLET ORAL 2 TIMES DAILY
Status: ON HOLD | COMMUNITY
Start: 2022-01-20 | End: 2022-02-02 | Stop reason: HOSPADM

## 2022-01-27 RX ORDER — DIAZEPAM 10 MG/2ML
5 INJECTION INTRAMUSCULAR
Status: COMPLETED | OUTPATIENT
Start: 2022-01-27 | End: 2022-01-27

## 2022-01-27 RX ORDER — BENZONATATE 100 MG/1
100 CAPSULE ORAL 3 TIMES DAILY PRN
Status: DISCONTINUED | OUTPATIENT
Start: 2022-01-27 | End: 2022-02-02 | Stop reason: HOSPADM

## 2022-01-27 RX ORDER — AZITHROMYCIN 250 MG/1
250 TABLET, FILM COATED ORAL DAILY
Status: DISCONTINUED | OUTPATIENT
Start: 2022-01-29 | End: 2022-01-28

## 2022-01-27 RX ORDER — ENOXAPARIN SODIUM 100 MG/ML
1 INJECTION SUBCUTANEOUS 2 TIMES DAILY
Status: DISCONTINUED | OUTPATIENT
Start: 2022-01-27 | End: 2022-02-02 | Stop reason: HOSPADM

## 2022-01-27 RX ORDER — LEVOFLOXACIN 5 MG/ML
750 INJECTION, SOLUTION INTRAVENOUS
Status: COMPLETED | OUTPATIENT
Start: 2022-01-27 | End: 2022-01-27

## 2022-01-27 RX ORDER — IBUPROFEN 200 MG
24 TABLET ORAL
Status: DISCONTINUED | OUTPATIENT
Start: 2022-01-27 | End: 2022-02-02 | Stop reason: HOSPADM

## 2022-01-27 RX ORDER — IBUPROFEN 200 MG
16 TABLET ORAL
Status: DISCONTINUED | OUTPATIENT
Start: 2022-01-27 | End: 2022-02-02 | Stop reason: HOSPADM

## 2022-01-27 RX ORDER — INSULIN ASPART 100 [IU]/ML
0-5 INJECTION, SOLUTION INTRAVENOUS; SUBCUTANEOUS
Status: DISCONTINUED | OUTPATIENT
Start: 2022-01-27 | End: 2022-02-02 | Stop reason: HOSPADM

## 2022-01-27 RX ORDER — LOPERAMIDE HYDROCHLORIDE 2 MG/1
2 CAPSULE ORAL EVERY 6 HOURS PRN
Status: DISCONTINUED | OUTPATIENT
Start: 2022-01-27 | End: 2022-02-02 | Stop reason: HOSPADM

## 2022-01-27 RX ORDER — ALBUTEROL SULFATE 90 UG/1
2 AEROSOL, METERED RESPIRATORY (INHALATION) EVERY 6 HOURS
Status: DISCONTINUED | OUTPATIENT
Start: 2022-01-28 | End: 2022-02-02 | Stop reason: HOSPADM

## 2022-01-27 RX ORDER — SIMETHICONE 80 MG
1 TABLET,CHEWABLE ORAL 4 TIMES DAILY PRN
Status: DISCONTINUED | OUTPATIENT
Start: 2022-01-27 | End: 2022-02-02 | Stop reason: HOSPADM

## 2022-01-27 RX ORDER — ONDANSETRON 2 MG/ML
4 INJECTION INTRAMUSCULAR; INTRAVENOUS EVERY 8 HOURS PRN
Status: DISCONTINUED | OUTPATIENT
Start: 2022-01-27 | End: 2022-02-02 | Stop reason: HOSPADM

## 2022-01-27 RX ORDER — MAG HYDROX/ALUMINUM HYD/SIMETH 200-200-20
30 SUSPENSION, ORAL (FINAL DOSE FORM) ORAL 4 TIMES DAILY PRN
Status: DISCONTINUED | OUTPATIENT
Start: 2022-01-27 | End: 2022-02-02 | Stop reason: HOSPADM

## 2022-01-27 RX ORDER — GLUCAGON 1 MG
1 KIT INJECTION
Status: DISCONTINUED | OUTPATIENT
Start: 2022-01-27 | End: 2022-02-02 | Stop reason: HOSPADM

## 2022-01-27 RX ADMIN — DIAZEPAM 5 MG: 5 INJECTION, SOLUTION INTRAMUSCULAR; INTRAVENOUS at 11:01

## 2022-01-27 RX ADMIN — ENOXAPARIN SODIUM 80 MG: 100 INJECTION SUBCUTANEOUS at 11:01

## 2022-01-27 RX ADMIN — DEXAMETHASONE 6 MG: 4 TABLET ORAL at 11:01

## 2022-01-27 RX ADMIN — LEVOFLOXACIN 750 MG: 750 INJECTION, SOLUTION INTRAVENOUS at 10:01

## 2022-01-27 NOTE — Clinical Note
Is this patient a high probability for COVID-19?: Yes  Diagnosis: COVID-19 virus infection [[id number]]  Future Attending Provider: GODWIN SIBLEY [[id number]]  Admitting Provider:: GODWIN SIBLEY [[id number]]

## 2022-01-28 LAB
ALBUMIN SERPL BCP-MCNC: 2.7 G/DL (ref 3.5–5.2)
ALP SERPL-CCNC: 74 U/L (ref 55–135)
ALT SERPL W/O P-5'-P-CCNC: 20 U/L (ref 10–44)
ANION GAP SERPL CALC-SCNC: 10 MMOL/L (ref 8–16)
APTT BLDCRRT: 39.1 SEC (ref 21–32)
AST SERPL-CCNC: 23 U/L (ref 10–40)
BACTERIA #/AREA URNS HPF: NORMAL /HPF
BASOPHILS # BLD AUTO: 0.01 K/UL (ref 0–0.2)
BASOPHILS NFR BLD: 0.2 % (ref 0–1.9)
BILIRUB SERPL-MCNC: 0.3 MG/DL (ref 0.1–1)
BILIRUB UR QL STRIP: NEGATIVE
BNP SERPL-MCNC: 39 PG/ML (ref 0–99)
BUN SERPL-MCNC: 19 MG/DL (ref 8–23)
CALCIUM SERPL-MCNC: 8.5 MG/DL (ref 8.7–10.5)
CHLORIDE SERPL-SCNC: 109 MMOL/L (ref 95–110)
CLARITY UR: CLEAR
CO2 SERPL-SCNC: 23 MMOL/L (ref 23–29)
COLOR UR: YELLOW
CREAT SERPL-MCNC: 0.8 MG/DL (ref 0.5–1.4)
CTP QC/QA: YES
D DIMER PPP IA.FEU-MCNC: 0.88 MG/L FEU
DIFFERENTIAL METHOD: ABNORMAL
EOSINOPHIL # BLD AUTO: 0 K/UL (ref 0–0.5)
EOSINOPHIL NFR BLD: 0.3 % (ref 0–8)
ERYTHROCYTE [DISTWIDTH] IN BLOOD BY AUTOMATED COUNT: 14 % (ref 11.5–14.5)
ERYTHROCYTE [SEDIMENTATION RATE] IN BLOOD BY WESTERGREN METHOD: 96 MM/HR (ref 0–10)
EST. GFR  (AFRICAN AMERICAN): >60 ML/MIN/1.73 M^2
EST. GFR  (NON AFRICAN AMERICAN): >60 ML/MIN/1.73 M^2
FERRITIN SERPL-MCNC: 490 NG/ML (ref 20–300)
GLUCOSE SERPL-MCNC: 132 MG/DL (ref 70–110)
GLUCOSE SERPL-MCNC: 139 MG/DL (ref 70–110)
GLUCOSE SERPL-MCNC: 157 MG/DL (ref 70–110)
GLUCOSE UR QL STRIP: NEGATIVE
HCT VFR BLD AUTO: 37.4 % (ref 40–54)
HGB BLD-MCNC: 12.3 G/DL (ref 14–18)
HGB UR QL STRIP: NEGATIVE
HYALINE CASTS #/AREA URNS LPF: 0 /LPF
IMM GRANULOCYTES # BLD AUTO: 0.04 K/UL (ref 0–0.04)
IMM GRANULOCYTES NFR BLD AUTO: 0.6 % (ref 0–0.5)
INR PPP: 1.2 (ref 0.8–1.2)
KETONES UR QL STRIP: ABNORMAL
LEUKOCYTE ESTERASE UR QL STRIP: NEGATIVE
LYMPHOCYTES # BLD AUTO: 0.6 K/UL (ref 1–4.8)
LYMPHOCYTES NFR BLD: 9.6 % (ref 18–48)
MAGNESIUM SERPL-MCNC: 1.8 MG/DL (ref 1.6–2.6)
MCH RBC QN AUTO: 27.4 PG (ref 27–31)
MCHC RBC AUTO-ENTMCNC: 32.9 G/DL (ref 32–36)
MCV RBC AUTO: 83 FL (ref 82–98)
MICROSCOPIC COMMENT: NORMAL
MONOCYTES # BLD AUTO: 0.2 K/UL (ref 0.3–1)
MONOCYTES NFR BLD: 3.8 % (ref 4–15)
NEUTROPHILS # BLD AUTO: 5.3 K/UL (ref 1.8–7.7)
NEUTROPHILS NFR BLD: 85.5 % (ref 38–73)
NITRITE UR QL STRIP: NEGATIVE
NRBC BLD-RTO: 0 /100 WBC
PH UR STRIP: 7 [PH] (ref 5–8)
PLATELET # BLD AUTO: 324 K/UL (ref 150–450)
PMV BLD AUTO: 9.6 FL (ref 9.2–12.9)
POC MOLECULAR INFLUENZA A AGN: NEGATIVE
POC MOLECULAR INFLUENZA B AGN: NEGATIVE
POCT GLUCOSE: 185 MG/DL (ref 70–110)
POTASSIUM SERPL-SCNC: 4.3 MMOL/L (ref 3.5–5.1)
PROCALCITONIN SERPL IA-MCNC: 0.03 NG/ML
PROT SERPL-MCNC: 6.3 G/DL (ref 6–8.4)
PROT UR QL STRIP: ABNORMAL
PROTHROMBIN TIME: 12.2 SEC (ref 9–12.5)
RBC # BLD AUTO: 4.49 M/UL (ref 4.6–6.2)
RBC #/AREA URNS HPF: 0 /HPF (ref 0–4)
SODIUM SERPL-SCNC: 142 MMOL/L (ref 136–145)
SP GR UR STRIP: >1.03 (ref 1–1.03)
TROPONIN I SERPL DL<=0.01 NG/ML-MCNC: 0.02 NG/ML (ref 0–0.03)
URN SPEC COLLECT METH UR: ABNORMAL
UROBILINOGEN UR STRIP-ACNC: NEGATIVE EU/DL
WBC # BLD AUTO: 6.24 K/UL (ref 3.9–12.7)
WBC #/AREA URNS HPF: 2 /HPF (ref 0–5)

## 2022-01-28 PROCEDURE — 83735 ASSAY OF MAGNESIUM: CPT

## 2022-01-28 PROCEDURE — 85025 COMPLETE CBC W/AUTO DIFF WBC: CPT

## 2022-01-28 PROCEDURE — 94761 N-INVAS EAR/PLS OXIMETRY MLT: CPT

## 2022-01-28 PROCEDURE — 97165 OT EVAL LOW COMPLEX 30 MIN: CPT

## 2022-01-28 PROCEDURE — 25000242 PHARM REV CODE 250 ALT 637 W/ HCPCS

## 2022-01-28 PROCEDURE — 97530 THERAPEUTIC ACTIVITIES: CPT

## 2022-01-28 PROCEDURE — 99900035 HC TECH TIME PER 15 MIN (STAT)

## 2022-01-28 PROCEDURE — 94640 AIRWAY INHALATION TREATMENT: CPT

## 2022-01-28 PROCEDURE — 11000001 HC ACUTE MED/SURG PRIVATE ROOM

## 2022-01-28 PROCEDURE — 81000 URINALYSIS NONAUTO W/SCOPE: CPT

## 2022-01-28 PROCEDURE — 63600175 PHARM REV CODE 636 W HCPCS

## 2022-01-28 PROCEDURE — 25000003 PHARM REV CODE 250

## 2022-01-28 PROCEDURE — 63600175 PHARM REV CODE 636 W HCPCS: Performed by: INTERNAL MEDICINE

## 2022-01-28 PROCEDURE — 97161 PT EVAL LOW COMPLEX 20 MIN: CPT

## 2022-01-28 PROCEDURE — 94799 UNLISTED PULMONARY SVC/PX: CPT

## 2022-01-28 PROCEDURE — 80053 COMPREHEN METABOLIC PANEL: CPT

## 2022-01-28 PROCEDURE — 27100098 HC SPACER

## 2022-01-28 PROCEDURE — 85730 THROMBOPLASTIN TIME PARTIAL: CPT

## 2022-01-28 PROCEDURE — 85610 PROTHROMBIN TIME: CPT

## 2022-01-28 PROCEDURE — 27100171 HC OXYGEN HIGH FLOW UP TO 24 HOURS

## 2022-01-28 PROCEDURE — 27000207 HC ISOLATION

## 2022-01-28 RX ORDER — PANTOPRAZOLE SODIUM 40 MG/1
40 TABLET, DELAYED RELEASE ORAL DAILY
Status: DISCONTINUED | OUTPATIENT
Start: 2022-01-28 | End: 2022-02-02 | Stop reason: HOSPADM

## 2022-01-28 RX ORDER — FUROSEMIDE 10 MG/ML
20 INJECTION INTRAMUSCULAR; INTRAVENOUS ONCE
Status: COMPLETED | OUTPATIENT
Start: 2022-01-28 | End: 2022-01-28

## 2022-01-28 RX ADMIN — FUROSEMIDE 20 MG: 10 INJECTION, SOLUTION INTRAMUSCULAR; INTRAVENOUS at 01:01

## 2022-01-28 RX ADMIN — ALBUTEROL SULFATE 2 PUFF: 90 AEROSOL, METERED RESPIRATORY (INHALATION) at 08:01

## 2022-01-28 RX ADMIN — OXYCODONE HYDROCHLORIDE AND ACETAMINOPHEN 500 MG: 500 TABLET ORAL at 09:01

## 2022-01-28 RX ADMIN — THERA TABS 1 TABLET: TAB at 09:01

## 2022-01-28 RX ADMIN — OXYCODONE HYDROCHLORIDE AND ACETAMINOPHEN 500 MG: 500 TABLET ORAL at 08:01

## 2022-01-28 RX ADMIN — REMDESIVIR 200 MG: 100 INJECTION, POWDER, LYOPHILIZED, FOR SOLUTION INTRAVENOUS at 01:01

## 2022-01-28 RX ADMIN — ALBUTEROL SULFATE 2 PUFF: 90 AEROSOL, METERED RESPIRATORY (INHALATION) at 12:01

## 2022-01-28 RX ADMIN — ALBUTEROL SULFATE 2 PUFF: 90 AEROSOL, METERED RESPIRATORY (INHALATION) at 07:01

## 2022-01-28 RX ADMIN — ENOXAPARIN SODIUM 80 MG: 100 INJECTION SUBCUTANEOUS at 08:01

## 2022-01-28 RX ADMIN — DEXAMETHASONE 6 MG: 4 TABLET ORAL at 09:01

## 2022-01-28 RX ADMIN — PANTOPRAZOLE SODIUM 40 MG: 40 TABLET, DELAYED RELEASE ORAL at 09:01

## 2022-01-28 RX ADMIN — ENOXAPARIN SODIUM 80 MG: 100 INJECTION SUBCUTANEOUS at 09:01

## 2022-01-28 NOTE — ASSESSMENT & PLAN NOTE
- COVID positive 1/27/22; initiated on protocol  - Isolation: Airborne/Droplet. Surgical mask on patient. Notify Infection Control  - CXR with low lung volumes with bilateral interstitial and patchy airspace opacities concerning for multifocal infection, including viral or bacterial pneumonia., requiring O2  - Monitor on Telemetry  - initiated on dexamethasone, will continue for 10d course  - initiated on remdesivir x5d; daily CMP  -Patient received Levofloxacin IV x1  - initiated on ceftriaxone/azith x5d due to concern for superimposed CAP  - .8; D-dimer pending; procalcitonin pending  - Order RT consult via Respiratory Communication for COVID Protocols  - Incentive Spirometer Q4h  - Continuous Pulse Oximetry, goal SpO2 92-96%  - supplemental O2 PRN, will wean as tolerated  - Albuterol INH Q6h scheduled & PRN   - MVI & ascorbic acid 500mg PO BID  -Anti-tussives for cough  - Acetaminophen Q6hr PRN fever/headache  - Loperamide PRN viral diarrhea  - VTE PPx: enoxaparin   - PT/OT for debility/weakness  - If deterioration, may warrant trial of NIPPV in neg pressure room or immediate ICU consult  Cont decadron/remdesivir  Consider adding baricitinib if o2 requirements don't improve  Low suspicion for superimposed bacterial infection, discontinue abx

## 2022-01-28 NOTE — H&P
"[facility] Medicine  History & Physical    Patient Name: Norman Saunders  MRN: [id number]  Patient Class: IP- Inpatient  Admission Date: 1/27/2022  Attending Physician: No att. providers found   Primary Care Provider: Darshana Brandon MD         Patient information was obtained from patient, relative(s) and ER records.     Subjective:     Principal Problem:Acute respiratory failure due to COVID-19    Chief Complaint:   Chief Complaint   Patient presents with    Weakness     Patient presents to the ED with family who reports patient has been weak,  having fever, body aches, and decreased appetite. States patient was seen at urgent care x 1 week ago, was covid negative, and prescribed doxycycline that is completed at this time. Family reports home oxygen saturation was reading "85%"    Fever    Cough        HPI: Norman Saunders is an 89 y/o female with PMHx DMII, GERD, Major Depression Disorder presents to [facility] ED with complaints of weakness, fever, body aches, and decreased appetite. Patient states he was recently seen at an urgent care for his symptoms one week ago and tested negative for COVID-19. He was discharged with doxycycline which he completed his course of therapy. Family reports patient's home oxygen saturation was 85% on room air. Patient denies chest pain, palpitations, or leg swelling. Patient denies N/V/D. In ED: Labs remarkable for CRP for 157.8. CXR demonstrated low lung volumes with bilateral interstitial and patchy airspace opacities concerning for multifocal infection, including viral or bacterial pneumonia. Blood and urine cultures pending. COVID-19 protocol initiated. Will admit to hospital medicine for further evaluation and treatment.         Past Medical History:   Diagnosis Date    Cataract     COVID-19 01/27/2022    GERD (gastroesophageal reflux disease)     Trigeminal neuralgia     Type 2 diabetes mellitus with hyperglycemia, without long-term current use of insulin " 5/26/2021       Past Surgical History:   Procedure Laterality Date    CATARACT EXTRACTION         Review of patient's allergies indicates:  No Known Allergies    No current facility-administered medications on file prior to encounter.     Current Outpatient Medications on File Prior to Encounter   Medication Sig    gabapentin (NEURONTIN) 800 MG tablet Take 0.5 tablets (400 mg total) by mouth 2 (two) times daily.    meclizine (ANTIVERT) 12.5 mg tablet Take 1 tablet (12.5 mg total) by mouth 2 (two) times daily as needed for Dizziness.    metFORMIN (GLUCOPHAGE-XR) 500 MG ER 24hr tablet Take 1 tablet (500 mg total) by mouth daily with breakfast.    omeprazole (PRILOSEC) 20 MG capsule Take 1 capsule (20 mg total) by mouth once daily. Para el reflujo.    fluticasone propionate (FLONASE) 50 mcg/actuation nasal spray 2 SPRAYS (100 MCG TOTAL) BY EACH NOSTRIL ROUTE ONCE DAILY.     Family History    None       Tobacco Use    Smoking status: Never Smoker    Smokeless tobacco: Never Used   Substance and Sexual Activity    Alcohol use: No    Drug use: No    Sexual activity: Not on file     Review of Systems   Constitutional:  Positive for fatigue. Negative for chills, diaphoresis and fever.   HENT:  Negative for hearing loss and sore throat.    Eyes:  Negative for visual disturbance.   Respiratory:  Positive for cough. Negative for shortness of breath.    Cardiovascular:  Negative for chest pain and leg swelling.   Gastrointestinal:  Negative for abdominal pain, diarrhea, nausea and vomiting.   Genitourinary:  Negative for difficulty urinating and flank pain.   Musculoskeletal:  Negative for back pain.   Skin:  Negative for rash and wound.   Neurological:  Negative for dizziness, weakness and headaches.   Psychiatric/Behavioral:  Negative for agitation and confusion.    Objective:     Vital Signs (Most Recent):  Temp: 97.7 °F (36.5 °C) (02/02/22 1056)  Pulse: 65 (02/02/22 1153)  Resp: 18 (02/02/22 1056)  BP: 126/62  (02/02/22 1056)  SpO2: (!) 93 % (02/02/22 1056)   Vital Signs (24h Range):        Weight: 71.6 kg (157 lb 13.6 oz)  Body mass index is 25.48 kg/m².    Physical Exam  Vitals and nursing note reviewed.   Constitutional:       General: He is not in acute distress.     Appearance: Normal appearance. He is not ill-appearing.   HENT:      Head: Normocephalic and atraumatic.      Mouth/Throat:      Mouth: Mucous membranes are moist.   Eyes:      Extraocular Movements: Extraocular movements intact.      Pupils: Pupils are equal, round, and reactive to light.   Cardiovascular:      Rate and Rhythm: Normal rate and regular rhythm.      Pulses: Normal pulses.      Heart sounds: Normal heart sounds. No murmur heard.    No friction rub. No gallop.   Pulmonary:      Effort: Pulmonary effort is normal. No respiratory distress.      Breath sounds: Examination of the right-lower field reveals decreased breath sounds. Examination of the left-lower field reveals decreased breath sounds. Decreased breath sounds present. No wheezing or rhonchi.   Abdominal:      General: Bowel sounds are normal. There is no distension.      Palpations: Abdomen is soft.      Tenderness: There is no abdominal tenderness. There is no guarding.   Musculoskeletal:         General: No swelling.      Cervical back: Normal range of motion and neck supple.      Right lower leg: No edema.      Left lower leg: No edema.   Skin:     General: Skin is warm and dry.      Capillary Refill: Capillary refill takes less than 2 seconds.      Findings: No bruising, erythema or rash.   Neurological:      General: No focal deficit present.      Mental Status: He is alert and oriented to person, place, and time.   Psychiatric:         Mood and Affect: Mood normal.         Behavior: Behavior normal. Behavior is cooperative.         CRANIAL NERVES     CN III, IV, VI   Pupils are equal, round, and reactive to light.     Significant Labs: All pertinent labs within the past 24  hours have been reviewed.        Significant Imaging: I have reviewed all pertinent imaging results/findings within the past 24 hours.    Assessment/Plan:     * Acute respiratory failure due to COVID-19  Patient with Hypoxic respiratory failure which is Acute which is not related to a recent procedure.  he is not on home oxygen. Supplemental oxygen was provided and noted- Nasal Cannula 4 LPM  Signs/symptoms of respiratory failure include- tachypnea, increased work of breathing, respiratory distress and use of accessory muscles. Contributing diagnoses includes - Pneumonia and covid-19 infection Labs and images were reviewed. Patient Has not has a recent ABG.   Will treat underlying causes and adjust management of respiratory failure as follows- Continue supplemental oxygen, albuterol INH for SOB/wheezing                COVID-19 Multifocal pneumonia  - COVID positive 1/27/22; initiated on protocol  - Isolation: Airborne/Droplet. Surgical mask on patient. Notify Infection Control  - CXR with low lung volumes with bilateral interstitial and patchy airspace opacities concerning for multifocal infection, including viral or bacterial pneumonia., requiring O2  - Monitor on Telemetry  -  continue on dexamethasone, will continue for 10d course  - continue on remdesivir x5d; daily CMP  -Patient received Levofloxacin IV x1  - initiated on ceftriaxone/azith x5d due to concern for superimposed CAP  - .8;   - Order RT consult via Respiratory Communication for COVID Protocols  - Incentive Spirometer Q4h  - Continuous Pulse Oximetry, goal SpO2 92-96%  - supplemental O2 PRN, will wean as tolerated  - Albuterol INH Q6h scheduled & PRN   - MVI & ascorbic acid 500mg PO BID  -Anti-tussives for cough  - Acetaminophen Q6hr PRN fever/headache  - Loperamide PRN viral diarrhea  - VTE PPx: enoxaparin   - PT/OT for debility/weakness  - If deterioration, may warrant trial of NIPPV in neg pressure room or immediate ICU consult  Low  suspicion for superimposed bacterial infection, discontinue abx  Cont decadron/remdesivir  Cont baricitinib  Wean off supp O2 as tolerated  Keep neg fluid balance  Discharge on home oxygen    Major depressive disorder  Stable  Reports does not take celexa anymore  Will continue to monitor      Type 2 diabetes mellitus with hyperglycemia, without long-term current use of insulin  Hemoglobin A1C   Date Value Ref Range Status   08/20/2021 6.6 (H) 4.0 - 5.6 % Final     Comment:     ADA Screening Guidelines:  5.7-6.4%  Consistent with prediabetes  >or=6.5%  Consistent with diabetes    High levels of fetal hemoglobin interfere with the HbA1C  assay. Heterozygous hemoglobin variants (HbS, HgC, etc)do  not significantly interfere with this assay.   However, presence of multiple variants may affect accuracy.                       Patient's FSGs are controlled on current medication regimen.  - home regimen includes metformin  - hold home oral medications  -CBG goal 140-180  - LDSSI with ACCUcheck ACHS  - Diabetic diet  -Hypoglycemia protocol PRN        GERD (gastroesophageal reflux disease)  Pantoprazole 40mg PO daily      VTE Risk Mitigation (From admission, onward)         Ordered     IP VTE HIGH RISK PATIENT  Once         01/27/22 4726                   Jory Jacobsen, MARIA L, ACNPC-AG, CCRN  Hospitalist  Department of Hospital Medicine  [facility]   [phone]

## 2022-01-28 NOTE — NURSING
VN aware of pt arrival. /tele/continuous pulse ox in place. Skin assessed and intact. Pt confused to time. Son at the bedside. Will continue to monitor.

## 2022-01-28 NOTE — PLAN OF CARE
OT toby performed, report to follow    Ongoing assessment of post acute needs.    Problem: Occupational Therapy Goal  Goal: Occupational Therapy Goal  Description: Goals to be met by: 2/28     Patient will increase functional independence with ADLs by performing:    UE Dressing with Supervision.  LE Dressing with Supervision.  Grooming while standing at sink with Supervision.  Toileting from toilet with Supervision for hygiene and clothing management.   Toilet transfer to toilet with Supervision.  Upper extremity exercise program x10 reps per handout, with supervision.    Outcome: Ongoing, Progressing

## 2022-01-28 NOTE — ASSESSMENT & PLAN NOTE
Hemoglobin A1C   Date Value Ref Range Status   08/20/2021 6.6 (H) 4.0 - 5.6 % Final     Comment:     ADA Screening Guidelines:  5.7-6.4%  Consistent with prediabetes  >or=6.5%  Consistent with diabetes    High levels of fetal hemoglobin interfere with the HbA1C  assay. Heterozygous hemoglobin variants (HbS, HgC, etc)do  not significantly interfere with this assay.   However, presence of multiple variants may affect accuracy.                       Patient's FSGs are controlled on current medication regimen.  - home regimen includes metformin  - hold home oral medications  -CBG goal 140-180  - LDSSI with ACCUcheck ACHS  - Diabetic diet  -Hypoglycemia protocol PRN

## 2022-01-28 NOTE — PLAN OF CARE
Problem: Physical Therapy Goal  Goal: Physical Therapy Goal  Description: Goals to be met by: 22     Patient will increase functional independence with mobility by performin. Supine <> sit with Stand-by Assistance  2. Sit to stand transfer with Stand-by Assistance  3. Bed to chair transfer with Stand-by Assistance using Rolling Walker  4. Gait  x 30 feet with Stand-by Assistance using Rolling Walker.   5. Lower extremity exercise program x 10 reps per handout, with supervision    Outcome: Ongoing, Progressing     PT evaluation completed, note to follow. Pt is [place] speaking, called  but either pt hard of hearing or confused therefore difficulty getting all information or with all command following this date. Recommendations ongoing pending pt's progress.

## 2022-01-28 NOTE — SUBJECTIVE & OBJECTIVE
Interval History: Currently on 13L HF. Resting comfortably w/out resp distress. Pt has no acute concerns at this time.     Review of Systems   Constitutional: Positive for fatigue. Negative for activity change, appetite change, chills, diaphoresis, fever and unexpected weight change.   Respiratory: Positive for cough and shortness of breath. Negative for chest tightness, wheezing and stridor.    Cardiovascular: Negative for chest pain, palpitations and leg swelling.   Gastrointestinal: Negative for abdominal pain, blood in stool, constipation, diarrhea, nausea and vomiting.   Endocrine: Negative for cold intolerance and heat intolerance.   Genitourinary: Negative for difficulty urinating, dysuria, flank pain and frequency.   Musculoskeletal: Negative for arthralgias, joint swelling and myalgias.   Skin: Negative.    Neurological: Negative for dizziness, weakness, light-headedness and headaches.     Objective:     Vital Signs (Most Recent):  Temp: 98.2 °F (36.8 °C) (01/28/22 1002)  Pulse: 91 (01/28/22 1002)  Resp: (!) 31 (01/28/22 1002)  BP: (!) 154/68 (01/28/22 1002)  SpO2: (!) 94 % (01/28/22 1002) Vital Signs (24h Range):  Temp:  [98.2 °F (36.8 °C)-98.8 °F (37.1 °C)] 98.2 °F (36.8 °C)  Pulse:  [64-91] 91  Resp:  [18-31] 31  SpO2:  [88 %-96 %] 94 %  BP: (131-195)/(61-74) 154/68     Weight: 82.1 kg (181 lb)  Body mass index is 29.21 kg/m².    Intake/Output Summary (Last 24 hours) at 1/28/2022 1152  Last data filed at 1/28/2022 0450  Gross per 24 hour   Intake 370.75 ml   Output 185 ml   Net 185.75 ml      Physical Exam  Constitutional:       General: He is not in acute distress.     Appearance: He is ill-appearing.   HENT:      Head: Normocephalic and atraumatic.      Mouth/Throat:      Mouth: Oropharynx is clear and moist.   Eyes:      Extraocular Movements: EOM normal.      Conjunctiva/sclera: Conjunctivae normal.      Pupils: Pupils are equal, round, and reactive to light.   Cardiovascular:      Rate and Rhythm:  Normal rate and regular rhythm.      Pulses: Intact distal pulses.      Heart sounds: Normal heart sounds.   Pulmonary:      Effort: Pulmonary effort is normal. No respiratory distress.      Breath sounds: Rales present.   Abdominal:      General: Bowel sounds are normal.      Palpations: Abdomen is soft.   Musculoskeletal:         General: Normal range of motion.      Cervical back: Normal range of motion and neck supple.   Skin:     General: Skin is warm and dry.   Neurological:      General: No focal deficit present.      Mental Status: He is alert and oriented to person, place, and time.   Psychiatric:         Mood and Affect: Mood and affect normal.         Significant Labs: All pertinent labs within the past 24 hours have been reviewed.    Significant Imaging: I have reviewed all pertinent imaging results/findings within the past 24 hours.

## 2022-01-28 NOTE — ASSESSMENT & PLAN NOTE
- COVID positive 1/27/22; initiated on protocol  - Isolation: Airborne/Droplet. Surgical mask on patient. Notify Infection Control  - CXR with low lung volumes with bilateral interstitial and patchy airspace opacities concerning for multifocal infection, including viral or bacterial pneumonia., requiring O2  - Monitor on Telemetry  - initiated on dexamethasone, will continue for 10d course  - initiated on remdesivir x5d; daily CMP  -Patient received Levofloxacin IV x1  - initiated on ceftriaxone/azith x5d due to concern for superimposed CAP  - .8; D-dimer pending; procalcitonin pending  - Order RT consult via Respiratory Communication for COVID Protocols  - Incentive Spirometer Q4h  - Continuous Pulse Oximetry, goal SpO2 92-96%  - supplemental O2 PRN, will wean as tolerated  - Albuterol INH Q6h scheduled & PRN   - MVI & ascorbic acid 500mg PO BID  -Anti-tussives for cough  - Acetaminophen Q6hr PRN fever/headache  - Loperamide PRN viral diarrhea  - VTE PPx: enoxaparin   - PT/OT for debility/weakness  - If deterioration, may warrant trial of NIPPV in neg pressure room or immediate ICU consult

## 2022-01-28 NOTE — HPI
Norman Saunders is an 89 y/o female with PMHx DMII, GERD, Major Depression Disorder presents to [facility] ED with complaints of weakness, fever, body aches, and decreased appetite. Patient states he was recently seen at an urgent care for his symptoms one week ago and tested negative for COVID-19. He was discharged with doxycycline which he completed his course of therapy. Family reports patient's home oxygen saturation was 85% on room air. Patient denies chest pain, palpitations, or leg swelling. Patient denies N/V/D. In ED: Labs remarkable for CRP for 157.8. CXR demonstrated low lung volumes with bilateral interstitial and patchy airspace opacities concerning for multifocal infection, including viral or bacterial pneumonia. Blood and urine cultures pending. COVID-19 protocol initiated. Will admit to hospital medicine for further evaluation and treatment.

## 2022-01-28 NOTE — PT/OT/SLP EVAL
Physical Therapy Evaluation    Patient Name:  Norman Saunders   MRN:  [id number]    Recommendations:     Discharge Recommendations:   (TBD)   Discharge Equipment Recommendations:  (TBD)   Barriers to Discharge: limited activiyt tolerance at this time    Assessment:     Norman Saunders is a 89 y.o. male admitted with a medical diagnosis of Acute respiratory failure due to COVID-19.  He presents with the following impairments/functional limitations:  weakness,impaired endurance,impaired self care skills,impaired functional mobilty,gait instability,impaired balance,impaired cognition,decreased coordination,decreased upper extremity function,decreased lower extremity function,decreased safety awareness,decreased ROM,impaired coordination,impaired cardiopulmonary response to activity. Pt is Singaporean speaking, called  but either pt hard of hearing or confused therefore difficulty getting all information or with all command following this date. Recommendations ongoing pending pt's progress.     Rehab Prognosis: Good; patient would benefit from acute skilled PT services to address these deficits and reach maximum level of function.    Recent Surgery: * No surgery found *      Plan:     During this hospitalization, patient to be seen 5 x/week to address the identified rehab impairments via gait training,therapeutic activities,therapeutic exercises,neuromuscular re-education and progress toward the following goals:    · Plan of Care Expires:  02/28/22    Subjective     Chief Complaint: none  Patient/Family Comments/goals: pt is Singaporean speaking, utilized  over the phone. Tara ID: [id number]. Difficulty with hearing or understanding commands, unsure.  Pain/Comfort:  · Pain Rating 1: 0/10    Patients cultural, spiritual, Uatsdin conflicts given the current situation: no    Living Environment:  Per pt's son: pt lives with his son in a [facility] with 4 MAURI with B rails and tub/shower combo.  Prior to admission,  "patients level of function was supervision/mod I with use of his SPC or RW or mobility and ADLs. Pt does not drive, his son drives him.  Equipment used at home: cane, straight,walker, rolling.  DME owned (not currently used): unknown.  Upon discharge, patient will have assistance from his son is home with him at all times.    Objective:     Communicated with nurse prior to session.  Patient found HOB elevated with blood pressure cuff,pulse ox (continuous),telemetry,oxygen  upon PT entry to room.    General Precautions: Standard, fall,airborne,contact,droplet   Orthopedic Precautions:N/A   Braces: N/A  Respiratory Status: High flow, flow 12 L/min    Exams:  · Cognitive Exam:  Patient is oriented to name, reports location as "not at home"  · Gross Motor Coordination:  posterior lean in standing  · Postural Exam:  Patient presented with the following abnormalities:    · -       Rounded shoulders  · -       Forward head  · Sensation:    · -       Intact  · Skin Integrity/Edema:      · -       Skin integrity: Visible skin intact  · BLE ROM: WFL  · BLE Strength: does not follow commands to maintain to assess    Functional Mobility:  · Bed Mobility:     · Scooting: contact guard assistance  · Supine to Sit: contact guard assistance  · Sit to Supine: contact guard assistance  · Transfers:     · Sit to Stand:  minimum assistance with hand-held assist  · Gait: 3-4 side steps left with B HHA and max A - posterior leaning, antalgic over LLE with L lateral leaning.    Therapeutic Activities and Exercises:  Educated pt on role of PT.  Transitioned to sit EOB, SaO2 remained in 90s while sitting EOB.  Pt with rounded shoulders, flexed with BUEs bracing on bed. Pt denies dizziness, lightheadedness but reporting "a little" shortness of breath. BP with no decrease.  Completed stand and side steps as reported above then returned to sit EOB. SaO2 decreased to 85-87% and increased to 90s within ~1 min rest.  Returned to bed.    AM-PAC 6 " CLICK MOBILITY  Total Score:13     Patient left HOB elevated with all lines intact, call button in reach, nurse notified and pt's son present.    GOALS:   Multidisciplinary Problems     Physical Therapy Goals        Problem: Physical Therapy Goal    Goal Priority Disciplines Outcome Goal Variances Interventions   Physical Therapy Goal     PT, PT/OT Ongoing, Progressing     Description: Goals to be met by: 22     Patient will increase functional independence with mobility by performin. Supine <> sit with Stand-by Assistance  2. Sit to stand transfer with Stand-by Assistance  3. Bed to chair transfer with Stand-by Assistance using Rolling Walker  4. Gait  x 30 feet with Stand-by Assistance using Rolling Walker.   5. Lower extremity exercise program x 10 reps per handout, with supervision                     History:     Past Medical History:   Diagnosis Date    Cataract     COVID-19 2022    GERD (gastroesophageal reflux disease)     Trigeminal neuralgia     Type 2 diabetes mellitus with hyperglycemia, without long-term current use of insulin 2021       Past Surgical History:   Procedure Laterality Date    CATARACT EXTRACTION         Time Tracking:     PT Received On: 22  PT Start Time: 0943     PT Stop Time: 1010  PT Total Time (min): 27 min with OT    Billable Minutes: Evaluation 8 and Therapeutic Activity 10      2022

## 2022-01-28 NOTE — ASSESSMENT & PLAN NOTE
Patient with Hypoxic respiratory failure which is Acute which is not related to a recent procedure.  he is not on home oxygen. Supplemental oxygen was provided and noted- Nasal Cannula 12 LPM  Signs/symptoms of respiratory failure include- tachypnea, increased work of breathing, respiratory distress and use of accessory muscles. Contributing diagnoses includes - Pneumonia and covid-19 infection Labs and images were reviewed. Patient Has not has a recent ABG.   Will treat underlying causes and adjust management of respiratory failure as follows- Continue supplemental oxygen, albuterol INH for SOB/wheezing

## 2022-01-28 NOTE — PROGRESS NOTES
[facility] Emergency Dept  [facility] Medicine  Progress Note    Patient Name: Norman Saunders  MRN: [id number]  Patient Class: IP- Inpatient   Admission Date: 1/27/2022  Length of Stay: 1 days  Attending Physician: Maksim Valentine MD  Primary Care Provider: Darshana Brandon MD        Subjective:     Principal Problem:Acute respiratory failure due to COVID-19        HPI:  Norman Saunders is an 88 y/o female with PMHx DMII, GERD, Major Depression Disorder presents to [facility] ED with complaints of weakness, fever, body aches, and decreased appetite. Patient states he was recently seen at an urgent care for his symptoms one week ago and tested negative for COVID-19. He was discharged with doxycycline which he completed his course of therapy. Family reports patient's home oxygen saturation was 85% on room air. Patient denies chest pain, palpitations, or leg swelling. Patient denies N/V/D. In ED: Labs remarkable for CRP for 157.8. CXR demonstrated low lung volumes with bilateral interstitial and patchy airspace opacities concerning for multifocal infection, including viral or bacterial pneumonia. Blood and urine cultures pending. COVID-19 protocol initiated. Will admit to hospital medicine for further evaluation and treatment.         Overview/Hospital Course:  No notes on file    Interval History: Currently on 13L HF. Resting comfortably w/out resp distress. Pt has no acute concerns at this time.     Review of Systems   Constitutional: Positive for fatigue. Negative for activity change, appetite change, chills, diaphoresis, fever and unexpected weight change.   Respiratory: Positive for cough and shortness of breath. Negative for chest tightness, wheezing and stridor.    Cardiovascular: Negative for chest pain, palpitations and leg swelling.   Gastrointestinal: Negative for abdominal pain, blood in stool, constipation, diarrhea, nausea and vomiting.   Endocrine: Negative for cold intolerance and heat intolerance.   Genitourinary:  Negative for difficulty urinating, dysuria, flank pain and frequency.   Musculoskeletal: Negative for arthralgias, joint swelling and myalgias.   Skin: Negative.    Neurological: Negative for dizziness, weakness, light-headedness and headaches.     Objective:     Vital Signs (Most Recent):  Temp: 98.2 °F (36.8 °C) (01/28/22 1002)  Pulse: 91 (01/28/22 1002)  Resp: (!) 31 (01/28/22 1002)  BP: (!) 154/68 (01/28/22 1002)  SpO2: (!) 94 % (01/28/22 1002) Vital Signs (24h Range):  Temp:  [98.2 °F (36.8 °C)-98.8 °F (37.1 °C)] 98.2 °F (36.8 °C)  Pulse:  [64-91] 91  Resp:  [18-31] 31  SpO2:  [88 %-96 %] 94 %  BP: (131-195)/(61-74) 154/68     Weight: 82.1 kg (181 lb)  Body mass index is 29.21 kg/m².    Intake/Output Summary (Last 24 hours) at 1/28/2022 1152  Last data filed at 1/28/2022 0450  Gross per 24 hour   Intake 370.75 ml   Output 185 ml   Net 185.75 ml      Physical Exam  Constitutional:       General: He is not in acute distress.     Appearance: He is ill-appearing.   HENT:      Head: Normocephalic and atraumatic.      Mouth/Throat:      Mouth: Oropharynx is clear and moist.   Eyes:      Extraocular Movements: EOM normal.      Conjunctiva/sclera: Conjunctivae normal.      Pupils: Pupils are equal, round, and reactive to light.   Cardiovascular:      Rate and Rhythm: Normal rate and regular rhythm.      Pulses: Intact distal pulses.      Heart sounds: Normal heart sounds.   Pulmonary:      Effort: Pulmonary effort is normal. No respiratory distress.      Breath sounds: Rales present.   Abdominal:      General: Bowel sounds are normal.      Palpations: Abdomen is soft.   Musculoskeletal:         General: Normal range of motion.      Cervical back: Normal range of motion and neck supple.   Skin:     General: Skin is warm and dry.   Neurological:      General: No focal deficit present.      Mental Status: He is alert and oriented to person, place, and time.   Psychiatric:         Mood and Affect: Mood and affect normal.          Significant Labs: All pertinent labs within the past 24 hours have been reviewed.    Significant Imaging: I have reviewed all pertinent imaging results/findings within the past 24 hours.      Assessment/Plan:      * Acute respiratory failure due to COVID-19  Patient with Hypoxic respiratory failure which is Acute which is not related to a recent procedure.  he is not on home oxygen. Supplemental oxygen was provided and noted- Nasal Cannula 12 LPM  Signs/symptoms of respiratory failure include- tachypnea, increased work of breathing, respiratory distress and use of accessory muscles. Contributing diagnoses includes - Pneumonia and covid-19 infection Labs and images were reviewed. Patient Has not has a recent ABG.   Will treat underlying causes and adjust management of respiratory failure as follows- Continue supplemental oxygen, albuterol INH for SOB/wheezing                COVID-19 Multifocal pneumonia  - COVID positive 1/27/22; initiated on protocol  - Isolation: Airborne/Droplet. Surgical mask on patient. Notify Infection Control  - CXR with low lung volumes with bilateral interstitial and patchy airspace opacities concerning for multifocal infection, including viral or bacterial pneumonia., requiring O2  - Monitor on Telemetry  - initiated on dexamethasone, will continue for 10d course  - initiated on remdesivir x5d; daily CMP  -Patient received Levofloxacin IV x1  - initiated on ceftriaxone/azith x5d due to concern for superimposed CAP  - .8; D-dimer pending; procalcitonin pending  - Order RT consult via Respiratory Communication for COVID Protocols  - Incentive Spirometer Q4h  - Continuous Pulse Oximetry, goal SpO2 92-96%  - supplemental O2 PRN, will wean as tolerated  - Albuterol INH Q6h scheduled & PRN   - MVI & ascorbic acid 500mg PO BID  -Anti-tussives for cough  - Acetaminophen Q6hr PRN fever/headache  - Loperamide PRN viral diarrhea  - VTE PPx: enoxaparin   - PT/OT for debility/weakness  -  If deterioration, may warrant trial of NIPPV in neg pressure room or immediate ICU consult  Cont decadron/remdesivir  Consider adding baricitinib if o2 requirements don't improve  Low suspicion for superimposed bacterial infection, discontinue abx      Major depressive disorder  Stable  Reports does not take celexa anymore  Will continue to monitor      Type 2 diabetes mellitus with hyperglycemia, without long-term current use of insulin  Hemoglobin A1C   Date Value Ref Range Status   08/20/2021 6.6 (H) 4.0 - 5.6 % Final     Comment:     ADA Screening Guidelines:  5.7-6.4%  Consistent with prediabetes  >or=6.5%  Consistent with diabetes    High levels of fetal hemoglobin interfere with the HbA1C  assay. Heterozygous hemoglobin variants (HbS, HgC, etc)do  not significantly interfere with this assay.   However, presence of multiple variants may affect accuracy.                       Patient's FSGs are controlled on current medication regimen.  - home regimen includes metformin  - hold home oral medications  -CBG goal 140-180  - LDSSI with ACCUcheck ACHS  - Diabetic diet  -Hypoglycemia protocol PRN        GERD (gastroesophageal reflux disease)  Pantoprazole 40mg PO daily        VTE Risk Mitigation (From admission, onward)         Ordered     enoxaparin injection 80 mg  2 times daily         01/27/22 2254     IP VTE HIGH RISK PATIENT  Once         01/27/22 2254     Place sequential compression device  Until discontinued         01/27/22 2254                Discharge Planning   STEFANIE:      Code Status: Prior   Is the patient medically ready for discharge?:     Reason for patient still in hospital (select all that apply): Patient trending condition, Laboratory test and Treatment                     Maksim Valentine MD  Department of Hospital Medicine   [facility] - Emergency Dept

## 2022-01-28 NOTE — PT/OT/SLP EVAL
Occupational Therapy   Evaluation    Name: Norman Saunders  MRN: [id number]  Admitting Diagnosis:  Acute respiratory failure due to COVID-19  Recent Surgery: * No surgery found *      Recommendations:     Discharge Recommendations: other (see comments) (TBD)  Discharge Equipment Recommendations:  other (see comments) (TBD)  Barriers to discharge:  None    Assessment:     Norman Saunders is a 89 y.o. male with a medical diagnosis of Acute respiratory failure due to COVID-19.  He presents with performance deficits affecting function: weakness,impaired functional mobilty,impaired cardiopulmonary response to activity,impaired endurance,gait instability,impaired self care skills,decreased lower extremity function,decreased upper extremity function,impaired balance.      Rehab Prognosis: Good; patient would benefit from acute skilled OT services to address these deficits and reach maximum level of function.       Plan:     Patient to be seen 3 x/week to address the above listed problems via self-care/home management,therapeutic exercises,therapeutic activities  · Plan of Care Expires: 02/28/22  · Plan of Care Reviewed with: patient,son    Subjective     Chief Complaint: SOB  Patient/Family Comments/goals: return to PLOF    Occupational Profile:  Living Environment: Lives w/son in [facility] [address] [employer], T/S  Previous level of function: mod I  Roles and Routines: active but doesn't drive  Equipment Used at Home:  walker, rolling,cane, straight  Assistance upon Discharge: family    Pain/Comfort:  · Pain Rating 1: 0/10  · Pain Rating Post-Intervention 1: 0/10    Patients cultural, spiritual, Holiness conflicts given the current situation: no    Objective:     Communicated with: nurse prior to session.  Patient found HOB elevated with blood pressure cuff,peripheral IV,oxygen,telemetry,pulse ox (continuous) upon OT entry to room.    General Precautions: Standard, airborne,droplet,contact,fall,respiratory   Orthopedic Precautions:     Braces:    Respiratory Status: Nasal cannula, flow 12 L/min    Occupational Performance:    Bed Mobility:    · Patient completed Rolling/Turning to Left with  contact guard assistance  · Patient completed Scooting/Bridging with contact guard assistance  · Patient completed Supine to Sit with contact guard assistance  · Patient completed Sit to Supine with contact guard assistance    Functional Mobility/Transfers:  · Patient completed Sit <> Stand Transfer with minimum assistance  with  hand-held assist and of 2   · Functional Mobility: side stepped to HOB L max A heavy L lateral lean  Cognitive/Visual Perceptual:  AO4    Physical Exam:  BUE AROM/strength WFL  Good sit, poor+ stand balance    AMPAC 6 Click ADL:  AMPA Total Score: 15    Treatment & Education:  Pt/son educated on role of OT/POC. Sit to stand and side stepped max A HHA L lateral lean.   Education:    Patient left HOB elevated with all lines intact, call button in reach, nurse notified and son present    GOALS:   Multidisciplinary Problems     Occupational Therapy Goals        Problem: Occupational Therapy Goal    Goal Priority Disciplines Outcome Interventions   Occupational Therapy Goal     OT, PT/OT Ongoing, Progressing    Description: Goals to be met by: 2/28     Patient will increase functional independence with ADLs by performing:    UE Dressing with Supervision.  LE Dressing with Supervision.  Grooming while standing at sink with Supervision.  Toileting from toilet with Supervision for hygiene and clothing management.   Toilet transfer to toilet with Supervision.  Upper extremity exercise program x10 reps per handout, with supervision.                     History:     Past Medical History:   Diagnosis Date    Cataract     COVID-19 01/27/2022    GERD (gastroesophageal reflux disease)     Trigeminal neuralgia     Type 2 diabetes mellitus with hyperglycemia, without long-term current use of insulin 5/26/2021       Past Surgical History:    Procedure Laterality Date    CATARACT EXTRACTION         Time Tracking:     OT Date of Treatment: 01/28/22  OT Start Time: 0939  OT Stop Time: 1010  OT Total Time (min): 31 min w/PT    Billable Minutes:Evaluation 10  Therapeutic Activity 8    1/28/2022

## 2022-01-28 NOTE — ED NOTES
Report received by DEDRA Xiao. Pt on high flow O2 at 12L with O2 sat of 94%. Pt resting in NAD. Family at bedside.

## 2022-01-28 NOTE — ED PROVIDER NOTES
"Chief Complaint  Chief Complaint   Patient presents with    Weakness     Patient presents to the ED with family who reports patient has been weak,  having fever, body aches, and decreased appetite. States patient was seen at urgent care x 1 week ago, was covid negative, and prescribed doxycycline that is completed at this time. Family reports home oxygen saturation was reading "85%"    Fever    Cough       HPI  Norman Saunders is a 89 y.o. male who presents with generalized weakness and fatigue.  No asymmetric leg pain or swelling.  Patient recently tested negative COVID.  They prescribed antibiotics.  They report low oxygen has no history of using home oxygen or lung disease.    Past medical history  Past Medical History:   Diagnosis Date    Cataract     GERD (gastroesophageal reflux disease)     Trigeminal neuralgia     Type 2 diabetes mellitus with hyperglycemia, without long-term current use of insulin 5/26/2021       Current Medications    Current Facility-Administered Medications:     acetaminophen tablet 650 mg, 650 mg, Oral, Q4H PRN, Jory Jacobsen NP    [START ON 1/28/2022] albuterol inhaler 2 puff, 2 puff, Inhalation, Q6H **AND** [START ON 1/28/2022] MDI Q6H, , , Q6H, Jory Jacobsen NP    aluminum-magnesium hydroxide-simethicone 200-200-20 mg/5 mL suspension 30 mL, 30 mL, Oral, QID PRN, Jory Jacobsen NP    [START ON 1/28/2022] ascorbic acid (vitamin C) tablet 500 mg, 500 mg, Oral, BID, Jory Jacobsen NP    [START ON 1/28/2022] azithromycin 500 mg in dextrose 5 % 250 mL IVPB (ready to mix system), 500 mg, Intravenous, Q24H, Jory Jacobsen NP    [START ON 1/29/2022] azithromycin tablet 250 mg, 250 mg, Oral, Daily, Jory Jacobsen NP    benzonatate capsule 100 mg, 100 mg, Oral, TID PRN, Jory Jacobsen NP    [START ON 1/28/2022] cefTRIAXone (ROCEPHIN) 1 g/50 mL D5W IVPB, 1 g, Intravenous, Q24H, Jory Jacobsen NP    dexAMETHasone tablet 6 " mg, 6 mg, Oral, Daily, Jory Jacobsen NP    dextrose 10% bolus 125 mL, 12.5 g, Intravenous, PRN, Florencio Arevalo MD    dextrose 10% bolus 250 mL, 25 g, Intravenous, PRN, Florencio Arevalo MD    enoxaparin injection 80 mg, 1 mg/kg (Dosing Weight), Subcutaneous, BID, Jory Jacobsen NP    glucagon (human recombinant) injection 1 mg, 1 mg, Intramuscular, PRN, Jory Jacobsen NP    glucose chewable tablet 16 g, 16 g, Oral, PRN, Jory Jacobsen NP    glucose chewable tablet 24 g, 24 g, Oral, PRN, Jory Jacobsen NP    insulin aspart U-100 pen 0-5 Units, 0-5 Units, Subcutaneous, QID (AC + HS) PRN, Jory Jacobsen NP    levoFLOXacin 750 mg/150 mL IVPB 750 mg, 750 mg, Intravenous, ED 1 Time, Florencio Arevalo MD, Last Rate: 100 mL/hr at 01/27/22 2208, 750 mg at 01/27/22 2208    loperamide capsule 2 mg, 2 mg, Oral, Q6H PRN, Jory Jacobsen NP    melatonin tablet 6 mg, 6 mg, Oral, Nightly PRN, Jory Jacobsen NP    [START ON 1/28/2022] multivitamin tablet, 1 tablet, Oral, Daily, Jory Jacobsen NP    ondansetron injection 4 mg, 4 mg, Intravenous, Q8H PRN, Jory Jacobsen NP    [START ON 1/29/2022] remdesivir 100 mg in sodium chloride 0.9% 250 mL infusion, 100 mg, Intravenous, Daily, Jory Jacobsen NP    [START ON 1/28/2022] remdesivir 200 mg in sodium chloride 0.9% 250 mL infusion, 200 mg, Intravenous, Q24H, Jory Jacobsen NP    simethicone chewable tablet 80 mg, 1 tablet, Oral, QID PRN, Jory Jacobsen NP    Current Outpatient Medications:     citalopram (CELEXA) 10 MG tablet, Take 1 tablet (10 mg total) by mouth once daily., Disp: 30 tablet, Rfl: 11    clotrimazole (LOTRIMIN) 1 % Soln, Apply 4 drops to affected ear twice daily, Disp: 10 mL, Rfl: 2    diclofenac sodium (VOLTAREN) 1 % Gel, Apply 2 g topically once daily., Disp: 100 g, Rfl: 1    doxycycline (VIBRA-TABS) 100 MG tablet, Take 100 mg by mouth 2 (two) times  daily., Disp: , Rfl:     fluticasone propionate (FLONASE) 50 mcg/actuation nasal spray, 2 SPRAYS (100 MCG TOTAL) BY EACH NOSTRIL ROUTE ONCE DAILY., Disp: 48 mL, Rfl: 1    gabapentin (NEURONTIN) 800 MG tablet, Take 0.5 tablets (400 mg total) by mouth 2 (two) times daily., Disp: 180 tablet, Rfl: 3    meclizine (ANTIVERT) 12.5 mg tablet, Take 1 tablet (12.5 mg total) by mouth 2 (two) times daily as needed for Dizziness., Disp: 60 tablet, Rfl: 6    meloxicam (MOBIC) 7.5 MG tablet, TOME JAK TABLETA POR VIA ORAL A DIARIO CUANDO SEA NECESARIO PARA EL DOLOR, Disp: 30 tablet, Rfl: 0    metFORMIN (GLUCOPHAGE-XR) 500 MG ER 24hr tablet, Take 1 tablet (500 mg total) by mouth daily with breakfast., Disp: 90 tablet, Rfl: 3    omeprazole (PRILOSEC) 20 MG capsule, Take 1 capsule (20 mg total) by mouth once daily. Para el reflujo., Disp: 90 capsule, Rfl: 3    prednisoLONE acetate (PRED FORTE) 1 % DrpS, Place 1 drop into both eyes 4 (four) times daily., Disp: 1 Bottle, Rfl: 0    Allergies  Review of patient's allergies indicates:  No Known Allergies    Surgical history  Past Surgical History:   Procedure Laterality Date    CATARACT EXTRACTION         Social history  Social History     Socioeconomic History    Marital status:    Tobacco Use    Smoking status: Never Smoker   Substance and Sexual Activity    Alcohol use: No    Drug use: No       Family History  Family History   Problem Relation Age of Onset    Amblyopia Neg Hx     Blindness Neg Hx     Cancer Neg Hx     Cataracts Neg Hx     Diabetes Neg Hx     Glaucoma Neg Hx     Hypertension Neg Hx     Macular degeneration Neg Hx     Retinal detachment Neg Hx     Strabismus Neg Hx     Stroke Neg Hx     Thyroid disease Neg Hx        Review of systems  Skin: No rash, abscess, or laceration.  Neurologic: No new focal weakness or sensory changes.  All systems otherwise negative except as noted in ROS and HPI    Physical Exam  Vital signs: BP (!) 152/69   Pulse  "73   Temp 98.6 °F (37 °C) (Oral)   Resp (!) 31   Ht 5' 6" (1.676 m)   Wt 82.1 kg (181 lb)   SpO2 95%   BMI 29.21 kg/m²   Constitutional: No acute distress.  Well developed, alert, oriented and appropriate.  HENT: Normocephalic, atraumatic. Normal ear, nose, and throat.  Eyes: PERRL, EOMI, normal conjunctiva.  Neck: Normal range of motion, no tenderness; supple.  Respiratory: Nonlabored breathing with normal breath sounds.  Cardiovascular: RRR with no pulse deficit.  GI: Soft, nontender, no rebound or guarding.  Musculoskeletal: Normal ROM, no tenderness, injury, or edema.  Skin: Warm, dry skin without infection or injury.  Neurologic: Normal motor, sensation with no new focal deficit.  Psychiatric: Affect normal, judgement normal, mood normal.  No SI, HI, and not gravely disabled.    Labs  Pertinent labs reviewed (see chart for details)  Labs Reviewed   CBC W/ AUTO DIFFERENTIAL - Abnormal; Notable for the following components:       Result Value    Hemoglobin 13.6 (*)     Immature Granulocytes 0.6 (*)     All other components within normal limits   COMPREHENSIVE METABOLIC PANEL - Abnormal; Notable for the following components:    CO2 22 (*)     Glucose 125 (*)     Albumin 2.9 (*)     All other components within normal limits   C-REACTIVE PROTEIN - Abnormal; Notable for the following components:    .8 (*)     All other components within normal limits   SARS-COV-2 RDRP GENE - Abnormal; Notable for the following components:    POC Rapid COVID Positive (*)     All other components within normal limits    Narrative:     This test utilizes isothermal nucleic acid amplification                   technology to detect the SARS-CoV-2 RdRp nucleic acid segment.                   The analytical sensitivity (limit of detection) is 125 genome                   equivalents/mL.                   A POSITIVE result implies infection with the SARS-CoV-2 virus;                   the patient is presumed to be contagious.   "                   A NEGATIVE result means that SARS-CoV-2 nucleic acids are not                   present above the limit of detection. A NEGATIVE result should be                   treated as presumptive. It does not rule out the possibility of                   COVID-19 and should not be the sole basis for treatment decisions.                   If COVID-19 is strongly suspected based on clinical and exposure                   history, re-testing using an alternate molecular assay should be                   considered.                   This test is only for use under the Food and Drug                   Administration s Emergency Use Authorization (EUA).                   Commercial kits are provided by [employer].                   Performance characteristics of the EUA have been independently                   verified by [facility] Department of                   Pathology and Laboratory Medicine.                   _________________________________________________________________                   The authorized Fact Sheet for Healthcare Providers and the authorized Fact                   Sheet for Patients of the ID NOW COVID-19 are available on the FDA                   website:                                     https://www.fda.gov/media/514774/download                  https://www.fda.gov/media/352859/download                                       CULTURE, BLOOD   CULTURE, BLOOD   INFLUENZA A & B BY MOLECULAR   LACTATE DEHYDROGENASE   CK   LACTIC ACID, PLASMA   FERRITIN   TROPONIN I   B-TYPE NATRIURETIC PEPTIDE   PROCALCITONIN   SEDIMENTATION RATE   D DIMER, QUANTITATIVE   PROCALCITONIN   URINALYSIS, REFLEX TO URINE CULTURE       ECG  No results found for this or any previous visit.  ECG interpreted by ED MD    Radiology  X-Ray Chest AP Portable   Final Result      Low lung volumes with bilateral interstitial and patchy airspace opacities concerning for multifocal infection, including  viral or bacterial pneumonia.         Electronically signed by: Florencio Lima MD   Date:    01/27/2022   Time:    22:26          Procedures    Procedures    Medications   levoFLOXacin 750 mg/150 mL IVPB 750 mg (750 mg Intravenous New Bag 1/27/22 2208)   remdesivir 200 mg in sodium chloride 0.9% 250 mL infusion (has no administration in time range)   remdesivir 100 mg in sodium chloride 0.9% 250 mL infusion (has no administration in time range)   acetaminophen tablet 650 mg (has no administration in time range)   ondansetron injection 4 mg (has no administration in time range)   dexAMETHasone tablet 6 mg (has no administration in time range)   insulin aspart U-100 pen 0-5 Units (has no administration in time range)   ascorbic acid (vitamin C) tablet 500 mg (has no administration in time range)   multivitamin tablet (has no administration in time range)   melatonin tablet 6 mg (has no administration in time range)   simethicone chewable tablet 80 mg (has no administration in time range)   aluminum-magnesium hydroxide-simethicone 200-200-20 mg/5 mL suspension 30 mL (has no administration in time range)   enoxaparin injection 80 mg (has no administration in time range)   loperamide capsule 2 mg (has no administration in time range)   glucose chewable tablet 16 g (has no administration in time range)   glucose chewable tablet 24 g (has no administration in time range)   glucagon (human recombinant) injection 1 mg (has no administration in time range)   albuterol inhaler 2 puff (has no administration in time range)   benzonatate capsule 100 mg (has no administration in time range)   azithromycin 500 mg in dextrose 5 % 250 mL IVPB (ready to mix system) (has no administration in time range)   azithromycin tablet 250 mg (has no administration in time range)   cefTRIAXone (ROCEPHIN) 1 g/50 mL D5W IVPB (has no administration in time range)   dextrose 10% bolus 125 mL (has no administration in time range)   dextrose 10% bolus  250 mL (has no administration in time range)   diazePAM injection 5 mg (5 mg Intravenous Given 1/27/22 2300)       ED course    ED Course as of 01/27/22 2328   Thu Jan 27, 2022 2150 EKG shows normal sinus rhythm rate of 80 beats per minute with no ST elevation MI.  Normal QTC [MB]   2242 Paged IM [MB]      ED Course User Index  [MB] Florencio Arevalo MD         ED management:  Pt admitted to IM      Disposition    Patient admitted in stable condition      Final impression  1. COVID-19 virus infection    2. COVID-19    3. Chest pain        Critical care time spent with this patient was 0 minutes excluding the procedure time.              Florencio Arevalo MD  01/27/22 0022

## 2022-01-29 LAB
ALBUMIN SERPL BCP-MCNC: 2.7 G/DL (ref 3.5–5.2)
ALP SERPL-CCNC: 64 U/L (ref 55–135)
ALT SERPL W/O P-5'-P-CCNC: 16 U/L (ref 10–44)
ANION GAP SERPL CALC-SCNC: 11 MMOL/L (ref 8–16)
AST SERPL-CCNC: 20 U/L (ref 10–40)
BASOPHILS # BLD AUTO: 0.01 K/UL (ref 0–0.2)
BASOPHILS NFR BLD: 0.1 % (ref 0–1.9)
BILIRUB SERPL-MCNC: 0.3 MG/DL (ref 0.1–1)
BUN SERPL-MCNC: 30 MG/DL (ref 8–23)
CALCIUM SERPL-MCNC: 9.6 MG/DL (ref 8.7–10.5)
CHLORIDE SERPL-SCNC: 107 MMOL/L (ref 95–110)
CO2 SERPL-SCNC: 23 MMOL/L (ref 23–29)
CREAT SERPL-MCNC: 0.9 MG/DL (ref 0.5–1.4)
D DIMER PPP IA.FEU-MCNC: 0.2 MG/L FEU
DIFFERENTIAL METHOD: ABNORMAL
EOSINOPHIL # BLD AUTO: 0 K/UL (ref 0–0.5)
EOSINOPHIL NFR BLD: 0 % (ref 0–8)
ERYTHROCYTE [DISTWIDTH] IN BLOOD BY AUTOMATED COUNT: 14 % (ref 11.5–14.5)
EST. GFR  (AFRICAN AMERICAN): >60 ML/MIN/1.73 M^2
EST. GFR  (NON AFRICAN AMERICAN): >60 ML/MIN/1.73 M^2
GLUCOSE SERPL-MCNC: 140 MG/DL (ref 70–110)
HCT VFR BLD AUTO: 39 % (ref 40–54)
HGB BLD-MCNC: 12.5 G/DL (ref 14–18)
IMM GRANULOCYTES # BLD AUTO: 0.06 K/UL (ref 0–0.04)
IMM GRANULOCYTES NFR BLD AUTO: 0.7 % (ref 0–0.5)
LYMPHOCYTES # BLD AUTO: 0.7 K/UL (ref 1–4.8)
LYMPHOCYTES NFR BLD: 8.2 % (ref 18–48)
MCH RBC QN AUTO: 26.6 PG (ref 27–31)
MCHC RBC AUTO-ENTMCNC: 32.1 G/DL (ref 32–36)
MCV RBC AUTO: 83 FL (ref 82–98)
MONOCYTES # BLD AUTO: 0.7 K/UL (ref 0.3–1)
MONOCYTES NFR BLD: 7.8 % (ref 4–15)
NEUTROPHILS # BLD AUTO: 7 K/UL (ref 1.8–7.7)
NEUTROPHILS NFR BLD: 83.2 % (ref 38–73)
NRBC BLD-RTO: 0 /100 WBC
PLATELET # BLD AUTO: 399 K/UL (ref 150–450)
PMV BLD AUTO: 9.9 FL (ref 9.2–12.9)
POCT GLUCOSE: 151 MG/DL (ref 70–110)
POCT GLUCOSE: 172 MG/DL (ref 70–110)
POCT GLUCOSE: 177 MG/DL (ref 70–110)
POCT GLUCOSE: 260 MG/DL (ref 70–110)
POCT GLUCOSE: >500 MG/DL (ref 70–110)
POTASSIUM SERPL-SCNC: 3.7 MMOL/L (ref 3.5–5.1)
PROT SERPL-MCNC: 7.1 G/DL (ref 6–8.4)
RBC # BLD AUTO: 4.7 M/UL (ref 4.6–6.2)
SODIUM SERPL-SCNC: 141 MMOL/L (ref 136–145)
WBC # BLD AUTO: 8.41 K/UL (ref 3.9–12.7)

## 2022-01-29 PROCEDURE — 63600175 PHARM REV CODE 636 W HCPCS: Performed by: INTERNAL MEDICINE

## 2022-01-29 PROCEDURE — 85025 COMPLETE CBC W/AUTO DIFF WBC: CPT | Performed by: INTERNAL MEDICINE

## 2022-01-29 PROCEDURE — 63600175 PHARM REV CODE 636 W HCPCS

## 2022-01-29 PROCEDURE — 80053 COMPREHEN METABOLIC PANEL: CPT | Performed by: INTERNAL MEDICINE

## 2022-01-29 PROCEDURE — C9399 UNCLASSIFIED DRUGS OR BIOLOG: HCPCS | Performed by: INTERNAL MEDICINE

## 2022-01-29 PROCEDURE — 99900035 HC TECH TIME PER 15 MIN (STAT)

## 2022-01-29 PROCEDURE — 86140 C-REACTIVE PROTEIN: CPT | Performed by: INTERNAL MEDICINE

## 2022-01-29 PROCEDURE — 94799 UNLISTED PULMONARY SVC/PX: CPT

## 2022-01-29 PROCEDURE — 94640 AIRWAY INHALATION TREATMENT: CPT

## 2022-01-29 PROCEDURE — 27100171 HC OXYGEN HIGH FLOW UP TO 24 HOURS

## 2022-01-29 PROCEDURE — 25000003 PHARM REV CODE 250: Performed by: INTERNAL MEDICINE

## 2022-01-29 PROCEDURE — 11000001 HC ACUTE MED/SURG PRIVATE ROOM

## 2022-01-29 PROCEDURE — 94761 N-INVAS EAR/PLS OXIMETRY MLT: CPT

## 2022-01-29 PROCEDURE — 36415 COLL VENOUS BLD VENIPUNCTURE: CPT | Performed by: INTERNAL MEDICINE

## 2022-01-29 PROCEDURE — 25000003 PHARM REV CODE 250

## 2022-01-29 PROCEDURE — 25000242 PHARM REV CODE 250 ALT 637 W/ HCPCS: Performed by: INTERNAL MEDICINE

## 2022-01-29 PROCEDURE — 27000207 HC ISOLATION

## 2022-01-29 PROCEDURE — 85379 FIBRIN DEGRADATION QUANT: CPT

## 2022-01-29 PROCEDURE — 36415 COLL VENOUS BLD VENIPUNCTURE: CPT

## 2022-01-29 RX ORDER — POTASSIUM CHLORIDE 750 MG/1
30 TABLET, EXTENDED RELEASE ORAL ONCE
Status: COMPLETED | OUTPATIENT
Start: 2022-01-29 | End: 2022-01-29

## 2022-01-29 RX ORDER — PANTOPRAZOLE SODIUM 40 MG/1
40 TABLET, DELAYED RELEASE ORAL DAILY
Refills: 3 | Status: DISCONTINUED | OUTPATIENT
Start: 2022-01-29 | End: 2022-01-29

## 2022-01-29 RX ORDER — CETIRIZINE HYDROCHLORIDE 5 MG/1
5 TABLET ORAL DAILY
Status: DISCONTINUED | OUTPATIENT
Start: 2022-01-29 | End: 2022-01-29

## 2022-01-29 RX ORDER — FLUTICASONE PROPIONATE 50 MCG
2 SPRAY, SUSPENSION (ML) NASAL DAILY
Status: DISCONTINUED | OUTPATIENT
Start: 2022-01-29 | End: 2022-02-02 | Stop reason: HOSPADM

## 2022-01-29 RX ORDER — PREDNISOLONE ACETATE 10 MG/ML
1 SUSPENSION/ DROPS OPHTHALMIC 4 TIMES DAILY
Status: DISCONTINUED | OUTPATIENT
Start: 2022-01-29 | End: 2022-02-02 | Stop reason: HOSPADM

## 2022-01-29 RX ORDER — FUROSEMIDE 10 MG/ML
20 INJECTION INTRAMUSCULAR; INTRAVENOUS ONCE
Status: COMPLETED | OUTPATIENT
Start: 2022-01-29 | End: 2022-01-29

## 2022-01-29 RX ADMIN — INSULIN ASPART 1 UNITS: 100 INJECTION, SOLUTION INTRAVENOUS; SUBCUTANEOUS at 10:01

## 2022-01-29 RX ADMIN — ALBUTEROL SULFATE 2 PUFF: 90 AEROSOL, METERED RESPIRATORY (INHALATION) at 12:01

## 2022-01-29 RX ADMIN — POTASSIUM CHLORIDE 30 MEQ: 750 TABLET, EXTENDED RELEASE ORAL at 09:01

## 2022-01-29 RX ADMIN — ALBUTEROL SULFATE 2 PUFF: 90 AEROSOL, METERED RESPIRATORY (INHALATION) at 07:01

## 2022-01-29 RX ADMIN — FUROSEMIDE 20 MG: 10 INJECTION, SOLUTION INTRAVENOUS at 09:01

## 2022-01-29 RX ADMIN — FLUTICASONE PROPIONATE 100 MCG: 50 SPRAY, METERED NASAL at 02:01

## 2022-01-29 RX ADMIN — OXYCODONE HYDROCHLORIDE AND ACETAMINOPHEN 500 MG: 500 TABLET ORAL at 09:01

## 2022-01-29 RX ADMIN — ENOXAPARIN SODIUM 80 MG: 100 INJECTION SUBCUTANEOUS at 09:01

## 2022-01-29 RX ADMIN — GUAIFENESIN AND DEXTROMETHORPHAN HYDROBROMIDE 1 TABLET: 600; 30 TABLET, EXTENDED RELEASE ORAL at 12:01

## 2022-01-29 RX ADMIN — ENOXAPARIN SODIUM 80 MG: 100 INJECTION SUBCUTANEOUS at 10:01

## 2022-01-29 RX ADMIN — DEXAMETHASONE 6 MG: 4 TABLET ORAL at 09:01

## 2022-01-29 RX ADMIN — PREDNISOLONE ACETATE 1 DROP: 10 SUSPENSION/ DROPS OPHTHALMIC at 06:01

## 2022-01-29 RX ADMIN — PREDNISOLONE ACETATE 1 DROP: 10 SUSPENSION/ DROPS OPHTHALMIC at 09:01

## 2022-01-29 RX ADMIN — GUAIFENESIN AND DEXTROMETHORPHAN HYDROBROMIDE 1 TABLET: 600; 30 TABLET, EXTENDED RELEASE ORAL at 09:01

## 2022-01-29 RX ADMIN — PANTOPRAZOLE SODIUM 40 MG: 40 TABLET, DELAYED RELEASE ORAL at 09:01

## 2022-01-29 RX ADMIN — REMDESIVIR 100 MG: 100 INJECTION, POWDER, LYOPHILIZED, FOR SOLUTION INTRAVENOUS at 09:01

## 2022-01-29 RX ADMIN — BARICITINIB 2 MG: 2 TABLET, FILM COATED ORAL at 12:01

## 2022-01-29 RX ADMIN — THERA TABS 1 TABLET: TAB at 09:01

## 2022-01-29 NOTE — SUBJECTIVE & OBJECTIVE
"Interval History: ***    Review of Systems  Objective:     Vital Signs (Most Recent):  Temp: 97.5 °F (36.4 °C) (01/29/22 0754)  Pulse: 80 (01/29/22 0754)  Resp: 18 (01/29/22 0754)  BP: (!) 156/70 (01/29/22 0754)  SpO2: (!) 91 % (01/29/22 0754) Vital Signs (24h Range):  Temp:  [95.7 °F (35.4 °C)-98 °F (36.7 °C)] 97.5 °F (36.4 °C)  Pulse:  [58-87] 80  Resp:  [18-20] 18  SpO2:  [91 %-96 %] 91 %  BP: (105-160)/(59-90) 156/70     Weight: 71.6 kg (157 lb 13.6 oz)  Body mass index is 25.48 kg/m².    Intake/Output Summary (Last 24 hours) at 1/29/2022 1114  Last data filed at 1/29/2022 0806  Gross per 24 hour   Intake 118 ml   Output 600 ml   Net -482 ml      Physical Exam    Significant Labs: {Results:28467::"All pertinent labs within the past 24 hours have been reviewed."}    Significant Imaging: {Imaging Review:17067::"I have reviewed all pertinent imaging results/findings within the past 24 hours."}  "

## 2022-01-29 NOTE — ASSESSMENT & PLAN NOTE
Patient with Hypoxic respiratory failure which is Acute which is not related to a recent procedure.  he is not on home oxygen. Supplemental oxygen was provided and noted- Nasal Cannula 10 LPM  Signs/symptoms of respiratory failure include- tachypnea, increased work of breathing, respiratory distress and use of accessory muscles. Contributing diagnoses includes - Pneumonia and covid-19 infection Labs and images were reviewed. Patient Has not has a recent ABG.   Will treat underlying causes and adjust management of respiratory failure as follows- Continue supplemental oxygen, albuterol INH for SOB/wheezing

## 2022-01-29 NOTE — ASSESSMENT & PLAN NOTE
- COVID positive 1/27/22; initiated on protocol  - Isolation: Airborne/Droplet. Surgical mask on patient. Notify Infection Control  - CXR with low lung volumes with bilateral interstitial and patchy airspace opacities concerning for multifocal infection, including viral or bacterial pneumonia., requiring O2  - Monitor on Telemetry  - initiated on dexamethasone, will continue for 10d course  - initiated on remdesivir x5d; daily CMP  -Patient received Levofloxacin IV x1  - initiated on ceftriaxone/azith x5d due to concern for superimposed CAP  - .8; D-dimer pending; procalcitonin pending  - Order RT consult via Respiratory Communication for COVID Protocols  - Incentive Spirometer Q4h  - Continuous Pulse Oximetry, goal SpO2 92-96%  - supplemental O2 PRN, will wean as tolerated  - Albuterol INH Q6h scheduled & PRN   - MVI & ascorbic acid 500mg PO BID  -Anti-tussives for cough  - Acetaminophen Q6hr PRN fever/headache  - Loperamide PRN viral diarrhea  - VTE PPx: enoxaparin   - PT/OT for debility/weakness  - If deterioration, may warrant trial of NIPPV in neg pressure room or immediate ICU consult  Low suspicion for superimposed bacterial infection, discontinue abx  Cont decadron/remdesivir  Add baricitinib  Give dose of lasix 20mg ivp  Wean off supp O2 as tolerated

## 2022-01-29 NOTE — PROGRESS NOTES
[facility] Medicine  Progress Note    Patient Name: Norman Saunders  MRN: [id number]  Patient Class: IP- Inpatient   Admission Date: 1/27/2022  Length of Stay: 2 days  Attending Physician: Maksim Valentine MD  Primary Care Provider: Darshana Brandon MD        Subjective:     Principal Problem:Acute respiratory failure due to COVID-19        HPI:  Norman Saunders is an 88 y/o female with PMHx DMII, GERD, Major Depression Disorder presents to [facility] ED with complaints of weakness, fever, body aches, and decreased appetite. Patient states he was recently seen at an urgent care for his symptoms one week ago and tested negative for COVID-19. He was discharged with doxycycline which he completed his course of therapy. Family reports patient's home oxygen saturation was 85% on room air. Patient denies chest pain, palpitations, or leg swelling. Patient denies N/V/D. In ED: Labs remarkable for CRP for 157.8. CXR demonstrated low lung volumes with bilateral interstitial and patchy airspace opacities concerning for multifocal infection, including viral or bacterial pneumonia. Blood and urine cultures pending. COVID-19 protocol initiated. Will admit to hospital medicine for further evaluation and treatment.         Overview/Hospital Course:  No notes on file    Interval History: Weaned down to 10L HF. Resting comfortably w/out resp distress. No acute concerns.      Review of Systems   Constitutional: Positive for fatigue. Negative for activity change, appetite change, chills, diaphoresis, fever and unexpected weight change.   Respiratory: Positive for cough and shortness of breath. Negative for chest tightness, wheezing and stridor.    Cardiovascular: Negative for chest pain, palpitations and leg swelling.   Gastrointestinal: Negative for abdominal pain, blood in stool, constipation, diarrhea, nausea and vomiting.   Endocrine: Negative for cold intolerance and heat intolerance.   Genitourinary: Negative for difficulty  urinating, dysuria, flank pain and frequency.   Musculoskeletal: Negative for arthralgias, joint swelling and myalgias.   Skin: Negative.    Neurological: Negative for dizziness, weakness, light-headedness and headaches.     Objective:     Vital Signs (Most Recent):  Temp: 97.5 °F (36.4 °C) (01/29/22 0754)  Pulse: 80 (01/29/22 0754)  Resp: 18 (01/29/22 0754)  BP: (!) 156/70 (01/29/22 0754)  SpO2: (!) 91 % (01/29/22 0754) Vital Signs (24h Range):  Temp:  [95.7 °F (35.4 °C)-98 °F (36.7 °C)] 97.5 °F (36.4 °C)  Pulse:  [58-87] 80  Resp:  [18-20] 18  SpO2:  [91 %-96 %] 91 %  BP: (105-160)/(59-90) 156/70     Weight: 71.6 kg (157 lb 13.6 oz)  Body mass index is 25.48 kg/m².    Intake/Output Summary (Last 24 hours) at 1/29/2022 1116  Last data filed at 1/29/2022 0806  Gross per 24 hour   Intake 118 ml   Output 600 ml   Net -482 ml      Physical Exam  Constitutional:       General: He is not in acute distress.     Appearance: He is ill-appearing.   HENT:      Head: Normocephalic and atraumatic.   Eyes:      Conjunctiva/sclera: Conjunctivae normal.      Pupils: Pupils are equal, round, and reactive to light.   Cardiovascular:      Rate and Rhythm: Normal rate and regular rhythm.      Heart sounds: Normal heart sounds.   Pulmonary:      Effort: Pulmonary effort is normal. No respiratory distress.      Breath sounds: Rales present.   Abdominal:      General: Bowel sounds are normal.      Palpations: Abdomen is soft.   Musculoskeletal:         General: Normal range of motion.      Cervical back: Normal range of motion and neck supple.   Skin:     General: Skin is warm and dry.   Neurological:      General: No focal deficit present.      Mental Status: He is alert and oriented to person, place, and time.         Significant Labs: All pertinent labs within the past 24 hours have been reviewed.    Significant Imaging: I have reviewed all pertinent imaging results/findings within the past 24 hours.      Assessment/Plan:      * Acute  respiratory failure due to COVID-19  Patient with Hypoxic respiratory failure which is Acute which is not related to a recent procedure.  he is not on home oxygen. Supplemental oxygen was provided and noted- Nasal Cannula 10 LPM  Signs/symptoms of respiratory failure include- tachypnea, increased work of breathing, respiratory distress and use of accessory muscles. Contributing diagnoses includes - Pneumonia and covid-19 infection Labs and images were reviewed. Patient Has not has a recent ABG.   Will treat underlying causes and adjust management of respiratory failure as follows- Continue supplemental oxygen, albuterol INH for SOB/wheezing                COVID-19 Multifocal pneumonia  - COVID positive 1/27/22; initiated on protocol  - Isolation: Airborne/Droplet. Surgical mask on patient. Notify Infection Control  - CXR with low lung volumes with bilateral interstitial and patchy airspace opacities concerning for multifocal infection, including viral or bacterial pneumonia., requiring O2  - Monitor on Telemetry  - initiated on dexamethasone, will continue for 10d course  - initiated on remdesivir x5d; daily CMP  -Patient received Levofloxacin IV x1  - initiated on ceftriaxone/azith x5d due to concern for superimposed CAP  - .8; D-dimer pending; procalcitonin pending  - Order RT consult via Respiratory Communication for COVID Protocols  - Incentive Spirometer Q4h  - Continuous Pulse Oximetry, goal SpO2 92-96%  - supplemental O2 PRN, will wean as tolerated  - Albuterol INH Q6h scheduled & PRN   - MVI & ascorbic acid 500mg PO BID  -Anti-tussives for cough  - Acetaminophen Q6hr PRN fever/headache  - Loperamide PRN viral diarrhea  - VTE PPx: enoxaparin   - PT/OT for debility/weakness  - If deterioration, may warrant trial of NIPPV in neg pressure room or immediate ICU consult  Low suspicion for superimposed bacterial infection, discontinue abx  Cont decadron/remdesivir  Add baricitinib  Give dose of lasix 20mg  ivp  Wean off supp O2 as tolerated        Major depressive disorder  Stable  Reports does not take celexa anymore  Will continue to monitor      Type 2 diabetes mellitus with hyperglycemia, without long-term current use of insulin  Hemoglobin A1C   Date Value Ref Range Status   08/20/2021 6.6 (H) 4.0 - 5.6 % Final     Comment:     ADA Screening Guidelines:  5.7-6.4%  Consistent with prediabetes  >or=6.5%  Consistent with diabetes    High levels of fetal hemoglobin interfere with the HbA1C  assay. Heterozygous hemoglobin variants (HbS, HgC, etc)do  not significantly interfere with this assay.   However, presence of multiple variants may affect accuracy.                       Patient's FSGs are controlled on current medication regimen.  - home regimen includes metformin  - hold home oral medications  -CBG goal 140-180  - LDSSI with ACCUcheck ACHS  - Diabetic diet  -Hypoglycemia protocol PRN        GERD (gastroesophageal reflux disease)  Pantoprazole 40mg PO daily        VTE Risk Mitigation (From admission, onward)         Ordered     enoxaparin injection 80 mg  2 times daily         01/27/22 2254     IP VTE HIGH RISK PATIENT  Once         01/27/22 2254     Place sequential compression device  Until discontinued         01/27/22 2254                Discharge Planning   STEFANIE:      Code Status: Prior   Is the patient medically ready for discharge?:     Reason for patient still in hospital (select all that apply): Patient trending condition and Treatment                     Maksim Valentine MD  Department of Hospital Medicine   [place] - [facility] [place]

## 2022-01-29 NOTE — PLAN OF CARE
Safety maintained, bed alarm on and call bell in reach. Telemetry monitoring in progress NSR noted. Scheduled medication given and tolerated well.confused at times. Son at bedside. Plan of care reviewed and questions answered. Alert, oriented to person and place. No distress respiratory distress noted. SpO2 91-93% on O2@ 12L hi-flow. Will continue to monitor Q 2 hr and prn

## 2022-01-29 NOTE — PLAN OF CARE
Admission assessment completed  Problem: Adult Inpatient Plan of Care  Goal: Plan of Care Review  Outcome: Ongoing, Progressing  Goal: Patient-Specific Goal (Individualized)  Outcome: Ongoing, Progressing  Goal: Absence of Hospital-Acquired Illness or Injury  Outcome: Ongoing, Progressing  Goal: Optimal Comfort and Wellbeing  Outcome: Ongoing, Progressing  Goal: Readiness for Transition of Care  Outcome: Ongoing, Progressing     Problem: Diabetes Comorbidity  Goal: Blood Glucose Level Within Targeted Range  Outcome: Ongoing, Progressing     Problem: Fluid Imbalance (Pneumonia)  Goal: Fluid Balance  Outcome: Ongoing, Progressing     Problem: Infection (Pneumonia)  Goal: Resolution of Infection Signs and Symptoms  Outcome: Ongoing, Progressing     Problem: Respiratory Compromise (Pneumonia)  Goal: Effective Oxygenation and Ventilation  Outcome: Ongoing, Progressing

## 2022-01-29 NOTE — SUBJECTIVE & OBJECTIVE
Interval History: Weaned down to 10L HF. Resting comfortably w/out resp distress. No acute concerns.      Review of Systems   Constitutional: Positive for fatigue. Negative for activity change, appetite change, chills, diaphoresis, fever and unexpected weight change.   Respiratory: Positive for cough and shortness of breath. Negative for chest tightness, wheezing and stridor.    Cardiovascular: Negative for chest pain, palpitations and leg swelling.   Gastrointestinal: Negative for abdominal pain, blood in stool, constipation, diarrhea, nausea and vomiting.   Endocrine: Negative for cold intolerance and heat intolerance.   Genitourinary: Negative for difficulty urinating, dysuria, flank pain and frequency.   Musculoskeletal: Negative for arthralgias, joint swelling and myalgias.   Skin: Negative.    Neurological: Negative for dizziness, weakness, light-headedness and headaches.     Objective:     Vital Signs (Most Recent):  Temp: 97.5 °F (36.4 °C) (01/29/22 0754)  Pulse: 80 (01/29/22 0754)  Resp: 18 (01/29/22 0754)  BP: (!) 156/70 (01/29/22 0754)  SpO2: (!) 91 % (01/29/22 0754) Vital Signs (24h Range):  Temp:  [95.7 °F (35.4 °C)-98 °F (36.7 °C)] 97.5 °F (36.4 °C)  Pulse:  [58-87] 80  Resp:  [18-20] 18  SpO2:  [91 %-96 %] 91 %  BP: (105-160)/(59-90) 156/70     Weight: 71.6 kg (157 lb 13.6 oz)  Body mass index is 25.48 kg/m².    Intake/Output Summary (Last 24 hours) at 1/29/2022 1116  Last data filed at 1/29/2022 0806  Gross per 24 hour   Intake 118 ml   Output 600 ml   Net -482 ml      Physical Exam  Constitutional:       General: He is not in acute distress.     Appearance: He is ill-appearing.   HENT:      Head: Normocephalic and atraumatic.   Eyes:      Conjunctiva/sclera: Conjunctivae normal.      Pupils: Pupils are equal, round, and reactive to light.   Cardiovascular:      Rate and Rhythm: Normal rate and regular rhythm.      Heart sounds: Normal heart sounds.   Pulmonary:      Effort: Pulmonary effort is  normal. No respiratory distress.      Breath sounds: Rales present.   Abdominal:      General: Bowel sounds are normal.      Palpations: Abdomen is soft.   Musculoskeletal:         General: Normal range of motion.      Cervical back: Normal range of motion and neck supple.   Skin:     General: Skin is warm and dry.   Neurological:      General: No focal deficit present.      Mental Status: He is alert and oriented to person, place, and time.         Significant Labs: All pertinent labs within the past 24 hours have been reviewed.    Significant Imaging: I have reviewed all pertinent imaging results/findings within the past 24 hours.

## 2022-01-30 LAB
ANION GAP SERPL CALC-SCNC: 13 MMOL/L (ref 8–16)
BUN SERPL-MCNC: 36 MG/DL (ref 8–23)
CALCIUM SERPL-MCNC: 9.5 MG/DL (ref 8.7–10.5)
CHLORIDE SERPL-SCNC: 109 MMOL/L (ref 95–110)
CO2 SERPL-SCNC: 22 MMOL/L (ref 23–29)
CREAT SERPL-MCNC: 0.9 MG/DL (ref 0.5–1.4)
CRP SERPL-MCNC: 33.2 MG/L (ref 0–8.2)
EST. GFR  (AFRICAN AMERICAN): >60 ML/MIN/1.73 M^2
EST. GFR  (NON AFRICAN AMERICAN): >60 ML/MIN/1.73 M^2
GLUCOSE SERPL-MCNC: 118 MG/DL (ref 70–110)
MAGNESIUM SERPL-MCNC: 2.1 MG/DL (ref 1.6–2.6)
POCT GLUCOSE: 113 MG/DL (ref 70–110)
POCT GLUCOSE: 161 MG/DL (ref 70–110)
POCT GLUCOSE: 173 MG/DL (ref 70–110)
POCT GLUCOSE: 226 MG/DL (ref 70–110)
POTASSIUM SERPL-SCNC: 3.8 MMOL/L (ref 3.5–5.1)
SODIUM SERPL-SCNC: 144 MMOL/L (ref 136–145)

## 2022-01-30 PROCEDURE — 11000001 HC ACUTE MED/SURG PRIVATE ROOM

## 2022-01-30 PROCEDURE — 27100171 HC OXYGEN HIGH FLOW UP TO 24 HOURS

## 2022-01-30 PROCEDURE — 94799 UNLISTED PULMONARY SVC/PX: CPT

## 2022-01-30 PROCEDURE — 63600175 PHARM REV CODE 636 W HCPCS

## 2022-01-30 PROCEDURE — 25000003 PHARM REV CODE 250

## 2022-01-30 PROCEDURE — 27000207 HC ISOLATION

## 2022-01-30 PROCEDURE — 83735 ASSAY OF MAGNESIUM: CPT | Performed by: INTERNAL MEDICINE

## 2022-01-30 PROCEDURE — 94761 N-INVAS EAR/PLS OXIMETRY MLT: CPT

## 2022-01-30 PROCEDURE — 25000003 PHARM REV CODE 250: Performed by: INTERNAL MEDICINE

## 2022-01-30 PROCEDURE — C9399 UNCLASSIFIED DRUGS OR BIOLOG: HCPCS | Performed by: INTERNAL MEDICINE

## 2022-01-30 PROCEDURE — 36415 COLL VENOUS BLD VENIPUNCTURE: CPT | Performed by: INTERNAL MEDICINE

## 2022-01-30 PROCEDURE — 63600175 PHARM REV CODE 636 W HCPCS: Performed by: INTERNAL MEDICINE

## 2022-01-30 PROCEDURE — 99900035 HC TECH TIME PER 15 MIN (STAT)

## 2022-01-30 PROCEDURE — 25000242 PHARM REV CODE 250 ALT 637 W/ HCPCS: Performed by: INTERNAL MEDICINE

## 2022-01-30 PROCEDURE — 80048 BASIC METABOLIC PNL TOTAL CA: CPT | Performed by: INTERNAL MEDICINE

## 2022-01-30 PROCEDURE — 94640 AIRWAY INHALATION TREATMENT: CPT

## 2022-01-30 RX ADMIN — REMDESIVIR 100 MG: 100 INJECTION, POWDER, LYOPHILIZED, FOR SOLUTION INTRAVENOUS at 10:01

## 2022-01-30 RX ADMIN — DEXAMETHASONE 6 MG: 4 TABLET ORAL at 09:01

## 2022-01-30 RX ADMIN — ALBUTEROL SULFATE 2 PUFF: 90 AEROSOL, METERED RESPIRATORY (INHALATION) at 12:01

## 2022-01-30 RX ADMIN — OXYCODONE HYDROCHLORIDE AND ACETAMINOPHEN 500 MG: 500 TABLET ORAL at 10:01

## 2022-01-30 RX ADMIN — INSULIN ASPART 2 UNITS: 100 INJECTION, SOLUTION INTRAVENOUS; SUBCUTANEOUS at 05:01

## 2022-01-30 RX ADMIN — BARICITINIB 2 MG: 2 TABLET, FILM COATED ORAL at 09:01

## 2022-01-30 RX ADMIN — ALBUTEROL SULFATE 2 PUFF: 90 AEROSOL, METERED RESPIRATORY (INHALATION) at 07:01

## 2022-01-30 RX ADMIN — PREDNISOLONE ACETATE 1 DROP: 10 SUSPENSION/ DROPS OPHTHALMIC at 04:01

## 2022-01-30 RX ADMIN — ENOXAPARIN SODIUM 80 MG: 100 INJECTION SUBCUTANEOUS at 10:01

## 2022-01-30 RX ADMIN — PANTOPRAZOLE SODIUM 40 MG: 40 TABLET, DELAYED RELEASE ORAL at 09:01

## 2022-01-30 RX ADMIN — FLUTICASONE PROPIONATE 100 MCG: 50 SPRAY, METERED NASAL at 09:01

## 2022-01-30 RX ADMIN — PREDNISOLONE ACETATE 1 DROP: 10 SUSPENSION/ DROPS OPHTHALMIC at 09:01

## 2022-01-30 RX ADMIN — GUAIFENESIN AND DEXTROMETHORPHAN HYDROBROMIDE 1 TABLET: 600; 30 TABLET, EXTENDED RELEASE ORAL at 09:01

## 2022-01-30 RX ADMIN — THERA TABS 1 TABLET: TAB at 09:01

## 2022-01-30 RX ADMIN — PREDNISOLONE ACETATE 1 DROP: 10 SUSPENSION/ DROPS OPHTHALMIC at 10:01

## 2022-01-30 RX ADMIN — GUAIFENESIN AND DEXTROMETHORPHAN HYDROBROMIDE 1 TABLET: 600; 30 TABLET, EXTENDED RELEASE ORAL at 10:01

## 2022-01-30 RX ADMIN — OXYCODONE HYDROCHLORIDE AND ACETAMINOPHEN 500 MG: 500 TABLET ORAL at 09:01

## 2022-01-30 RX ADMIN — PREDNISOLONE ACETATE 1 DROP: 10 SUSPENSION/ DROPS OPHTHALMIC at 12:01

## 2022-01-30 RX ADMIN — ENOXAPARIN SODIUM 80 MG: 100 INJECTION SUBCUTANEOUS at 09:01

## 2022-01-30 NOTE — ASSESSMENT & PLAN NOTE
Patient with Hypoxic respiratory failure which is Acute which is not related to a recent procedure.  he is not on home oxygen. Supplemental oxygen was provided and noted- Nasal Cannula 7 LPM  Signs/symptoms of respiratory failure include- tachypnea, increased work of breathing, respiratory distress and use of accessory muscles. Contributing diagnoses includes - Pneumonia and covid-19 infection Labs and images were reviewed. Patient Has not has a recent ABG.   Will treat underlying causes and adjust management of respiratory failure as follows- Continue supplemental oxygen, albuterol INH for SOB/wheezing

## 2022-01-30 NOTE — NURSING
Patient is awake and oriented to self. [place] speaking, Video remote  used. Care plan explained to patient and son at bedside; verbalized understanding. On 9L High Flow N/C , O2 saturation at 93-94%. Hooked to heart monitor, running normal sinus rhythm at 65-73bpm. Condom cath in place draining clear yellow urine to gravity. No pain/N/V/D during shift. Due medications given. Contact, Airborne, and Droplet precautions maintained. Encouraged to turn every 2 hours as tolerated. Maintained on fall risk precautions. Bed in lowest position, bed alarm on, call light/personal items within reach and instructed to call for help when needed. Will continue to monitor.

## 2022-01-30 NOTE — PROGRESS NOTES
[facility]  Progress Note    Patient Name: Norman Saunders  MRN: [id number]  Patient Class: IP- Inpatient   Admission Date: 1/27/2022  Length of Stay: 3 days  Attending Physician: Maksim Valentine MD  Primary Care Provider: Darshana Brandon MD        Subjective:     Principal Problem:Acute respiratory failure due to COVID-19        HPI:  Norman Saunders is an 89 y/o female with PMHx DMII, GERD, Major Depression Disorder presents to [facility] ED with complaints of weakness, fever, body aches, and decreased appetite. Patient states he was recently seen at an urgent care for his symptoms one week ago and tested negative for COVID-19. He was discharged with doxycycline which he completed his course of therapy. Family reports patient's home oxygen saturation was 85% on room air. Patient denies chest pain, palpitations, or leg swelling. Patient denies N/V/D. In ED: Labs remarkable for CRP for 157.8. CXR demonstrated low lung volumes with bilateral interstitial and patchy airspace opacities concerning for multifocal infection, including viral or bacterial pneumonia. Blood and urine cultures pending. COVID-19 protocol initiated. Will admit to hospital medicine for further evaluation and treatment.         Overview/Hospital Course:  No notes on file    Interval History: O2 requirements improving. Weaned down to 6L HFNC. Resting comfortably w/out resp distress. No acute concerns.      Review of Systems   Constitutional: Positive for fatigue. Negative for activity change, appetite change, chills, diaphoresis, fever and unexpected weight change.   Respiratory: Positive for cough and shortness of breath. Negative for chest tightness, wheezing and stridor.    Cardiovascular: Negative for chest pain, palpitations and leg swelling.   Gastrointestinal: Negative for abdominal pain, blood in stool, constipation, diarrhea, nausea and vomiting.   Endocrine: Negative for cold intolerance and heat intolerance.    Genitourinary: Negative for difficulty urinating, dysuria, flank pain and frequency.   Musculoskeletal: Negative for arthralgias, joint swelling and myalgias.   Skin: Negative.    Neurological: Negative for dizziness, weakness, light-headedness and headaches.     Objective:     Vital Signs (Most Recent):  Temp: 97.6 °F (36.4 °C) (01/30/22 1116)  Pulse: 78 (01/30/22 1226)  Resp: 16 (01/30/22 1226)  BP: (!) 144/65 (01/30/22 1116)  SpO2: (!) 90 % (01/30/22 1226) Vital Signs (24h Range):  Temp:  [97.6 °F (36.4 °C)-98.6 °F (37 °C)] 97.6 °F (36.4 °C)  Pulse:  [58-78] 78  Resp:  [16-20] 16  SpO2:  [90 %-97 %] 90 %  BP: (116-144)/(58-82) 144/65     Weight: 71.6 kg (157 lb 13.6 oz)  Body mass index is 25.48 kg/m².    Intake/Output Summary (Last 24 hours) at 1/30/2022 1403  Last data filed at 1/30/2022 1104  Gross per 24 hour   Intake 350 ml   Output 100 ml   Net 250 ml      Physical Exam  Constitutional:       General: He is not in acute distress.     Appearance: He is ill-appearing.   HENT:      Head: Normocephalic and atraumatic.   Eyes:      Conjunctiva/sclera: Conjunctivae normal.      Pupils: Pupils are equal, round, and reactive to light.   Cardiovascular:      Rate and Rhythm: Normal rate and regular rhythm.      Heart sounds: Normal heart sounds.   Pulmonary:      Effort: Pulmonary effort is normal. No respiratory distress.      Breath sounds: Rales present.   Abdominal:      General: Bowel sounds are normal.      Palpations: Abdomen is soft.   Musculoskeletal:         General: Normal range of motion.      Cervical back: Normal range of motion and neck supple.   Skin:     General: Skin is warm and dry.   Neurological:      General: No focal deficit present.      Mental Status: He is alert and oriented to person, place, and time.         Significant Labs: All pertinent labs within the past 24 hours have been reviewed.    Significant Imaging: I have reviewed all pertinent imaging results/findings within the past 24  hours.      Assessment/Plan:      * Acute respiratory failure due to COVID-19  Patient with Hypoxic respiratory failure which is Acute which is not related to a recent procedure.  he is not on home oxygen. Supplemental oxygen was provided and noted- Nasal Cannula 7 LPM  Signs/symptoms of respiratory failure include- tachypnea, increased work of breathing, respiratory distress and use of accessory muscles. Contributing diagnoses includes - Pneumonia and covid-19 infection Labs and images were reviewed. Patient Has not has a recent ABG.   Will treat underlying causes and adjust management of respiratory failure as follows- Continue supplemental oxygen, albuterol INH for SOB/wheezing                COVID-19 Multifocal pneumonia  - COVID positive 1/27/22; initiated on protocol  - Isolation: Airborne/Droplet. Surgical mask on patient. Notify Infection Control  - CXR with low lung volumes with bilateral interstitial and patchy airspace opacities concerning for multifocal infection, including viral or bacterial pneumonia., requiring O2  - Monitor on Telemetry  - initiated on dexamethasone, will continue for 10d course  - initiated on remdesivir x5d; daily CMP  -Patient received Levofloxacin IV x1  - initiated on ceftriaxone/azith x5d due to concern for superimposed CAP  - .8; D-dimer pending; procalcitonin pending  - Order RT consult via Respiratory Communication for COVID Protocols  - Incentive Spirometer Q4h  - Continuous Pulse Oximetry, goal SpO2 92-96%  - supplemental O2 PRN, will wean as tolerated  - Albuterol INH Q6h scheduled & PRN   - MVI & ascorbic acid 500mg PO BID  -Anti-tussives for cough  - Acetaminophen Q6hr PRN fever/headache  - Loperamide PRN viral diarrhea  - VTE PPx: enoxaparin   - PT/OT for debility/weakness  - If deterioration, may warrant trial of NIPPV in neg pressure room or immediate ICU consult  Low suspicion for superimposed bacterial infection, discontinue abx  Cont  decadron/remdesivir  Cont baricitinib  Wean off supp O2 as tolerated  Keep neg fluid balance    Major depressive disorder  Stable  Reports does not take celexa anymore  Will continue to monitor      Type 2 diabetes mellitus with hyperglycemia, without long-term current use of insulin  Hemoglobin A1C   Date Value Ref Range Status   08/20/2021 6.6 (H) 4.0 - 5.6 % Final     Comment:     ADA Screening Guidelines:  5.7-6.4%  Consistent with prediabetes  >or=6.5%  Consistent with diabetes    High levels of fetal hemoglobin interfere with the HbA1C  assay. Heterozygous hemoglobin variants (HbS, HgC, etc)do  not significantly interfere with this assay.   However, presence of multiple variants may affect accuracy.                       Patient's FSGs are controlled on current medication regimen.  - home regimen includes metformin  - hold home oral medications  -CBG goal 140-180  - LDSSI with ACCUcheck ACHS  - Diabetic diet  -Hypoglycemia protocol PRN        GERD (gastroesophageal reflux disease)  Pantoprazole 40mg PO daily        VTE Risk Mitigation (From admission, onward)         Ordered     enoxaparin injection 80 mg  2 times daily         01/27/22 2254     IP VTE HIGH RISK PATIENT  Once         01/27/22 2254     Place sequential compression device  Until discontinued         01/27/22 2254                Discharge Planning   STEFANIE:      Code Status: Prior   Is the patient medically ready for discharge?:     Reason for patient still in hospital (select all that apply): Patient trending condition and Treatment                     Maksim Valentine MD  Department of Hospital Medicine   [place] [facility]

## 2022-01-30 NOTE — PLAN OF CARE
VN note: Patient chart, labs, and vitals reviewed. VN to continue to be available as needed.     Problem: Adult Inpatient Plan of Care  Goal: Plan of Care Review  Outcome: Ongoing, Progressing  Goal: Patient-Specific Goal (Individualized)  Outcome: Ongoing, Progressing  Goal: Absence of Hospital-Acquired Illness or Injury  Outcome: Ongoing, Progressing  Goal: Optimal Comfort and Wellbeing  Outcome: Ongoing, Progressing  Goal: Readiness for Transition of Care  Outcome: Ongoing, Progressing     Problem: Diabetes Comorbidity  Goal: Blood Glucose Level Within Targeted Range  Outcome: Ongoing, Progressing     Problem: Fluid Imbalance (Pneumonia)  Goal: Fluid Balance  Outcome: Ongoing, Progressing     Problem: Infection (Pneumonia)  Goal: Resolution of Infection Signs and Symptoms  Outcome: Ongoing, Progressing     Problem: Respiratory Compromise (Pneumonia)  Goal: Effective Oxygenation and Ventilation  Outcome: Ongoing, Progressing     Problem: Skin Injury Risk Increased  Goal: Skin Health and Integrity  Outcome: Ongoing, Progressing     Problem: Gas Exchange Impaired  Goal: Optimal Gas Exchange  Outcome: Ongoing, Progressing

## 2022-01-30 NOTE — ASSESSMENT & PLAN NOTE
- COVID positive 1/27/22; initiated on protocol  - Isolation: Airborne/Droplet. Surgical mask on patient. Notify Infection Control  - CXR with low lung volumes with bilateral interstitial and patchy airspace opacities concerning for multifocal infection, including viral or bacterial pneumonia., requiring O2  - Monitor on Telemetry  - initiated on dexamethasone, will continue for 10d course  - initiated on remdesivir x5d; daily CMP  -Patient received Levofloxacin IV x1  - initiated on ceftriaxone/azith x5d due to concern for superimposed CAP  - .8; D-dimer pending; procalcitonin pending  - Order RT consult via Respiratory Communication for COVID Protocols  - Incentive Spirometer Q4h  - Continuous Pulse Oximetry, goal SpO2 92-96%  - supplemental O2 PRN, will wean as tolerated  - Albuterol INH Q6h scheduled & PRN   - MVI & ascorbic acid 500mg PO BID  -Anti-tussives for cough  - Acetaminophen Q6hr PRN fever/headache  - Loperamide PRN viral diarrhea  - VTE PPx: enoxaparin   - PT/OT for debility/weakness  - If deterioration, may warrant trial of NIPPV in neg pressure room or immediate ICU consult  Low suspicion for superimposed bacterial infection, discontinue abx  Cont decadron/remdesivir  Cont baricitinib  Wean off supp O2 as tolerated  Keep neg fluid balance

## 2022-01-30 NOTE — SUBJECTIVE & OBJECTIVE
Interval History: O2 requirements improving. Weaned down to 6L HFNC. Resting comfortably w/out resp distress. No acute concerns.      Review of Systems   Constitutional: Positive for fatigue. Negative for activity change, appetite change, chills, diaphoresis, fever and unexpected weight change.   Respiratory: Positive for cough and shortness of breath. Negative for chest tightness, wheezing and stridor.    Cardiovascular: Negative for chest pain, palpitations and leg swelling.   Gastrointestinal: Negative for abdominal pain, blood in stool, constipation, diarrhea, nausea and vomiting.   Endocrine: Negative for cold intolerance and heat intolerance.   Genitourinary: Negative for difficulty urinating, dysuria, flank pain and frequency.   Musculoskeletal: Negative for arthralgias, joint swelling and myalgias.   Skin: Negative.    Neurological: Negative for dizziness, weakness, light-headedness and headaches.     Objective:     Vital Signs (Most Recent):  Temp: 97.6 °F (36.4 °C) (01/30/22 1116)  Pulse: 78 (01/30/22 1226)  Resp: 16 (01/30/22 1226)  BP: (!) 144/65 (01/30/22 1116)  SpO2: (!) 90 % (01/30/22 1226) Vital Signs (24h Range):  Temp:  [97.6 °F (36.4 °C)-98.6 °F (37 °C)] 97.6 °F (36.4 °C)  Pulse:  [58-78] 78  Resp:  [16-20] 16  SpO2:  [90 %-97 %] 90 %  BP: (116-144)/(58-82) 144/65     Weight: 71.6 kg (157 lb 13.6 oz)  Body mass index is 25.48 kg/m².    Intake/Output Summary (Last 24 hours) at 1/30/2022 1403  Last data filed at 1/30/2022 1104  Gross per 24 hour   Intake 350 ml   Output 100 ml   Net 250 ml      Physical Exam  Constitutional:       General: He is not in acute distress.     Appearance: He is ill-appearing.   HENT:      Head: Normocephalic and atraumatic.   Eyes:      Conjunctiva/sclera: Conjunctivae normal.      Pupils: Pupils are equal, round, and reactive to light.   Cardiovascular:      Rate and Rhythm: Normal rate and regular rhythm.      Heart sounds: Normal heart sounds.   Pulmonary:       Effort: Pulmonary effort is normal. No respiratory distress.      Breath sounds: Rales present.   Abdominal:      General: Bowel sounds are normal.      Palpations: Abdomen is soft.   Musculoskeletal:         General: Normal range of motion.      Cervical back: Normal range of motion and neck supple.   Skin:     General: Skin is warm and dry.   Neurological:      General: No focal deficit present.      Mental Status: He is alert and oriented to person, place, and time.         Significant Labs: All pertinent labs within the past 24 hours have been reviewed.    Significant Imaging: I have reviewed all pertinent imaging results/findings within the past 24 hours.

## 2022-01-31 LAB
CRP SERPL-MCNC: 9.9 MG/L (ref 0–8.2)
D DIMER PPP IA.FEU-MCNC: <0.19 MG/L FEU
POCT GLUCOSE: 112 MG/DL (ref 70–110)
POCT GLUCOSE: 139 MG/DL (ref 70–110)
POCT GLUCOSE: 163 MG/DL (ref 70–110)
POCT GLUCOSE: 234 MG/DL (ref 70–110)

## 2022-01-31 PROCEDURE — 25000003 PHARM REV CODE 250

## 2022-01-31 PROCEDURE — 25000242 PHARM REV CODE 250 ALT 637 W/ HCPCS: Performed by: INTERNAL MEDICINE

## 2022-01-31 PROCEDURE — 94640 AIRWAY INHALATION TREATMENT: CPT

## 2022-01-31 PROCEDURE — 94761 N-INVAS EAR/PLS OXIMETRY MLT: CPT

## 2022-01-31 PROCEDURE — 94799 UNLISTED PULMONARY SVC/PX: CPT

## 2022-01-31 PROCEDURE — 86140 C-REACTIVE PROTEIN: CPT | Performed by: INTERNAL MEDICINE

## 2022-01-31 PROCEDURE — 25000003 PHARM REV CODE 250: Performed by: INTERNAL MEDICINE

## 2022-01-31 PROCEDURE — 97116 GAIT TRAINING THERAPY: CPT

## 2022-01-31 PROCEDURE — 63600175 PHARM REV CODE 636 W HCPCS

## 2022-01-31 PROCEDURE — 85379 FIBRIN DEGRADATION QUANT: CPT

## 2022-01-31 PROCEDURE — 27100171 HC OXYGEN HIGH FLOW UP TO 24 HOURS

## 2022-01-31 PROCEDURE — 63600175 PHARM REV CODE 636 W HCPCS: Performed by: INTERNAL MEDICINE

## 2022-01-31 PROCEDURE — 11000001 HC ACUTE MED/SURG PRIVATE ROOM

## 2022-01-31 PROCEDURE — C9399 UNCLASSIFIED DRUGS OR BIOLOG: HCPCS | Performed by: INTERNAL MEDICINE

## 2022-01-31 PROCEDURE — 27000207 HC ISOLATION

## 2022-01-31 PROCEDURE — 97530 THERAPEUTIC ACTIVITIES: CPT

## 2022-01-31 RX ADMIN — ALBUTEROL SULFATE 2 PUFF: 90 AEROSOL, METERED RESPIRATORY (INHALATION) at 12:01

## 2022-01-31 RX ADMIN — PANTOPRAZOLE SODIUM 40 MG: 40 TABLET, DELAYED RELEASE ORAL at 09:01

## 2022-01-31 RX ADMIN — ALBUTEROL SULFATE 2 PUFF: 90 AEROSOL, METERED RESPIRATORY (INHALATION) at 07:01

## 2022-01-31 RX ADMIN — OXYCODONE HYDROCHLORIDE AND ACETAMINOPHEN 500 MG: 500 TABLET ORAL at 09:01

## 2022-01-31 RX ADMIN — ENOXAPARIN SODIUM 80 MG: 100 INJECTION SUBCUTANEOUS at 09:01

## 2022-01-31 RX ADMIN — FLUTICASONE PROPIONATE 100 MCG: 50 SPRAY, METERED NASAL at 09:01

## 2022-01-31 RX ADMIN — PREDNISOLONE ACETATE 1 DROP: 10 SUSPENSION/ DROPS OPHTHALMIC at 09:01

## 2022-01-31 RX ADMIN — DEXAMETHASONE 6 MG: 4 TABLET ORAL at 09:01

## 2022-01-31 RX ADMIN — PREDNISOLONE ACETATE 1 DROP: 10 SUSPENSION/ DROPS OPHTHALMIC at 12:01

## 2022-01-31 RX ADMIN — BARICITINIB 2 MG: 2 TABLET, FILM COATED ORAL at 09:01

## 2022-01-31 RX ADMIN — GUAIFENESIN AND DEXTROMETHORPHAN HYDROBROMIDE 1 TABLET: 600; 30 TABLET, EXTENDED RELEASE ORAL at 09:01

## 2022-01-31 RX ADMIN — PREDNISOLONE ACETATE 1 DROP: 10 SUSPENSION/ DROPS OPHTHALMIC at 04:01

## 2022-01-31 RX ADMIN — REMDESIVIR 100 MG: 100 INJECTION, POWDER, LYOPHILIZED, FOR SOLUTION INTRAVENOUS at 09:01

## 2022-01-31 RX ADMIN — THERA TABS 1 TABLET: TAB at 09:01

## 2022-01-31 RX ADMIN — INSULIN ASPART 2 UNITS: 100 INJECTION, SOLUTION INTRAVENOUS; SUBCUTANEOUS at 04:01

## 2022-01-31 NOTE — PT/OT/SLP PROGRESS
Physical Therapy Treatment    Patient Name:  Norman Saunders   MRN:  [id number]    Recommendations:     Discharge Recommendations:  home health PT   Discharge Equipment Recommendations: shower chair   Barriers to Discharge: None    Assessment:     Norman Saunders is a 89 y.o. male admitted with a medical diagnosis of Acute respiratory failure due to COVID-19.  He presents with the following impairments/functional limitations:  weakness,impaired endurance,impaired self care skills,impaired functional mobilty,gait instability,impaired balance,impaired cognition,impaired coordination,impaired cardiopulmonary response to activity. Pt making significant progress with functional mobility and activity tolerance this date as she ambulated 3 bouts, up to 35 ft with no AD and CGA. Recommending HH PT upon d/c.    Rehab Prognosis: Good; patient would benefit from acute skilled PT services to address these deficits and reach maximum level of function.    Recent Surgery: * No surgery found *      Plan:     During this hospitalization, patient to be seen 2 x/week to address the identified rehab impairments via gait training,therapeutic activities,therapeutic exercises,neuromuscular re-education and progress toward the following goals:    · Plan of Care Expires:  02/28/22    Subjective     Chief Complaint: none  Patient/Family Comments/goals: pt is Kisw[place]ili speaking, utilized [employer]  Krista ID#: [id number] - pt requires repetition of commands, unsure if due to pt being [place] or confusion  Pain/Comfort:  · Pain Rating 1: 0/10  · Pain Rating Post-Intervention 1: 0/10      Objective:     Communicated with nurse West prior to session.  Patient found up in chair with telemetry,pulse ox (continuous) upon PT entry to room.     General Precautions: Standard, airborne,contact,droplet,fall   Orthopedic Precautions:N/A   Braces: N/A  Respiratory Status: Nasal cannula, flow 4 L/min     Functional Mobility:  · Transfers:     · Sit to  Stand:  min A for 1st stand then progressed to SBA with no AD  · Gait: 10 ft, 20 ft, and 35 ft with no AD and CGA - ambulates with forward flexed trunk, decreased step length, shuffled, mild instability but no overt LOB.      AM-PAC 6 CLICK MOBILITY  Turning over in bed (including adjusting bedclothes, sheets and blankets)?: 3  Sitting down on and standing up from a chair with arms (e.g., wheelchair, bedside commode, etc.): 3  Moving from lying on back to sitting on the side of the bed?: 3  Moving to and from a bed to a chair (including a wheelchair)?: 3  Need to walk in hospital room?: 3  Climbing 3-5 steps with a railing?: 3  Basic Mobility Total Score: 18       Therapeutic Activities and Exercises:  Educated pt on role of PT and pt agreeable to participate in therapy session.  Pt up in chair when PT entered.  Instructed in LAQs and hip flexion.  On 4 L O2 and SaO2 was 92% at rest.  Completed 3 bouts of gait as reported above with 2-3 min seated rest break between bouts. SaO2 decreased as low as 85% then increased within ~30 sec to 88-90%.  Left up in chair.    Patient left up in chair with all lines intact, call button in reach, chair alarm on and nurse notified..    GOALS:   Multidisciplinary Problems     Physical Therapy Goals        Problem: Physical Therapy Goal    Goal Priority Disciplines Outcome Goal Variances Interventions   Physical Therapy Goal     PT, PT/OT Ongoing, Progressing     Description: Goals to be met by: 22     Patient will increase functional independence with mobility by performin. Supine <> sit with Stand-by Assistance  2. Sit to stand transfer with Stand-by Assistance  3. Bed to chair transfer with Stand-by Assistance using Rolling Walker  4. Gait  x 50 feet with Stand-by Assistance using Rolling Walker.   5. Lower extremity exercise program x 10 reps per handout, with supervision                     Time Tracking:     PT Received On: 22  PT Start Time: 5     PT Stop  Time: 1057  PT Total Time (min): 32 min     Billable Minutes: Gait Training 25    Treatment Type: Treatment  PT/PTA: PT     PTA Visit Number: 0     01/31/2022

## 2022-01-31 NOTE — PLAN OF CARE
Problem: Physical Therapy Goal  Goal: Physical Therapy Goal  Description: Goals to be met by: 22     Patient will increase functional independence with mobility by performin. Supine <> sit with Stand-by Assistance  2. Sit to stand transfer with Stand-by Assistance  3. Bed to chair transfer with Stand-by Assistance using Rolling Walker  4. Gait  x 50 feet with Stand-by Assistance using Rolling Walker.   5. Lower extremity exercise program x 10 reps per handout, with supervision    Outcome: Ongoing, Progressing     Pt making significant progress with functional mobility and activity tolerance this date as she ambulated 3 bouts, up to 35 ft with no AD and CGA. Recommending  PT upon d/c.

## 2022-01-31 NOTE — ASSESSMENT & PLAN NOTE
Patient with Hypoxic respiratory failure which is Acute which is not related to a recent procedure.  he is not on home oxygen. Supplemental oxygen was provided and noted- Nasal Cannula 4 LPM  Signs/symptoms of respiratory failure include- tachypnea, increased work of breathing, respiratory distress and use of accessory muscles. Contributing diagnoses includes - Pneumonia and covid-19 infection Labs and images were reviewed. Patient Has not has a recent ABG.   Will treat underlying causes and adjust management of respiratory failure as follows- Continue supplemental oxygen, albuterol INH for SOB/wheezing

## 2022-01-31 NOTE — PLAN OF CARE
"   01/31/22 142   Post-Acute Status   Post-Acute Authorization Home Health   Home Health Status Referrals Sent  (Pt C19+. on 4L NC at 96%. Therapy recs noted for HH. Referral sent.)     No future appointments.    /64 (BP Location: Left arm, Patient Position: Sitting)   Pulse 76   Temp 96.2 °F (35.7 °C) (Oral)   Resp 20   Ht 5' 6" (1.676 m)   Wt 71.6 kg (157 lb 13.6 oz)   SpO2 96%   BMI 25.48 kg/m²      albuterol  2 puff Inhalation Q6H    ascorbic acid (vitamin C)  500 mg Oral BID    baricitinib  2 mg Oral Daily    dexAMETHasone  6 mg Oral Daily    dextromethorphan-guaiFENesin  mg  1 tablet Oral BID    enoxaparin  1 mg/kg (Dosing Weight) Subcutaneous BID    fluticasone propionate  2 spray Each Nostril Daily    multivitamin  1 tablet Oral Daily    pantoprazole  40 mg Oral Daily    prednisoLONE acetate  1 drop Both Eyes QID    remdesivir infusion  100 mg Intravenous Daily       "

## 2022-01-31 NOTE — SUBJECTIVE & OBJECTIVE
Interval History: O2 requirements improving. Weaned down to 4L NC. Resting comfortably w/out resp distress. Pt states he feels better.     Review of Systems   Constitutional: Positive for fatigue. Negative for activity change, appetite change, chills, diaphoresis, fever and unexpected weight change.   Respiratory: Positive for cough and shortness of breath. Negative for chest tightness, wheezing and stridor.    Cardiovascular: Negative for chest pain, palpitations and leg swelling.   Gastrointestinal: Negative for abdominal pain, blood in stool, constipation, diarrhea, nausea and vomiting.   Endocrine: Negative for cold intolerance and heat intolerance.   Genitourinary: Negative for difficulty urinating, dysuria, flank pain and frequency.   Musculoskeletal: Negative for arthralgias, joint swelling and myalgias.   Skin: Negative.    Neurological: Negative for dizziness, weakness, light-headedness and headaches.     Objective:     Vital Signs (Most Recent):  Temp: 96.2 °F (35.7 °C) (01/31/22 1111)  Pulse: 71 (01/31/22 1111)  Resp: 18 (01/31/22 1111)  BP: 119/64 (01/31/22 1111)  SpO2: (!) 92 % (01/31/22 1111) Vital Signs (24h Range):  Temp:  [96.2 °F (35.7 °C)-98.6 °F (37 °C)] 96.2 °F (35.7 °C)  Pulse:  [52-81] 71  Resp:  [16-20] 18  SpO2:  [90 %-97 %] 92 %  BP: (119-141)/(61-78) 119/64     Weight: 71.6 kg (157 lb 13.6 oz)  Body mass index is 25.48 kg/m².    Intake/Output Summary (Last 24 hours) at 1/31/2022 1135  Last data filed at 1/31/2022 1015  Gross per 24 hour   Intake 730 ml   Output 1806 ml   Net -1076 ml      Physical Exam  Constitutional:       General: He is not in acute distress.     Appearance: He is ill-appearing.   HENT:      Head: Normocephalic and atraumatic.   Eyes:      Conjunctiva/sclera: Conjunctivae normal.      Pupils: Pupils are equal, round, and reactive to light.   Cardiovascular:      Rate and Rhythm: Normal rate and regular rhythm.      Heart sounds: Normal heart sounds.   Pulmonary:       Effort: Pulmonary effort is normal. No respiratory distress.      Breath sounds: Rales present.   Abdominal:      General: Bowel sounds are normal.      Palpations: Abdomen is soft.   Musculoskeletal:         General: Normal range of motion.      Cervical back: Normal range of motion and neck supple.   Skin:     General: Skin is warm and dry.   Neurological:      General: No focal deficit present.      Mental Status: He is alert and oriented to person, place, and time.         Significant Labs: All pertinent labs within the past 24 hours have been reviewed.    Significant Imaging: I have reviewed all pertinent imaging results/findings within the past 24 hours.

## 2022-01-31 NOTE — PROGRESS NOTES
[facility] Medicine  Progress Note    Patient Name: Norman Saunders  MRN: [id number]  Patient Class: IP- Inpatient   Admission Date: 1/27/2022  Length of Stay: 4 days  Attending Physician: Maksim Valentine MD  Primary Care Provider: Darshana Brandon MD        Subjective:     Principal Problem:Acute respiratory failure due to COVID-19        HPI:  Norman Saunders is an 88 y/o female with PMHx DMII, GERD, Major Depression Disorder presents to [facility] ED with complaints of weakness, fever, body aches, and decreased appetite. Patient states he was recently seen at an urgent care for his symptoms one week ago and tested negative for COVID-19. He was discharged with doxycycline which he completed his course of therapy. Family reports patient's home oxygen saturation was 85% on room air. Patient denies chest pain, palpitations, or leg swelling. Patient denies N/V/D. In ED: Labs remarkable for CRP for 157.8. CXR demonstrated low lung volumes with bilateral interstitial and patchy airspace opacities concerning for multifocal infection, including viral or bacterial pneumonia. Blood and urine cultures pending. COVID-19 protocol initiated. Will admit to hospital medicine for further evaluation and treatment.         Overview/Hospital Course:  No notes on file    Interval History: O2 requirements improving. Weaned down to 4L NC. Resting comfortably w/out resp distress. Pt states he feels better.     Review of Systems   Constitutional: Positive for fatigue. Negative for activity change, appetite change, chills, diaphoresis, fever and unexpected weight change.   Respiratory: Positive for cough and shortness of breath. Negative for chest tightness, wheezing and stridor.    Cardiovascular: Negative for chest pain, palpitations and leg swelling.   Gastrointestinal: Negative for abdominal pain, blood in stool, constipation, diarrhea, nausea and vomiting.   Endocrine: Negative for cold intolerance and heat intolerance.  Problem: Patient Education: Go to Patient Education Activity  Goal: Patient/Family Education  Outcome: Progressing Towards Goal     Problem: Normal [place]: 24 to 48 hours  Goal: Activity/Safety  Outcome: Progressing Towards Goal  Goal: Nutrition/Diet  Outcome: Progressing Towards Goal  Goal: Discharge Planning  Outcome: Progressing Towards Goal  Goal: Treatments/Interventions/Procedures  Outcome: Progressing Towards Goal  Goal: *Appropriate parent-infant bonding  Outcome: Progressing Towards Goal   Genitourinary: Negative for difficulty urinating, dysuria, flank pain and frequency.   Musculoskeletal: Negative for arthralgias, joint swelling and myalgias.   Skin: Negative.    Neurological: Negative for dizziness, weakness, light-headedness and headaches.     Objective:     Vital Signs (Most Recent):  Temp: 96.2 °F (35.7 °C) (01/31/22 1111)  Pulse: 71 (01/31/22 1111)  Resp: 18 (01/31/22 1111)  BP: 119/64 (01/31/22 1111)  SpO2: (!) 92 % (01/31/22 1111) Vital Signs (24h Range):  Temp:  [96.2 °F (35.7 °C)-98.6 °F (37 °C)] 96.2 °F (35.7 °C)  Pulse:  [52-81] 71  Resp:  [16-20] 18  SpO2:  [90 %-97 %] 92 %  BP: (119-141)/(61-78) 119/64     Weight: 71.6 kg (157 lb 13.6 oz)  Body mass index is 25.48 kg/m².    Intake/Output Summary (Last 24 hours) at 1/31/2022 1135  Last data filed at 1/31/2022 1015  Gross per 24 hour   Intake 730 ml   Output 1806 ml   Net -1076 ml      Physical Exam  Constitutional:       General: He is not in acute distress.     Appearance: He is ill-appearing.   HENT:      Head: Normocephalic and atraumatic.   Eyes:      Conjunctiva/sclera: Conjunctivae normal.      Pupils: Pupils are equal, round, and reactive to light.   Cardiovascular:      Rate and Rhythm: Normal rate and regular rhythm.      Heart sounds: Normal heart sounds.   Pulmonary:      Effort: Pulmonary effort is normal. No respiratory distress.      Breath sounds: Rales present.   Abdominal:      General: Bowel sounds are normal.      Palpations: Abdomen is soft.   Musculoskeletal:         General: Normal range of motion.      Cervical back: Normal range of motion and neck supple.   Skin:     General: Skin is warm and dry.   Neurological:      General: No focal deficit present.      Mental Status: He is alert and oriented to person, place, and time.         Significant Labs: All pertinent labs within the past 24 hours have been reviewed.    Significant Imaging: I have reviewed all pertinent imaging results/findings within the past 24  hours.      Assessment/Plan:      * Acute respiratory failure due to COVID-19  Patient with Hypoxic respiratory failure which is Acute which is not related to a recent procedure.  he is not on home oxygen. Supplemental oxygen was provided and noted- Nasal Cannula 4 LPM  Signs/symptoms of respiratory failure include- tachypnea, increased work of breathing, respiratory distress and use of accessory muscles. Contributing diagnoses includes - Pneumonia and covid-19 infection Labs and images were reviewed. Patient Has not has a recent ABG.   Will treat underlying causes and adjust management of respiratory failure as follows- Continue supplemental oxygen, albuterol INH for SOB/wheezing                COVID-19 Multifocal pneumonia  - COVID positive 1/27/22; initiated on protocol  - Isolation: Airborne/Droplet. Surgical mask on patient. Notify Infection Control  - CXR with low lung volumes with bilateral interstitial and patchy airspace opacities concerning for multifocal infection, including viral or bacterial pneumonia., requiring O2  - Monitor on Telemetry  - initiated on dexamethasone, will continue for 10d course  - initiated on remdesivir x5d; daily CMP  -Patient received Levofloxacin IV x1  - initiated on ceftriaxone/azith x5d due to concern for superimposed CAP  - .8; D-dimer pending; procalcitonin pending  - Order RT consult via Respiratory Communication for COVID Protocols  - Incentive Spirometer Q4h  - Continuous Pulse Oximetry, goal SpO2 92-96%  - supplemental O2 PRN, will wean as tolerated  - Albuterol INH Q6h scheduled & PRN   - MVI & ascorbic acid 500mg PO BID  -Anti-tussives for cough  - Acetaminophen Q6hr PRN fever/headache  - Loperamide PRN viral diarrhea  - VTE PPx: enoxaparin   - PT/OT for debility/weakness  - If deterioration, may warrant trial of NIPPV in neg pressure room or immediate ICU consult  Low suspicion for superimposed bacterial infection, discontinue abx  Cont  decadron/remdesivir  Cont baricitinib  Wean off supp O2 as tolerated  Keep neg fluid balance    Major depressive disorder  Stable  Reports does not take celexa anymore  Will continue to monitor      Type 2 diabetes mellitus with hyperglycemia, without long-term current use of insulin  Hemoglobin A1C   Date Value Ref Range Status   08/20/2021 6.6 (H) 4.0 - 5.6 % Final     Comment:     ADA Screening Guidelines:  5.7-6.4%  Consistent with prediabetes  >or=6.5%  Consistent with diabetes    High levels of fetal hemoglobin interfere with the HbA1C  assay. Heterozygous hemoglobin variants (HbS, HgC, etc)do  not significantly interfere with this assay.   However, presence of multiple variants may affect accuracy.                       Patient's FSGs are controlled on current medication regimen.  - home regimen includes metformin  - hold home oral medications  -CBG goal 140-180  - LDSSI with ACCUcheck ACHS  - Diabetic diet  -Hypoglycemia protocol PRN        GERD (gastroesophageal reflux disease)  Pantoprazole 40mg PO daily        VTE Risk Mitigation (From admission, onward)         Ordered     enoxaparin injection 80 mg  2 times daily         01/27/22 2254     IP VTE HIGH RISK PATIENT  Once         01/27/22 2254     Place sequential compression device  Until discontinued         01/27/22 2254                Discharge Planning   STEFANIE:      Code Status: Prior   Is the patient medically ready for discharge?:     Reason for patient still in hospital (select all that apply): Patient trending condition and Treatment                     Maksim Valentine MD  Department of Hospital Medicine   [place] [facility]

## 2022-01-31 NOTE — PLAN OF CARE
Pt has met goals. Currently SBA-Mod I basic ADLs and mobility    Problem: Occupational Therapy Goal  Goal: Occupational Therapy Goal  Description: Goals to be met by: 2/28     Patient will increase functional independence with ADLs by performing:    UE Dressing with Supervision.  LE Dressing with Supervision.  Grooming while standing at sink with Supervision.  Toileting from toilet with Supervision for hygiene and clothing management.   Toilet transfer to toilet with Supervision.  Upper extremity exercise program x10 reps per handout, with supervision.    Outcome: Met

## 2022-01-31 NOTE — PT/OT/SLP PROGRESS
Occupational Therapy   Treatment/Discharge    Name: Norman Saunders  MRN: [id number]  Admitting Diagnosis:  Acute respiratory failure due to COVID-19       Recommendations:     Discharge Recommendations: home health PT  Discharge Equipment Recommendations:  shower chair  Barriers to discharge:  None    Assessment:     Norman Saunders is a 89 y.o. male with a medical diagnosis of Acute respiratory failure due to COVID-19.  He presents with performance deficits affecting function are impaired endurance.     Rehab Prognosis:  Good;      Plan:     · Patient to be discontinued from OT services at this time Plan of Care Expires: 02/28/22  · Plan of Care Reviewed with: patient,son    Subjective     Pain/Comfort:  · Pain Rating 1: 0/10  · Pain Rating Post-Intervention 1: 0/10    Objective:     Communicated with: nurse prior to session.  Patient found up in chair with telemetry,pulse ox (continuous),oxygen,peripheral IV upon OT entry to room.    General Precautions: Standard, airborne,droplet,fall,contact   Orthopedic Precautions:    Braces:    Respiratory Status: Nasal cannula, flow 4 L/min     Occupational Performance:     Functional Mobility/Transfers:  · Patient completed Sit <> Stand Transfer with modified independence  with  straight cane   · Patient completed Bed <> Chair Transfer using Step Transfer technique with modified independence and supervision with straight cane  · Patient completed Toilet Transfer Step Transfer technique with stand by assistance with  no AD per RN  · Functional Mobility: supervision-mod I in room w/st cane total of ~80'    Activities of Daily Living:  · Lower Body Dressing: modified independence socks  · Toileting: modified independence and supervision per RN      [facility] 6 Click ADL: 24    Treatment & Education:  Pt performed sit to stand multiple trials SBA-mod I with and without st cane. Performed functional mobility in room with st cane, no LOB, or SOB noted.  Pt with increased indep for ADLs  and fnl mobility. Pt and son both feel pt is functioning at baseline, however with decreased endurance.    Patient left up in chair with all lines intact, call button in reach, chair alarm on, nurse notified and 2son presentEducation:      GOALS:   Multidisciplinary Problems     Occupational Therapy Goals     Not on file          Multidisciplinary Problems (Resolved)        Problem: Occupational Therapy Goal    Goal Priority Disciplines Outcome Interventions   Occupational Therapy Goal   (Resolved)     OT, PT/OT Met    Description: Goals to be met by: 2/28     Patient will increase functional independence with ADLs by performing:    UE Dressing with Supervision.  LE Dressing with Supervision.  Grooming while standing at sink with Supervision.  Toileting from toilet with Supervision for hygiene and clothing management.   Toilet transfer to toilet with Supervision.  Upper extremity exercise program x10 reps per handout, with supervision.                     Time Tracking:     OT Date of Treatment: 01/31/22  OT Start Time: 1420  OT Stop Time: 1451  OT Total Time (min): 31 min    Billable Minutes:Therapeutic Activity 31 1/31/2022

## 2022-01-31 NOTE — PLAN OF CARE
"Pt oriented. Pt Hungarian speaking only. DCA done via telephone with pt on room phone. Language line used for  Genna #[id number] Demographics/Ins/NextofKin/PCP verified with patient/family. Denies any DME needs at present from pt/family. Patient/family plans to return home with family. Educated on bedside RX. Patient consents for [place] to discuss discharge plan with next of kin. Preferences appointment times and location obtained. Patient reports they will have transportation home upon discharge.    Pt has DME as listed below.    No future appointments.    BP (!) 151/66 (BP Location: Right arm, Patient Position: Sitting)   Pulse 71   Temp 96.2 °F (35.7 °C) (Oral)   Resp 18   Ht 5' 6" (1.676 m)   Wt 71.6 kg (157 lb 13.6 oz)   SpO2 (!) 94%   BMI 25.48 kg/m²      albuterol  2 puff Inhalation Q6H    ascorbic acid (vitamin C)  500 mg Oral BID    baricitinib  2 mg Oral Daily    dexAMETHasone  6 mg Oral Daily    dextromethorphan-guaiFENesin  mg  1 tablet Oral BID    enoxaparin  1 mg/kg (Dosing Weight) Subcutaneous BID    fluticasone propionate  2 spray Each Nostril Daily    multivitamin  1 tablet Oral Daily    pantoprazole  40 mg Oral Daily    prednisoLONE acetate  1 drop Both Eyes QID    remdesivir infusion  100 mg Intravenous Daily        01/31/22 1635   Discharge Planning   Resource/Environmental Concerns none   Support Systems Children;Spouse/significant other   Equipment Currently Used at Home cane, straight;walker, standard;walker, rolling   Current Living Arrangements home/apartment/condo   Patient/Family Anticipates Transition to home;home with family   Discharge Plan A Home;Home with family;Home Health     "

## 2022-02-01 LAB
ALBUMIN SERPL BCP-MCNC: 2.9 G/DL (ref 3.5–5.2)
ALP SERPL-CCNC: 62 U/L (ref 55–135)
ALT SERPL W/O P-5'-P-CCNC: 35 U/L (ref 10–44)
ANION GAP SERPL CALC-SCNC: 11 MMOL/L (ref 8–16)
AST SERPL-CCNC: 32 U/L (ref 10–40)
BASOPHILS # BLD AUTO: 0.01 K/UL (ref 0–0.2)
BASOPHILS NFR BLD: 0.1 % (ref 0–1.9)
BILIRUB SERPL-MCNC: 0.4 MG/DL (ref 0.1–1)
BUN SERPL-MCNC: 27 MG/DL (ref 8–23)
CALCIUM SERPL-MCNC: 8.7 MG/DL (ref 8.7–10.5)
CHLORIDE SERPL-SCNC: 106 MMOL/L (ref 95–110)
CO2 SERPL-SCNC: 24 MMOL/L (ref 23–29)
CREAT SERPL-MCNC: 0.8 MG/DL (ref 0.5–1.4)
DIFFERENTIAL METHOD: ABNORMAL
EOSINOPHIL # BLD AUTO: 0 K/UL (ref 0–0.5)
EOSINOPHIL NFR BLD: 0.1 % (ref 0–8)
ERYTHROCYTE [DISTWIDTH] IN BLOOD BY AUTOMATED COUNT: 13.6 % (ref 11.5–14.5)
EST. GFR  (AFRICAN AMERICAN): >60 ML/MIN/1.73 M^2
EST. GFR  (NON AFRICAN AMERICAN): >60 ML/MIN/1.73 M^2
GLUCOSE SERPL-MCNC: 114 MG/DL (ref 70–110)
HCT VFR BLD AUTO: 39.4 % (ref 40–54)
HGB BLD-MCNC: 12.9 G/DL (ref 14–18)
IMM GRANULOCYTES # BLD AUTO: 0.03 K/UL (ref 0–0.04)
IMM GRANULOCYTES NFR BLD AUTO: 0.4 % (ref 0–0.5)
LYMPHOCYTES # BLD AUTO: 1 K/UL (ref 1–4.8)
LYMPHOCYTES NFR BLD: 13.5 % (ref 18–48)
MAGNESIUM SERPL-MCNC: 2.1 MG/DL (ref 1.6–2.6)
MCH RBC QN AUTO: 27.1 PG (ref 27–31)
MCHC RBC AUTO-ENTMCNC: 32.7 G/DL (ref 32–36)
MCV RBC AUTO: 83 FL (ref 82–98)
MONOCYTES # BLD AUTO: 0.5 K/UL (ref 0.3–1)
MONOCYTES NFR BLD: 6.7 % (ref 4–15)
NEUTROPHILS # BLD AUTO: 5.7 K/UL (ref 1.8–7.7)
NEUTROPHILS NFR BLD: 79.2 % (ref 38–73)
NRBC BLD-RTO: 0 /100 WBC
PLATELET # BLD AUTO: 404 K/UL (ref 150–450)
PMV BLD AUTO: 10.1 FL (ref 9.2–12.9)
POCT GLUCOSE: 110 MG/DL (ref 70–110)
POCT GLUCOSE: 167 MG/DL (ref 70–110)
POCT GLUCOSE: 227 MG/DL (ref 70–110)
POCT GLUCOSE: 266 MG/DL (ref 70–110)
POTASSIUM SERPL-SCNC: 4.2 MMOL/L (ref 3.5–5.1)
PROT SERPL-MCNC: 6.2 G/DL (ref 6–8.4)
RBC # BLD AUTO: 4.76 M/UL (ref 4.6–6.2)
SODIUM SERPL-SCNC: 141 MMOL/L (ref 136–145)
WBC # BLD AUTO: 7.16 K/UL (ref 3.9–12.7)

## 2022-02-01 PROCEDURE — 27000207 HC ISOLATION

## 2022-02-01 PROCEDURE — 36415 COLL VENOUS BLD VENIPUNCTURE: CPT | Performed by: INTERNAL MEDICINE

## 2022-02-01 PROCEDURE — 25000003 PHARM REV CODE 250

## 2022-02-01 PROCEDURE — 11000001 HC ACUTE MED/SURG PRIVATE ROOM

## 2022-02-01 PROCEDURE — 94799 UNLISTED PULMONARY SVC/PX: CPT

## 2022-02-01 PROCEDURE — 94640 AIRWAY INHALATION TREATMENT: CPT

## 2022-02-01 PROCEDURE — 85025 COMPLETE CBC W/AUTO DIFF WBC: CPT | Performed by: INTERNAL MEDICINE

## 2022-02-01 PROCEDURE — 25000242 PHARM REV CODE 250 ALT 637 W/ HCPCS: Performed by: INTERNAL MEDICINE

## 2022-02-01 PROCEDURE — 63600175 PHARM REV CODE 636 W HCPCS

## 2022-02-01 PROCEDURE — 83735 ASSAY OF MAGNESIUM: CPT | Performed by: INTERNAL MEDICINE

## 2022-02-01 PROCEDURE — 63600175 PHARM REV CODE 636 W HCPCS: Performed by: INTERNAL MEDICINE

## 2022-02-01 PROCEDURE — 25000003 PHARM REV CODE 250: Performed by: INTERNAL MEDICINE

## 2022-02-01 PROCEDURE — 80053 COMPREHEN METABOLIC PANEL: CPT | Performed by: INTERNAL MEDICINE

## 2022-02-01 PROCEDURE — C9399 UNCLASSIFIED DRUGS OR BIOLOG: HCPCS | Performed by: INTERNAL MEDICINE

## 2022-02-01 PROCEDURE — 27100171 HC OXYGEN HIGH FLOW UP TO 24 HOURS

## 2022-02-01 RX ADMIN — DEXAMETHASONE 6 MG: 4 TABLET ORAL at 08:02

## 2022-02-01 RX ADMIN — OXYCODONE HYDROCHLORIDE AND ACETAMINOPHEN 500 MG: 500 TABLET ORAL at 08:02

## 2022-02-01 RX ADMIN — REMDESIVIR 100 MG: 100 INJECTION, POWDER, LYOPHILIZED, FOR SOLUTION INTRAVENOUS at 08:02

## 2022-02-01 RX ADMIN — PREDNISOLONE ACETATE 1 DROP: 10 SUSPENSION/ DROPS OPHTHALMIC at 08:02

## 2022-02-01 RX ADMIN — GUAIFENESIN AND DEXTROMETHORPHAN HYDROBROMIDE 1 TABLET: 600; 30 TABLET, EXTENDED RELEASE ORAL at 08:02

## 2022-02-01 RX ADMIN — ENOXAPARIN SODIUM 80 MG: 100 INJECTION SUBCUTANEOUS at 08:02

## 2022-02-01 RX ADMIN — PREDNISOLONE ACETATE 1 DROP: 10 SUSPENSION/ DROPS OPHTHALMIC at 12:02

## 2022-02-01 RX ADMIN — INSULIN ASPART 1 UNITS: 100 INJECTION, SOLUTION INTRAVENOUS; SUBCUTANEOUS at 08:02

## 2022-02-01 RX ADMIN — PANTOPRAZOLE SODIUM 40 MG: 40 TABLET, DELAYED RELEASE ORAL at 08:02

## 2022-02-01 RX ADMIN — FLUTICASONE PROPIONATE 100 MCG: 50 SPRAY, METERED NASAL at 08:02

## 2022-02-01 RX ADMIN — ALBUTEROL SULFATE 2 PUFF: 90 AEROSOL, METERED RESPIRATORY (INHALATION) at 01:02

## 2022-02-01 RX ADMIN — BENZONATATE 100 MG: 100 CAPSULE ORAL at 08:02

## 2022-02-01 RX ADMIN — ALBUTEROL SULFATE 2 PUFF: 90 AEROSOL, METERED RESPIRATORY (INHALATION) at 08:02

## 2022-02-01 RX ADMIN — BARICITINIB 2 MG: 2 TABLET, FILM COATED ORAL at 08:02

## 2022-02-01 RX ADMIN — PREDNISOLONE ACETATE 1 DROP: 10 SUSPENSION/ DROPS OPHTHALMIC at 04:02

## 2022-02-01 RX ADMIN — INSULIN ASPART 2 UNITS: 100 INJECTION, SOLUTION INTRAVENOUS; SUBCUTANEOUS at 04:02

## 2022-02-01 RX ADMIN — THERA TABS 1 TABLET: TAB at 08:02

## 2022-02-01 RX ADMIN — ALBUTEROL SULFATE 2 PUFF: 90 AEROSOL, METERED RESPIRATORY (INHALATION) at 07:02

## 2022-02-01 NOTE — CONSULTS
Thank you for your consult to [facility]. We have reviewed the patient chart. This patient does meet criteria for [facility] service at this time. Will assume care on 02/01/22 at 7AM.

## 2022-02-01 NOTE — SUBJECTIVE & OBJECTIVE
Interval History: O2 requirements improving. On 4 L nasal cannula  Plan for Home with home health  Shower chair    Day 5/5 on remdesivir, continue baricitinib, and dexamethasone  Continue therapeutic Lovenox      Review of Systems   Constitutional: Positive for fatigue. Negative for activity change, appetite change, chills, diaphoresis, fever and unexpected weight change.   Respiratory: Positive for cough and shortness of breath. Negative for chest tightness, wheezing and stridor.    Cardiovascular: Negative for chest pain, palpitations and leg swelling.   Gastrointestinal: Negative for abdominal pain, blood in stool, constipation, diarrhea, nausea and vomiting.   Endocrine: Negative for cold intolerance and heat intolerance.   Genitourinary: Negative for difficulty urinating, dysuria, flank pain and frequency.   Musculoskeletal: Negative for arthralgias, joint swelling and myalgias.   Skin: Negative.    Neurological: Negative for dizziness, weakness, light-headedness and headaches.     Objective:     Vital Signs (Most Recent):  Temp: 97.7 °F (36.5 °C) (02/01/22 0826)  Pulse: 68 (02/01/22 0841)  Resp: 18 (02/01/22 0826)  BP: (!) 127/59 (02/01/22 0826)  SpO2: (!) 93 % (02/01/22 0826) Vital Signs (24h Range):  Temp:  [96.2 °F (35.7 °C)-97.7 °F (36.5 °C)] 97.7 °F (36.5 °C)  Pulse:  [58-77] 68  Resp:  [16-20] 18  SpO2:  [92 %-97 %] 93 %  BP: (119-151)/(59-71) 127/59     Weight: 71.6 kg (157 lb 13.6 oz)  Body mass index is 25.48 kg/m².    Intake/Output Summary (Last 24 hours) at 2/1/2022 0929  Last data filed at 2/1/2022 0600  Gross per 24 hour   Intake 733 ml   Output 900 ml   Net -167 ml      Physical Exam  Constitutional:       General: He is not in acute distress.     Appearance: He is ill-appearing.   HENT:      Head: Normocephalic and atraumatic.   Eyes:      Conjunctiva/sclera: Conjunctivae normal.      Pupils: Pupils are equal, round, and reactive to light.   Cardiovascular:      Rate and Rhythm: Normal rate and  regular rhythm.      Heart sounds: Normal heart sounds.   Pulmonary:      Effort: Pulmonary effort is normal. No respiratory distress.      Breath sounds: Rales present.   Abdominal:      General: Bowel sounds are normal.      Palpations: Abdomen is soft.   Musculoskeletal:         General: Normal range of motion.      Cervical back: Normal range of motion and neck supple.   Skin:     General: Skin is warm and dry.   Neurological:      General: No focal deficit present.      Mental Status: He is alert and oriented to person, place, and time.         Significant Labs: All pertinent labs within the past 24 hours have been reviewed.    Significant Imaging: I have reviewed all pertinent imaging results/findings within the past 24 hours.

## 2022-02-01 NOTE — PLAN OF CARE
"   02/01/22 1209   Post-Acute Status   Post-Acute Authorization Home Health;HME   HME Status Pending therapy documentation  (Pt on 4L NC. Will need home O2 eval if oxygen needed for DC. Attending notified.)   Home Health Status Pending medical clearance/testing  (Accepted by [facility]. Pending HH orders on DC.)   Discharge Plan   Discharge Plan A Home;Home with family;Home Health     No future appointments.    /79 (BP Location: Right leg, Patient Position: Lying)   Pulse 69   Temp 96.7 °F (35.9 °C) (Oral)   Resp 16   Ht 5' 6" (1.676 m)   Wt 71.6 kg (157 lb 13.6 oz)   SpO2 99%   BMI 25.48 kg/m²      albuterol  2 puff Inhalation Q6H    ascorbic acid (vitamin C)  500 mg Oral BID    baricitinib  2 mg Oral Daily    dexAMETHasone  6 mg Oral Daily    dextromethorphan-guaiFENesin  mg  1 tablet Oral BID    enoxaparin  1 mg/kg (Dosing Weight) Subcutaneous BID    fluticasone propionate  2 spray Each Nostril Daily    multivitamin  1 tablet Oral Daily    pantoprazole  40 mg Oral Daily    prednisoLONE acetate  1 drop Both Eyes QID       "

## 2022-02-01 NOTE — PROGRESS NOTES
[facility] Medicine  Telemedicine Progress Note    Patient Name: Norman Saunders  MRN: [id number]  Patient Class: IP- Inpatient   Admission Date: 1/27/2022  Length of Stay: 5 days  Attending Physician: Holly Rivera*  Primary Care Provider: Darshana Brandon MD          Subjective:     Principal Problem:Acute respiratory failure due to COVID-19        HPI:  Norman Saunders is an 89 y/o female with PMHx DMII, GERD, Major Depression Disorder presents to [facility] ED with complaints of weakness, fever, body aches, and decreased appetite. Patient states he was recently seen at an urgent care for his symptoms one week ago and tested negative for COVID-19. He was discharged with doxycycline which he completed his course of therapy. Family reports patient's home oxygen saturation was 85% on room air. Patient denies chest pain, palpitations, or leg swelling. Patient denies N/V/D. In ED: Labs remarkable for CRP for 157.8. CXR demonstrated low lung volumes with bilateral interstitial and patchy airspace opacities concerning for multifocal infection, including viral or bacterial pneumonia. Blood and urine cultures pending. COVID-19 protocol initiated. Will admit to hospital medicine for further evaluation and treatment.         Overview/Hospital Course:  No notes on file    Interval History: O2 requirements improving. On 4 L nasal cannula  Plan for Home with home health  Shower chair    Day 5/5 on remdesivir, continue baricitinib, and dexamethasone  Continue therapeutic Lovenox      Review of Systems   Constitutional: Positive for fatigue. Negative for activity change, appetite change, chills, diaphoresis, fever and unexpected weight change.   Respiratory: Positive for cough and shortness of breath. Negative for chest tightness, wheezing and stridor.    Cardiovascular: Negative for chest pain, palpitations and leg swelling.   Gastrointestinal: Negative for abdominal pain, blood in stool, constipation,  diarrhea, nausea and vomiting.   Endocrine: Negative for cold intolerance and heat intolerance.   Genitourinary: Negative for difficulty urinating, dysuria, flank pain and frequency.   Musculoskeletal: Negative for arthralgias, joint swelling and myalgias.   Skin: Negative.    Neurological: Negative for dizziness, weakness, light-headedness and headaches.     Objective:     Vital Signs (Most Recent):  Temp: 97.7 °F (36.5 °C) (02/01/22 0826)  Pulse: 68 (02/01/22 0841)  Resp: 18 (02/01/22 0826)  BP: (!) 127/59 (02/01/22 0826)  SpO2: (!) 93 % (02/01/22 0826) Vital Signs (24h Range):  Temp:  [96.2 °F (35.7 °C)-97.7 °F (36.5 °C)] 97.7 °F (36.5 °C)  Pulse:  [58-77] 68  Resp:  [16-20] 18  SpO2:  [92 %-97 %] 93 %  BP: (119-151)/(59-71) 127/59     Weight: 71.6 kg (157 lb 13.6 oz)  Body mass index is 25.48 kg/m².    Intake/Output Summary (Last 24 hours) at 2/1/2022 0929  Last data filed at 2/1/2022 0600  Gross per 24 hour   Intake 733 ml   Output 900 ml   Net -167 ml      Physical Exam  Constitutional:       General: He is not in acute distress.     Appearance: He is ill-appearing.   HENT:      Head: Normocephalic and atraumatic.   Eyes:      Conjunctiva/sclera: Conjunctivae normal.      Pupils: Pupils are equal, round, and reactive to light.   Cardiovascular:      Rate and Rhythm: Normal rate and regular rhythm.      Heart sounds: Normal heart sounds.   Pulmonary:      Effort: Pulmonary effort is normal. No respiratory distress.      Breath sounds: Rales present.   Abdominal:      General: Bowel sounds are normal.      Palpations: Abdomen is soft.   Musculoskeletal:         General: Normal range of motion.      Cervical back: Normal range of motion and neck supple.   Skin:     General: Skin is warm and dry.   Neurological:      General: No focal deficit present.      Mental Status: He is alert and oriented to person, place, and time.         Significant Labs: All pertinent labs within the past 24 hours have been  reviewed.    Significant Imaging: I have reviewed all pertinent imaging results/findings within the past 24 hours.      Assessment/Plan:      * Acute respiratory failure due to COVID-19  Patient with Hypoxic respiratory failure which is Acute which is not related to a recent procedure.  he is not on home oxygen. Supplemental oxygen was provided and noted- Nasal Cannula 4 LPM  Signs/symptoms of respiratory failure include- tachypnea, increased work of breathing, respiratory distress and use of accessory muscles. Contributing diagnoses includes - Pneumonia and covid-19 infection Labs and images were reviewed. Patient Has not has a recent ABG.   Will treat underlying causes and adjust management of respiratory failure as follows- Continue supplemental oxygen, albuterol INH for SOB/wheezing                COVID-19 Multifocal pneumonia  - COVID positive 1/27/22; initiated on protocol  - Isolation: Airborne/Droplet. Surgical mask on patient. Notify Infection Control  - CXR with low lung volumes with bilateral interstitial and patchy airspace opacities concerning for multifocal infection, including viral or bacterial pneumonia., requiring O2  - Monitor on Telemetry  - initiated on dexamethasone, will continue for 10d course  - initiated on remdesivir x5d; daily CMP  -Patient received Levofloxacin IV x1  - initiated on ceftriaxone/azith x5d due to concern for superimposed CAP  - .8; D-dimer pending; procalcitonin pending  - Order RT consult via Respiratory Communication for COVID Protocols  - Incentive Spirometer Q4h  - Continuous Pulse Oximetry, goal SpO2 92-96%  - supplemental O2 PRN, will wean as tolerated  - Albuterol INH Q6h scheduled & PRN   - MVI & ascorbic acid 500mg PO BID  -Anti-tussives for cough  - Acetaminophen Q6hr PRN fever/headache  - Loperamide PRN viral diarrhea  - VTE PPx: enoxaparin   - PT/OT for debility/weakness  - If deterioration, may warrant trial of NIPPV in neg pressure room or immediate  ICU consult  Low suspicion for superimposed bacterial infection, discontinue abx  Cont decadron/remdesivir  Cont baricitinib  Wean off supp O2 as tolerated  Keep neg fluid balance    Major depressive disorder  Stable  Reports does not take celexa anymore  Will continue to monitor      Type 2 diabetes mellitus with hyperglycemia, without long-term current use of insulin  Hemoglobin A1C   Date Value Ref Range Status   08/20/2021 6.6 (H) 4.0 - 5.6 % Final     Comment:     ADA Screening Guidelines:  5.7-6.4%  Consistent with prediabetes  >or=6.5%  Consistent with diabetes    High levels of fetal hemoglobin interfere with the HbA1C  assay. Heterozygous hemoglobin variants (HbS, HgC, etc)do  not significantly interfere with this assay.   However, presence of multiple variants may affect accuracy.                       Patient's FSGs are controlled on current medication regimen.  - home regimen includes metformin  - hold home oral medications  -CBG goal 140-180  - LDSSI with ACCUcheck ACHS  - Diabetic diet  -Hypoglycemia protocol PRN        GERD (gastroesophageal reflux disease)  Pantoprazole 40mg PO daily        VTE Risk Mitigation (From admission, onward)         Ordered     enoxaparin injection 80 mg  2 times daily         01/27/22 2254     IP VTE HIGH RISK PATIENT  Once         01/27/22 2254     Place sequential compression device  Until discontinued         01/27/22 2254                      I have assessed these finding virtually using telemed platform and with assistance of bedside nurse                 The attending portion of this evaluation, treatment, and documentation was performed per Holly Rivera MD via Telemedicine AudioVisual using the secure [employer] ([employer]) software platform with 2 way audio/video. The provider was located off-site and the patient is located in the hospital. The aforementioned video software was utilized to document the relevant history and physical exam    Holly Rivera  MD  Department of [facility] Medicine   [facility]

## 2022-02-02 ENCOUNTER — PATIENT MESSAGE (OUTPATIENT)
Dept: ADMINISTRATIVE | Facility: CLINIC | Age: 87
End: 2022-02-02
Payer: COMMERCIAL

## 2022-02-02 VITALS
SYSTOLIC BLOOD PRESSURE: 126 MMHG | BODY MASS INDEX: 25.37 KG/M2 | OXYGEN SATURATION: 93 % | HEART RATE: 65 BPM | HEIGHT: 66 IN | TEMPERATURE: 98 F | DIASTOLIC BLOOD PRESSURE: 62 MMHG | RESPIRATION RATE: 18 BRPM | WEIGHT: 157.88 LBS

## 2022-02-02 LAB
BACTERIA BLD CULT: NORMAL
BACTERIA BLD CULT: NORMAL
POCT GLUCOSE: 111 MG/DL (ref 70–110)
POCT GLUCOSE: 133 MG/DL (ref 70–110)

## 2022-02-02 PROCEDURE — C9399 UNCLASSIFIED DRUGS OR BIOLOG: HCPCS | Performed by: INTERNAL MEDICINE

## 2022-02-02 PROCEDURE — 63600175 PHARM REV CODE 636 W HCPCS

## 2022-02-02 PROCEDURE — 90472 IMMUNIZATION ADMIN EACH ADD: CPT | Performed by: INTERNAL MEDICINE

## 2022-02-02 PROCEDURE — 90694 VACC AIIV4 NO PRSRV 0.5ML IM: CPT | Performed by: INTERNAL MEDICINE

## 2022-02-02 PROCEDURE — G0009 ADMIN PNEUMOCOCCAL VACCINE: HCPCS | Performed by: FAMILY MEDICINE

## 2022-02-02 PROCEDURE — 63600175 PHARM REV CODE 636 W HCPCS: Performed by: INTERNAL MEDICINE

## 2022-02-02 PROCEDURE — 99900035 HC TECH TIME PER 15 MIN (STAT)

## 2022-02-02 PROCEDURE — 94640 AIRWAY INHALATION TREATMENT: CPT

## 2022-02-02 PROCEDURE — 94761 N-INVAS EAR/PLS OXIMETRY MLT: CPT

## 2022-02-02 PROCEDURE — 25000003 PHARM REV CODE 250: Performed by: INTERNAL MEDICINE

## 2022-02-02 PROCEDURE — 63600175 PHARM REV CODE 636 W HCPCS: Performed by: FAMILY MEDICINE

## 2022-02-02 PROCEDURE — 90670 PCV13 VACCINE IM: CPT | Performed by: FAMILY MEDICINE

## 2022-02-02 PROCEDURE — 25000003 PHARM REV CODE 250

## 2022-02-02 PROCEDURE — 27000221 HC OXYGEN, UP TO 24 HOURS

## 2022-02-02 PROCEDURE — 90471 IMMUNIZATION ADMIN: CPT | Performed by: FAMILY MEDICINE

## 2022-02-02 PROCEDURE — 97116 GAIT TRAINING THERAPY: CPT | Mod: CQ

## 2022-02-02 PROCEDURE — G0008 ADMIN INFLUENZA VIRUS VAC: HCPCS | Performed by: INTERNAL MEDICINE

## 2022-02-02 PROCEDURE — 25000242 PHARM REV CODE 250 ALT 637 W/ HCPCS: Performed by: INTERNAL MEDICINE

## 2022-02-02 PROCEDURE — 97530 THERAPEUTIC ACTIVITIES: CPT | Mod: CQ

## 2022-02-02 RX ORDER — ASCORBIC ACID 500 MG
500 TABLET ORAL 2 TIMES DAILY
Qty: 30 TABLET | Refills: 0 | Status: SHIPPED | OUTPATIENT
Start: 2022-02-02 | End: 2023-05-12

## 2022-02-02 RX ORDER — DEXAMETHASONE 6 MG/1
6 TABLET ORAL DAILY
Qty: 3 TABLET | Refills: 0 | Status: SHIPPED | OUTPATIENT
Start: 2022-02-03 | End: 2022-02-06

## 2022-02-02 RX ORDER — BENZONATATE 100 MG/1
100 CAPSULE ORAL 3 TIMES DAILY PRN
Qty: 30 CAPSULE | Refills: 0 | Status: SHIPPED | OUTPATIENT
Start: 2022-02-02 | End: 2022-02-11

## 2022-02-02 RX ORDER — ALBUTEROL SULFATE 90 UG/1
2 AEROSOL, METERED RESPIRATORY (INHALATION) EVERY 6 HOURS
Qty: 8.5 G | Refills: 0 | Status: SHIPPED | OUTPATIENT
Start: 2022-02-02 | End: 2022-02-11

## 2022-02-02 RX ADMIN — PANTOPRAZOLE SODIUM 40 MG: 40 TABLET, DELAYED RELEASE ORAL at 09:02

## 2022-02-02 RX ADMIN — INFLUENZA VACCINE, ADJUVANTED 0.5 ML: 15; 15; 15; 15 INJECTION, SUSPENSION INTRAMUSCULAR at 10:02

## 2022-02-02 RX ADMIN — BARICITINIB 2 MG: 2 TABLET, FILM COATED ORAL at 09:02

## 2022-02-02 RX ADMIN — OXYCODONE HYDROCHLORIDE AND ACETAMINOPHEN 500 MG: 500 TABLET ORAL at 09:02

## 2022-02-02 RX ADMIN — ENOXAPARIN SODIUM 80 MG: 100 INJECTION SUBCUTANEOUS at 09:02

## 2022-02-02 RX ADMIN — FLUTICASONE PROPIONATE 100 MCG: 50 SPRAY, METERED NASAL at 09:02

## 2022-02-02 RX ADMIN — DEXAMETHASONE 6 MG: 4 TABLET ORAL at 09:02

## 2022-02-02 RX ADMIN — ALBUTEROL SULFATE 2 PUFF: 90 AEROSOL, METERED RESPIRATORY (INHALATION) at 01:02

## 2022-02-02 RX ADMIN — PREDNISOLONE ACETATE 1 DROP: 10 SUSPENSION/ DROPS OPHTHALMIC at 01:02

## 2022-02-02 RX ADMIN — PNEUMOCOCCAL 13-VALENT CONJUGATE VACCINE 0.5 ML: 2.2; 2.2; 2.2; 2.2; 2.2; 4.4; 2.2; 2.2; 2.2; 2.2; 2.2; 2.2; 2.2 INJECTION, SUSPENSION INTRAMUSCULAR at 10:02

## 2022-02-02 RX ADMIN — ALBUTEROL SULFATE 2 PUFF: 90 AEROSOL, METERED RESPIRATORY (INHALATION) at 07:02

## 2022-02-02 RX ADMIN — THERA TABS 1 TABLET: TAB at 09:02

## 2022-02-02 RX ADMIN — GUAIFENESIN AND DEXTROMETHORPHAN HYDROBROMIDE 1 TABLET: 600; 30 TABLET, EXTENDED RELEASE ORAL at 09:02

## 2022-02-02 RX ADMIN — PREDNISOLONE ACETATE 1 DROP: 10 SUSPENSION/ DROPS OPHTHALMIC at 09:02

## 2022-02-02 NOTE — PLAN OF CARE
[facility] - Telemetry      HOME HEALTH ORDERS  FACE TO FACE ENCOUNTER    Patient Name: Norman Saunders  YOB: 1932    PCP: Darshana Brandon MD   PCP Address: [address] / [place] [address]  PCP Phone Number: [phone]  PCP Fax: [phone]    Encounter Date: 1/27/22    Admit to Home Health    Diagnoses:  Active Hospital Problems    Diagnosis  POA    *Acute respiratory failure due to COVID-19 [U07.1, J96.00]  Yes    COVID-19 Multifocal pneumonia [J18.9]  Yes    Major depressive disorder [F32.9]  Yes    Type 2 diabetes mellitus with hyperglycemia, without long-term current use of insulin [E11.65]  Yes    GERD (gastroesophageal reflux disease) [K21.9]  Yes      Resolved Hospital Problems   No resolved problems to display.       Follow Up Appointments:  Future Appointments   Date Time Provider Department Center   2/7/2022 11:00 AM Darshana Brandon MD [facility]       Allergies:Review of patient's allergies indicates:  No Known Allergies    Medications: Review discharge medications with patient and family and provide education.    Current Facility-Administered Medications   Medication Dose Route Frequency Provider Last Rate Last Admin    acetaminophen tablet 650 mg  650 mg Oral Q4H PRN Jory TAri Jacobsen NP        albuterol inhaler 2 puff  2 puff Inhalation Q6H Jory TAri Jacobsen NP   2 puff at 02/02/22 0739    aluminum-magnesium hydroxide-simethicone 200-200-20 mg/5 mL suspension 30 mL  30 mL Oral QID PRN Jory TAri Jacobsen NP        ascorbic acid (vitamin C) tablet 500 mg  500 mg Oral BID Jory T. Billy-Kiran, NP   500 mg at 02/02/22 0934    baricitinib (OLUMIANT) tablet 2 mg  2 mg Oral Daily Maksim Valentine MD   2 mg at 02/02/22 0934    benzonatate capsule 100 mg  100 mg Oral TID PRN Jory T. Billy-Kiran, NP   100 mg at 02/01/22 2045    dexAMETHasone tablet 6 mg  6 mg Oral Daily Jory T. Billy-Kiran, NP   6 mg at 02/02/22 0934    dextromethorphan-guaiFENesin   mg per 12 hr tablet 1 tablet  1 tablet Oral BID Maksim Valentine MD   1 tablet at 02/02/22 0934    dextrose 10% bolus 125 mL  12.5 g Intravenous PRN Florencio Arevalo MD        dextrose 10% bolus 250 mL  25 g Intravenous PRN Florencio Arevalo MD        enoxaparin injection 80 mg  1 mg/kg (Dosing Weight) Subcutaneous BID Jory T. Billy-Kiran, NP   80 mg at 02/02/22 0934    fluticasone propionate 50 mcg/actuation nasal spray 100 mcg  2 spray Each Nostril Daily Maksim Valentine MD   100 mcg at 02/02/22 0934    glucagon (human recombinant) injection 1 mg  1 mg Intramuscular PRN Jory T. Billy-Kiran, NP        glucose chewable tablet 16 g  16 g Oral PRN Jory T. Billy-Kiran, NP        glucose chewable tablet 24 g  24 g Oral PRN Jory T. Billy-Kiran, NP        influenza (QUADRIVALENT ADJUVANTED PF) vaccine 0.5 mL  0.5 mL Intramuscular vaccine x 1 dose Maksim Valentine MD        insulin aspart U-100 pen 0-5 Units  0-5 Units Subcutaneous QID (AC + HS) PRN Jory T. Billy-Kiran, NP   1 Units at 02/01/22 2045    loperamide capsule 2 mg  2 mg Oral Q6H PRN Jory T. Billy-Kiran, NP        melatonin tablet 6 mg  6 mg Oral Nightly PRN Jory T. Billy-Kiran, NP        multivitamin tablet  1 tablet Oral Daily Jory T. Billy-Kiran, NP   1 tablet at 02/02/22 0934    ondansetron injection 4 mg  4 mg Intravenous Q8H PRN Jory T. Billy-Kirna, NP        pantoprazole EC tablet 40 mg  40 mg Oral Daily Jory T. Billy-Kiran, NP   40 mg at 02/02/22 0934    pneumoc 13-sam conj-dip cr(PF) (PREVNAR 13 (PF)) 0.5 mL  0.5 mL Intramuscular vaccine x 1 dose Holly Rivera MD        prednisoLONE acetate 1 % ophthalmic suspension 1 drop  1 drop Both Eyes QID Maksim Valentine MD   1 drop at 02/02/22 0934    simethicone chewable tablet 80 mg  1 tablet Oral QID PRN Jory T. Billy-Kiran, NP         Current Discharge Medication List      START taking these medications    Details   albuterol (PROVENTIL/VENTOLIN HFA) 90  mcg/actuation inhaler Inhale 2 puffs into the lungs every 6 (six) hours. Rescue  Qty: 6.7 g, Refills: 0      ascorbic acid, vitamin C, (VITAMIN C) 500 MG tablet Take 1 tablet (500 mg total) by mouth 2 (two) times daily.  Qty: 30 tablet, Refills: 0      benzonatate (TESSALON) 100 MG capsule Take 1 capsule (100 mg total) by mouth 3 (three) times daily as needed for Cough.  Qty: 30 capsule, Refills: 0      dexAMETHasone (DECADRON) 6 MG tablet Take 1 tablet (6 mg total) by mouth once daily. for 3 days  Qty: 3 tablet, Refills: 0      pulse oximeter (PULSE OXIMETER) device by Apply Externally route 2 (two) times a day. Use twice daily at 8 AM and 3 PM and record the value in [facility] as directed.  Qty: 1 each, Refills: 0    Comments: This is a NO CHARGE item.  Please override price to zero.  DO NOT PRINT.  NORMAL MODE e-PRESCRIBE ONLY.         CONTINUE these medications which have NOT CHANGED    Details   citalopram (CELEXA) 10 MG tablet Take 1 tablet (10 mg total) by mouth once daily.  Qty: 30 tablet, Refills: 11    Associated Diagnoses: Depression, unspecified depression type; Trigeminal neuralgia      gabapentin (NEURONTIN) 800 MG tablet Take 0.5 tablets (400 mg total) by mouth 2 (two) times daily.  Qty: 180 tablet, Refills: 3    Comments: DX Code Needed  .  Associated Diagnoses: Trigeminal neuralgia      meclizine (ANTIVERT) 12.5 mg tablet Take 1 tablet (12.5 mg total) by mouth 2 (two) times daily as needed for Dizziness.  Qty: 60 tablet, Refills: 6      metFORMIN (GLUCOPHAGE-XR) 500 MG ER 24hr tablet Take 1 tablet (500 mg total) by mouth daily with breakfast.  Qty: 90 tablet, Refills: 3    Associated Diagnoses: Type 2 diabetes mellitus with hyperosmolarity without coma, without long-term current use of insulin      omeprazole (PRILOSEC) 20 MG capsule Take 1 capsule (20 mg total) by mouth once daily. Para el reflujo.  Qty: 90 capsule, Refills: 3    Associated Diagnoses: Gastroesophageal reflux disease with esophagitis  without hemorrhage      clotrimazole (LOTRIMIN) 1 % Soln Apply 4 drops to affected ear twice daily  Qty: 10 mL, Refills: 2    Associated Diagnoses: Chronic otitis externa, unspecified laterality, unspecified type      diclofenac sodium (VOLTAREN) 1 % Gel Apply 2 g topically once daily.  Qty: 100 g, Refills: 1      fluticasone propionate (FLONASE) 50 mcg/actuation nasal spray 2 SPRAYS (100 MCG TOTAL) BY EACH NOSTRIL ROUTE ONCE DAILY.  Qty: 48 mL, Refills: 1    Associated Diagnoses: Gastroesophageal reflux disease with esophagitis without hemorrhage      prednisoLONE acetate (PRED FORTE) 1 % DrpS Place 1 drop into both eyes 4 (four) times daily.  Qty: 1 Bottle, Refills: 0         STOP taking these medications       doxycycline (VIBRA-TABS) 100 MG tablet Comments:   Reason for Stopping:         meloxicam (MOBIC) 7.5 MG tablet Comments:   Reason for Stopping:                 I have seen and examined this patient within the last 30 days. My clinical findings that support the need for the home health skilled services and home bound status are the following:no   Weakness/numbness causing balance and gait disturbance due to Infection and Weakness/Debility making it taxing to leave home.     Diet:   regular diet    Supplemental oxygen:  2 L nasal cannula    Referrals/ Consults  Physical Therapy to evaluate and treat. Evaluate for home safety and equipment needs; Establish/upgrade home exercise program. Perform / instruct on therapeutic exercises, gait training, transfer training, and Range of Motion.  Occupational Therapy to evaluate and treat. Evaluate home environment for safety and equipment needs. Perform/Instruct on transfers, ADL training, ROM, and therapeutic exercises.    Activities:   activity as tolerated    Nursing:   Agency to admit patient within 24 hours of hospital discharge unless specified on physician order or at patient request    SN to complete comprehensive assessment including routine vital signs.  Instruct on disease process and s/s of complications to report to MD. Review/verify medication list sent home with the patient at time of discharge  and instruct patient/caregiver as needed. Frequency may be adjusted depending on start of care date.     Skilled nurse to perform up to 3 visits PRN for symptoms related to diagnosis    Notify MD if SBP > 160 or < 90; DBP > 90 or < 50; HR > 120 or < 50; Temp > 101; O2 < 88%; Other:       Ok to schedule additional visits based on staff availability and patient request on consecutive days within the home health episode.    When multiple disciplines ordered:    Start of Care occurs on Sunday - Wednesday schedule remaining discipline evaluations as ordered on separate consecutive days following the start of care.    Thursday SOC -schedule subsequent evaluations Friday and Monday the following week.     Friday - Saturday SOC - schedule subsequent discipline evaluations on consecutive days starting Monday of the following week.    For all post-discharge communication and subsequent orders please contact patient's primary care physician. If unable to reach primary care physician or do not receive response within 30 minutes, please contact 2 for clinical staff order clarification    Miscellaneous   Home Oxygen:  Oxygen at 2 L/min nasal canula to be used:  Continuously.    Home Health Aide:  Nursing Three times weekly, Physical Therapy Three times weekly and Occupational Therapy Three times weekly      I certify that this patient is confined to his home and needs intermittent skilled nursing care, physical therapy and occupational therapy.

## 2022-02-02 NOTE — ASSESSMENT & PLAN NOTE
- COVID positive 1/27/22; initiated on protocol  - Isolation: Airborne/Droplet. Surgical mask on patient. Notify Infection Control  - CXR with low lung volumes with bilateral interstitial and patchy airspace opacities concerning for multifocal infection, including viral or bacterial pneumonia., requiring O2  - Monitor on Telemetry  -  continue on dexamethasone, will continue for 10d course  - continue on remdesivir x5d; daily CMP  -Patient received Levofloxacin IV x1  - initiated on ceftriaxone/azith x5d due to concern for superimposed CAP  - .8;   - Order RT consult via Respiratory Communication for COVID Protocols  - Incentive Spirometer Q4h  - Continuous Pulse Oximetry, goal SpO2 92-96%  - supplemental O2 PRN, will wean as tolerated  - Albuterol INH Q6h scheduled & PRN   - MVI & ascorbic acid 500mg PO BID  -Anti-tussives for cough  - Acetaminophen Q6hr PRN fever/headache  - Loperamide PRN viral diarrhea  - VTE PPx: enoxaparin   - PT/OT for debility/weakness  - If deterioration, may warrant trial of NIPPV in neg pressure room or immediate ICU consult  Low suspicion for superimposed bacterial infection, discontinue abx  Cont decadron/remdesivir  Cont baricitinib  Wean off supp O2 as tolerated  Keep neg fluid balance  Discharge on home oxygen

## 2022-02-02 NOTE — PLAN OF CARE
"   02/02/22 1207   Post-Acute Status   Post-Acute Authorization HME   E Status Set-up Complete/Auth obtained  (Home oxygen (1 etank) delivered to bedside nurse Marlen. Delivery ticket signed.)     Future Appointments   Date Time Provider Department Center   2/7/2022 11:00 AM Darshana Brandon MD [facility]     /62 (BP Location: Right arm, Patient Position: Lying)   Pulse 65   Temp 97.7 °F (36.5 °C) (Oral)   Resp 18   Ht 5' 6" (1.676 m)   Wt 71.6 kg (157 lb 13.6 oz)   SpO2 (!) 93%   BMI 25.48 kg/m²        Medication List        START taking these medications      albuterol 90 mcg/actuation inhaler  Commonly known as: PROVENTIL/VENTOLIN HFA  Inhale 2 aspiraciones para los pulmones cada 6 (seis) horas. Rescate  (Inhale 2 puffs into the lungs every 6 (six) hours. Rescue)     ascorbic acid (vitamin C) 500 MG tablet  Commonly known as: VITAMIN C  Annada krzysztof tableta (500 mg en total) por vía oral 2 veces al día.  (Take 1 tablet (500 mg total) by mouth 2 (two) times daily.)     benzonatate 100 MG capsule  Commonly known as: TESSALON  Annada 1 cápsula (100 mg en total) por vía oral 3 (nila) veces al día según sea necesario para la tos.  (Take 1 capsule (100 mg total) by mouth 3 (three) times daily as needed for Cough.)     dexAMETHasone 6 MG tablet  Commonly known as: DECADRON  Take 1 tablet (6 mg total) by mouth once daily. for 3 days  Start taking on: February 3, 2022     pulse oximeter device  Commonly known as: pulse oximeter  aplicar por vía externa 2 (dos) veces al día. Úselo dos veces al día, a las 8 AM y a las 3 PM, y registre el valor en MyChart según las indicaciones.  (by Apply Externally route 2 (two) times a day. Use twice daily at 8 AM and 3 PM and record the value in MyChart as directed.)            CONTINUE taking these medications      citalopram 10 MG tablet  Commonly known as: CeleXA  Take 1 tablet (10 mg total) by mouth once daily.     clotrimazole 1 % Soln  Commonly known as: " LOTRIMIN  Apply 4 drops to affected ear twice daily     diclofenac sodium 1 % Gel  Commonly known as: VOLTAREN  Apply 2 g topically once daily.     fluticasone propionate 50 mcg/actuation nasal spray  Commonly known as: FLONASE  2 SPRAYS (100 MCG TOTAL) BY EACH NOSTRIL ROUTE ONCE DAILY.     gabapentin 800 MG tablet  Commonly known as: NEURONTIN  Take 0.5 tablets (400 mg total) by mouth 2 (two) times daily.     meclizine 12.5 mg tablet  Commonly known as: ANTIVERT  Take 1 tablet (12.5 mg total) by mouth 2 (two) times daily as needed for Dizziness.     metFORMIN 500 MG ER 24hr tablet  Commonly known as: GLUCOPHAGE-XR  Take 1 tablet (500 mg total) by mouth daily with breakfast.     omeprazole 20 MG capsule  Commonly known as: PRILOSEC  Take 1 capsule (20 mg total) by mouth once daily. Para el reflujo.     prednisoLONE acetate 1 % Drps  Commonly known as: PRED FORTE  Place 1 drop into both eyes 4 (four) times daily.            STOP taking these medications      doxycycline 100 MG tablet  Commonly known as: VIBRA-TABS     meloxicam 7.5 MG tablet  Commonly known as: MOBIC               Where to Get Your Medications        These medications were sent to [facility] [address], [place] [address]      Hours: Mon-Fri, 8a-5:30p Phone: [phone]   albuterol 90 mcg/actuation inhaler  ascorbic acid (vitamin C) 500 MG tablet  benzonatate 100 MG capsule  dexAMETHasone 6 MG tablet  pulse oximeter device

## 2022-02-02 NOTE — PLAN OF CARE
Discharge orders noted. AVS prepared with medication list, importance of medication compliance, follow up appointments, diet, home care instructions, treatment plan, self management, and when to seek medical attention. Detailed clinical reference list attached. Instructed bedside nurse to print and review AVS with patient once  signs off.

## 2022-02-02 NOTE — NURSING
Patient has been discharged from the facility. IV and telemetry box to be removed prior to departure. AVS to be printed and delivered by the bedside nurse and then reviewed by the virtual nurse.

## 2022-02-02 NOTE — NURSING
Home Oxygen Evaluation    Performed by Gina with Physical Therapy:    Date Performed: 2022    1) Patient's Home O2 Sat on room air, while at rest: 91%        If O2 sats on room air at rest are 88% or below, patient qualifies. No additional testing needed. Document N/A in steps 2 and 3. If 89% or above, complete steps 2.      2) Patient's O2 Sat on room air while exercisin%        If O2 sats on room air while exercising remain 89% or above patient does not qualify, no further testing needed Document N/A in step 3. If O2 sats on room air while exercising are 88% or below, continue to step 3.      3) Patient's O2 Sat while exercising on O2: 2 LPM, patient recovered to 89% - 91%          (Must show improvement from #2 for patients to qualify)    If O2 sats improve on oxygen, patient qualifies for portable oxygen. If not, the patient does not qualify.

## 2022-02-02 NOTE — PT/OT/SLP PROGRESS
Physical Therapy Treatment    Patient Name:  Norman Saunders   MRN:  [id number]    Recommendations:     Discharge Recommendations:  home health PT   Discharge Equipment Recommendations: shower chair   Barriers to discharge: None    Assessment:     Norman Saunders is a 89 y.o. male admitted with a medical diagnosis of Acute respiratory failure due to COVID-19.  He presents with the following impairments/functional limitations:  weakness,impaired endurance,impaired self care skills,impaired functional mobilty,gait instability,impaired balance,decreased coordination,decreased upper extremity function,decreased lower extremity function,decreased safety awareness,decreased ROM,impaired cardiopulmonary response to activity. Pt able to perform 3 ambulation trials without AD requiring min A. 1st 2 trials without O2 donned with SpO2 levels fluctuating between 78-82%. 3rd trial with 2L of O2 donned per Dr. Chinchilla with SpO2 levels fluctuating between 89-91%. Recommending home health PT upon discharge.    Rehab Prognosis: Good; patient would benefit from acute skilled PT services to address these deficits and reach maximum level of function.    Recent Surgery: * No surgery found *      Plan:     During this hospitalization, patient to be seen 2 x/week to address the identified rehab impairments via gait training,therapeutic activities,therapeutic exercises,neuromuscular re-education and progress toward the following goals:    · Plan of Care Expires:  02/28/22    Subjective     Chief Complaint: None Expressed  Patient/Family Comments/goals: None Expressed  Pain/Comfort:  · Pain Rating 1: 0/10  · Pain Rating Post-Intervention 1: 0/10      Objective:     Communicated with nurse prior to session.  Patient found supine with bed alarm,oxygen,pulse ox (continuous),telemetry upon PT entry to room.     General Precautions: Standard, airborne,contact,droplet,fall   Orthopedic Precautions:N/A   Braces: N/A  Respiratory Status: Nasal  cannula, flow  2 L/min     Functional Mobility:  · Bed Mobility:     · Rolling Right: supervision  · Scooting: supervision  · Supine to Sit: contact guard assistance  · Sit to Supine: contact guard assistance  · Transfers:     · Sit to Stand:  stand by assistance and contact guard assistance with hand-held assist  · Gait:  3 trials ~25-30 ft each without AD requiring min A. 1st 2 trials without O2 donned and 3rd trial with 2L of O2 per Dr. Chinchilla      AM-PAC 6 CLICK MOBILITY  Turning over in bed (including adjusting bedclothes, sheets and blankets)?: 4  Sitting down on and standing up from a chair with arms (e.g., wheelchair, bedside commode, etc.): 3  Moving from lying on back to sitting on the side of the bed?: 3  Moving to and from a bed to a chair (including a wheelchair)?: 3  Need to walk in hospital room?: 3  Climbing 3-5 steps with a railing?: 3  Basic Mobility Total Score: 19       Therapeutic Activities and Exercises:    used Manuel #[id number] son also in room. Nurse requested to ambulate pt without O2 donned to get SpO2 levels. Upon entering room Dr. Chinchilla on video chat staying in room throughout session. Pt able to ambulate by 3 trials ~25-30 ft each without AD requiring min A as pt occasionally holding onto wall to steady self. 1st 2 trials without O2 donned with SpO2 levels fluctuating between 78-82%. 3rd trial with 2L of O2 per Dr. Chinchilla and SpO2 levels fluctuating between 89-91%.     Patient left supine with all lines intact, call button in reach, bed alarm on,  nurse notified and  son present..    GOALS:   Multidisciplinary Problems     Physical Therapy Goals        Problem: Physical Therapy Goal    Goal Priority Disciplines Outcome Goal Variances Interventions   Physical Therapy Goal     PT, PT/OT Ongoing, Progressing     Description: Goals to be met by: 22     Patient will increase functional independence with mobility by performin. Supine <> sit with Stand-by  Assistance  2. Sit to stand transfer with Stand-by Assistance  3. Bed to chair transfer with Stand-by Assistance using Rolling Walker  4. Gait  x 50 feet with Stand-by Assistance using Rolling Walker.   5. Lower extremity exercise program x 10 reps per handout, with supervision                     Time Tracking:     PT Received On: 02/02/22  PT Start Time: 1004     PT Stop Time: 1038  PT Total Time (min): 34 min     Billable Minutes: Gait Training  20 and Therapeutic Activity  14    Treatment Type: Treatment  PT/PTA: PTA     PTA Visit Number: 1     02/02/2022

## 2022-02-02 NOTE — SUBJECTIVE & OBJECTIVE
Interval History: O2 requirements improving. On 4 L nasal cannula- ambulatory pulse ox  Plan for Home with home health, need shower chair    continue baricitinib, and dexamethasone  Possible discharge today      Review of Systems   Constitutional: Positive for fatigue. Negative for activity change, appetite change, chills, diaphoresis, fever and unexpected weight change.   Respiratory: Positive for cough. Negative for chest tightness, shortness of breath, wheezing and stridor.    Cardiovascular: Negative for chest pain, palpitations and leg swelling.   Gastrointestinal: Negative for abdominal pain, blood in stool, constipation, diarrhea, nausea and vomiting.   Endocrine: Negative for cold intolerance and heat intolerance.   Genitourinary: Negative for difficulty urinating, dysuria, flank pain and frequency.   Musculoskeletal: Negative for arthralgias, joint swelling and myalgias.   Skin: Negative.    Neurological: Negative for dizziness, weakness, light-headedness and headaches.     Objective:     Vital Signs (Most Recent):  Temp: 96.9 °F (36.1 °C) (02/02/22 0715)  Pulse: 68 (02/02/22 0800)  Resp: 18 (02/02/22 0739)  BP: 124/67 (02/02/22 0715)  SpO2: 97 % (02/02/22 0739) Vital Signs (24h Range):  Temp:  [96.1 °F (35.6 °C)-97.5 °F (36.4 °C)] 96.9 °F (36.1 °C)  Pulse:  [53-72] 68  Resp:  [16-18] 18  SpO2:  [93 %-99 %] 97 %  BP: (124-155)/(65-79) 124/67     Weight: 71.6 kg (157 lb 13.6 oz)  Body mass index is 25.48 kg/m².    Intake/Output Summary (Last 24 hours) at 2/2/2022 0839  Last data filed at 2/1/2022 1700  Gross per 24 hour   Intake 490 ml   Output 500 ml   Net -10 ml      Physical Exam  Constitutional:       General: He is not in acute distress.     Appearance: He is not ill-appearing.   HENT:      Head: Normocephalic and atraumatic.   Cardiovascular:      Rate and Rhythm: Normal rate and regular rhythm.      Heart sounds: Normal heart sounds.   Pulmonary:      Effort: Pulmonary effort is normal. No respiratory  distress.      Breath sounds: No rales.   Abdominal:      General: Bowel sounds are normal.      Palpations: Abdomen is soft.   Musculoskeletal:         General: Normal range of motion.      Cervical back: Normal range of motion and neck supple.   Skin:     General: Skin is warm and dry.   Neurological:      General: No focal deficit present.      Mental Status: He is alert and oriented to person, place, and time.         Significant Labs: All pertinent labs within the past 24 hours have been reviewed.    Significant Imaging: I have reviewed all pertinent imaging results/findings within the past 24 hours.

## 2022-02-02 NOTE — PLAN OF CARE
Problem: Physical Therapy Goal  Goal: Physical Therapy Goal  Description: Goals to be met by: 22     Patient will increase functional independence with mobility by performin. Supine <> sit with Stand-by Assistance  2. Sit to stand transfer with Stand-by Assistance  3. Bed to chair transfer with Stand-by Assistance using Rolling Walker  4. Gait  x 50 feet with Stand-by Assistance using Rolling Walker.   5. Lower extremity exercise program x 10 reps per handout, with supervision    Outcome: Ongoing, Progressing   Pt continues to work toward all goals. Able to perform ambulation training ~25-30 ft x 3 trials without AD requiring min A. 1st 2 trials without O2 donned with SpO2 levels decreasing and fluctuating between 78-82%. 3rd trial with 2L of O2 per Dr. Innocent levels fluctuated between 89-91%.

## 2022-02-02 NOTE — PROGRESS NOTES
[facility] Medicine  Telemedicine Progress Note    Patient Name: Norman Saunders  MRN: [id number]  Patient Class: IP- Inpatient   Admission Date: 1/27/2022  Length of Stay: 6 days  Attending Physician: Holly Rivera*  Primary Care Provider: Darshana Brandon MD          Subjective:     Principal Problem:Acute respiratory failure due to COVID-19        HPI:  Norman Saunders is an 89 y/o female with PMHx DMII, GERD, Major Depression Disorder presents to [facility] ED with complaints of weakness, fever, body aches, and decreased appetite. Patient states he was recently seen at an urgent care for his symptoms one week ago and tested negative for COVID-19. He was discharged with doxycycline which he completed his course of therapy. Family reports patient's home oxygen saturation was 85% on room air. Patient denies chest pain, palpitations, or leg swelling. Patient denies N/V/D. In ED: Labs remarkable for CRP for 157.8. CXR demonstrated low lung volumes with bilateral interstitial and patchy airspace opacities concerning for multifocal infection, including viral or bacterial pneumonia. Blood and urine cultures pending. COVID-19 protocol initiated. Will admit to hospital medicine for further evaluation and treatment.         Overview/Hospital Course:  No notes on file    Interval History: O2 requirements improving. On 4 L nasal cannula- ambulatory pulse ox  Plan for Home with home health, need shower chair    continue baricitinib, and dexamethasone  Possible discharge today      Review of Systems   Constitutional: Positive for fatigue. Negative for activity change, appetite change, chills, diaphoresis, fever and unexpected weight change.   Respiratory: Positive for cough. Negative for chest tightness, shortness of breath, wheezing and stridor.    Cardiovascular: Negative for chest pain, palpitations and leg swelling.   Gastrointestinal: Negative for abdominal pain, blood in stool, constipation,  diarrhea, nausea and vomiting.   Endocrine: Negative for cold intolerance and heat intolerance.   Genitourinary: Negative for difficulty urinating, dysuria, flank pain and frequency.   Musculoskeletal: Negative for arthralgias, joint swelling and myalgias.   Skin: Negative.    Neurological: Negative for dizziness, weakness, light-headedness and headaches.     Objective:     Vital Signs (Most Recent):  Temp: 96.9 °F (36.1 °C) (02/02/22 0715)  Pulse: 68 (02/02/22 0800)  Resp: 18 (02/02/22 0739)  BP: 124/67 (02/02/22 0715)  SpO2: 97 % (02/02/22 0739) Vital Signs (24h Range):  Temp:  [96.1 °F (35.6 °C)-97.5 °F (36.4 °C)] 96.9 °F (36.1 °C)  Pulse:  [53-72] 68  Resp:  [16-18] 18  SpO2:  [93 %-99 %] 97 %  BP: (124-155)/(65-79) 124/67     Weight: 71.6 kg (157 lb 13.6 oz)  Body mass index is 25.48 kg/m².    Intake/Output Summary (Last 24 hours) at 2/2/2022 0839  Last data filed at 2/1/2022 1700  Gross per 24 hour   Intake 490 ml   Output 500 ml   Net -10 ml      Physical Exam  Constitutional:       General: He is not in acute distress.     Appearance: He is not ill-appearing.   HENT:      Head: Normocephalic and atraumatic.   Cardiovascular:      Rate and Rhythm: Normal rate and regular rhythm.      Heart sounds: Normal heart sounds.   Pulmonary:      Effort: Pulmonary effort is normal. No respiratory distress.      Breath sounds: No rales.   Abdominal:      General: Bowel sounds are normal.      Palpations: Abdomen is soft.   Musculoskeletal:         General: Normal range of motion.      Cervical back: Normal range of motion and neck supple.   Skin:     General: Skin is warm and dry.   Neurological:      General: No focal deficit present.      Mental Status: He is alert and oriented to person, place, and time.         Significant Labs: All pertinent labs within the past 24 hours have been reviewed.    Significant Imaging: I have reviewed all pertinent imaging results/findings within the past 24 hours.      Assessment/Plan:       * Acute respiratory failure due to COVID-19  Patient with Hypoxic respiratory failure which is Acute which is not related to a recent procedure.  he is not on home oxygen. Supplemental oxygen was provided and noted- Nasal Cannula 4 LPM  Signs/symptoms of respiratory failure include- tachypnea, increased work of breathing, respiratory distress and use of accessory muscles. Contributing diagnoses includes - Pneumonia and covid-19 infection Labs and images were reviewed. Patient Has not has a recent ABG.   Will treat underlying causes and adjust management of respiratory failure as follows- Continue supplemental oxygen, albuterol INH for SOB/wheezing                COVID-19 Multifocal pneumonia  - COVID positive 1/27/22; initiated on protocol  - Isolation: Airborne/Droplet. Surgical mask on patient. Notify Infection Control  - CXR with low lung volumes with bilateral interstitial and patchy airspace opacities concerning for multifocal infection, including viral or bacterial pneumonia., requiring O2  - Monitor on Telemetry  -  continue on dexamethasone, will continue for 10d course  - continue on remdesivir x5d; daily CMP  -Patient received Levofloxacin IV x1  - initiated on ceftriaxone/azith x5d due to concern for superimposed CAP  - .8;   - Order RT consult via Respiratory Communication for COVID Protocols  - Incentive Spirometer Q4h  - Continuous Pulse Oximetry, goal SpO2 92-96%  - supplemental O2 PRN, will wean as tolerated  - Albuterol INH Q6h scheduled & PRN   - MVI & ascorbic acid 500mg PO BID  -Anti-tussives for cough  - Acetaminophen Q6hr PRN fever/headache  - Loperamide PRN viral diarrhea  - VTE PPx: enoxaparin   - PT/OT for debility/weakness  - If deterioration, may warrant trial of NIPPV in neg pressure room or immediate ICU consult  Low suspicion for superimposed bacterial infection, discontinue abx  Cont decadron/remdesivir  Cont baricitinib  Wean off supp O2 as tolerated  Keep neg fluid  balance  Discharge on home oxygen    Major depressive disorder  Stable  Reports does not take celexa anymore  Will continue to monitor      Type 2 diabetes mellitus with hyperglycemia, without long-term current use of insulin  Hemoglobin A1C   Date Value Ref Range Status   08/20/2021 6.6 (H) 4.0 - 5.6 % Final     Comment:     ADA Screening Guidelines:  5.7-6.4%  Consistent with prediabetes  >or=6.5%  Consistent with diabetes    High levels of fetal hemoglobin interfere with the HbA1C  assay. Heterozygous hemoglobin variants (HbS, HgC, etc)do  not significantly interfere with this assay.   However, presence of multiple variants may affect accuracy.                       Patient's FSGs are controlled on current medication regimen.  - home regimen includes metformin  - hold home oral medications  -CBG goal 140-180  - LDSSI with ACCUcheck ACHS  - Diabetic diet  -Hypoglycemia protocol PRN        GERD (gastroesophageal reflux disease)  Pantoprazole 40mg PO daily        VTE Risk Mitigation (From admission, onward)         Ordered     enoxaparin injection 80 mg  2 times daily         01/27/22 2254     IP VTE HIGH RISK PATIENT  Once         01/27/22 2254     Place sequential compression device  Until discontinued         01/27/22 2254                      I have assessed these finding virtually using telemed platform and with assistance of bedside nurse                 The attending portion of this evaluation, treatment, and documentation was performed per Holly Rivera MD via Telemedicine AudioVisual using the secure [employer] software platform with 2 way audio/video. The provider was located off-site and the patient is located in the hospital. The aforementioned video software was utilized to document the relevant history and physical exam    Holly Rivera MD  Department of Hospital Medicine   [place] [facility]   [phone]

## 2022-02-02 NOTE — DISCHARGE SUMMARY
[facility] Medicine  Discharge Summary      Patient Name: Norman Saunders  MRN: [id number]  Patient Class: IP- Inpatient  Admission Date: 1/27/2022  Hospital Length of Stay: 6 days  Discharge Date and Time: 2/2/2022  2:26 PM  Attending Physician: Holly Rivera*   Discharging Provider: Holly Rivera MD  Primary Care Provider: Darshana Brandon MD      HPI:   Norman Saunders is an 89 y/o female with PMHx DMII, GERD, Major Depression Disorder presents to [facility] ED with complaints of weakness, fever, body aches, and decreased appetite. Patient states he was recently seen at an urgent care for his symptoms one week ago and tested negative for COVID-19. He was discharged with doxycycline which he completed his course of therapy. Family reports patient's home oxygen saturation was 85% on room air. Patient denies chest pain, palpitations, or leg swelling. Patient denies N/V/D. In ED: Labs remarkable for CRP for 157.8. CXR demonstrated low lung volumes with bilateral interstitial and patchy airspace opacities concerning for multifocal infection, including viral or bacterial pneumonia. Blood and urine cultures pending. COVID-19 protocol initiated. Will admit to hospital medicine for further evaluation and treatment.         * No surgery found *      Hospital Course:   No notes on file     Goals of Care Treatment Preferences:  Code Status: Full Code      Consults:   Consults (From admission, onward)          Status Ordering Provider     Inpatient virtual consult to Hospital Medicine  Once        Provider:  (Not yet assigned)    Completed GODWIN SIBLEY     IP consult to case management  Once        Provider:  (Not yet assigned)    Acknowledged GODWIN SIBLEY            * Acute respiratory failure due to COVID-19  Patient with Hypoxic respiratory failure which is Acute which is not related to a recent procedure.  he is not on home oxygen. Supplemental oxygen was provided and noted- Nasal  Cannula 4 LPM  Signs/symptoms of respiratory failure include- tachypnea, increased work of breathing, respiratory distress and use of accessory muscles. Contributing diagnoses includes - Pneumonia and covid-19 infection Labs and images were reviewed. Patient Has not has a recent ABG.   Will treat underlying causes and adjust management of respiratory failure as follows- Continue supplemental oxygen, albuterol INH for SOB/wheezing                COVID-19 Multifocal pneumonia  - COVID positive 1/27/22; initiated on protocol  - Isolation: Airborne/Droplet. Surgical mask on patient. Notify Infection Control  - CXR with low lung volumes with bilateral interstitial and patchy airspace opacities concerning for multifocal infection, including viral or bacterial pneumonia., requiring O2  - Monitor on Telemetry  -  continue on dexamethasone, will continue for 10d course  - continue on remdesivir x5d; daily CMP  -Patient received Levofloxacin IV x1  - initiated on ceftriaxone/azith x5d due to concern for superimposed CAP  - .8;   - Order RT consult via Respiratory Communication for COVID Protocols  - Incentive Spirometer Q4h  - Continuous Pulse Oximetry, goal SpO2 92-96%  - supplemental O2 PRN, will wean as tolerated  - Albuterol INH Q6h scheduled & PRN   - MVI & ascorbic acid 500mg PO BID  -Anti-tussives for cough  - Acetaminophen Q6hr PRN fever/headache  - Loperamide PRN viral diarrhea  - VTE PPx: enoxaparin   - PT/OT for debility/weakness  - If deterioration, may warrant trial of NIPPV in neg pressure room or immediate ICU consult  Low suspicion for superimposed bacterial infection, discontinue abx  Cont decadron/remdesivir  Cont baricitinib  Wean off supp O2 as tolerated  Keep neg fluid balance  Discharge on home oxygen    Major depressive disorder  Stable  Reports does not take celexa anymore  Will continue to monitor      Type 2 diabetes mellitus with hyperglycemia, without long-term current use of  insulin  Hemoglobin A1C   Date Value Ref Range Status   08/20/2021 6.6 (H) 4.0 - 5.6 % Final     Comment:     ADA Screening Guidelines:  5.7-6.4%  Consistent with prediabetes  >or=6.5%  Consistent with diabetes    High levels of fetal hemoglobin interfere with the HbA1C  assay. Heterozygous hemoglobin variants (HbS, HgC, etc)do  not significantly interfere with this assay.   However, presence of multiple variants may affect accuracy.                       Patient's FSGs are controlled on current medication regimen.  - home regimen includes metformin  - hold home oral medications  -CBG goal 140-180  - LDSSI with ACCUcheck ACHS  - Diabetic diet  -Hypoglycemia protocol PRN        GERD (gastroesophageal reflux disease)  Pantoprazole 40mg PO daily        Final Active Diagnoses:    Diagnosis Date Noted POA    PRINCIPAL PROBLEM:  Acute respiratory failure due to COVID-19 [U07.1, J96.00] 01/27/2022 Yes    COVID-19 Multifocal pneumonia [J18.9] 01/27/2022 Yes    Major depressive disorder [F32.9] 05/26/2021 Yes    Type 2 diabetes mellitus with hyperglycemia, without long-term current use of insulin [E11.65] 05/26/2021 Yes    GERD (gastroesophageal reflux disease) [K21.9] 02/10/2015 Yes      Problems Resolved During this Admission:       Discharged Condition: stable    Disposition: Home or Self Care    Follow Up:   Follow-up Information       Darshana Brandon MD. Go in 1 week.    Specialty: Family Medicine  Why: FOLLOW UP WITH PCP  Contact information:  [address] [address] [phone]                           Patient Instructions:      OXYGEN FOR HOME USE     Order Specific Question Answer Comments   Liter Flow 2    Duration Continuous    Qualifying Test Performed at: Activity    Oxygen saturation at rest 91    Oxygen saturation with activity 82    Oxygen saturation with activity on oxygen 91    Portable mode: continuous    Route nasal cannula    Device: home concentrator with portable tanks    Length  "of need (in months): 3 mos    Patient condition with qualifying saturation Other - List qualifying diagnosis and code    Select a diagnosis & list the code in the comments COVID-19 virus infection [[id number]]    Height: 5' 6" (1.676 m)    Weight: 71.6 kg (157 lb 13.6 oz)    Alternative treatment measures have been tried or considered and deemed clinically ineffective. Yes      Ambulatory referral/consult to [facility]   Standing Status: Future   Referral Priority: Routine Referral Type: Consultation   Referral Reason: Specialty Services Required   Number of Visits Requested: 1     COVID-19 Surveillance Program     Order Specific Question Answer Comments   Does patient have a smartphone? Yes    Does patient have the [facility] on their smartphone? Yes    While in surveillance program, will patient be using home oxygen? Yes            Pending Diagnostic Studies:       None           Medications:  Reconciled Home Medications:      Medication List        START taking these medications      albuterol 90 mcg/actuation inhaler  Commonly known as: PROVENTIL/VENTOLIN HFA  Inhale 2 aspiraciones para los pulmones cada 6 (seis) horas. Rescate  (Inhale 2 puffs into the lungs every 6 (six) hours. Rescue)     benzonatate 100 MG capsule  Commonly known as: TESSALON  [place] 1 cápsula (100 mg en total) por vía oral 3 (nila) veces al día según sea necesario para la tos.  (Take 1 capsule (100 mg total) by mouth 3 (three) times daily as needed for Cough.)     dexAMETHasone 6 MG tablet  Commonly known as: DECADRON  Take 1 tablet (6 mg total) by mouth once daily. for 3 days     pulse oximeter device  Commonly known as: pulse oximeter  aplicar por vía externa 2 (dos) veces al día. Úselo dos veces al día, a las 8 AM y a las 3 PM, y registre el valor en MyChart según las indicaciones.  (by Apply Externally route 2 (two) times a day. Use twice daily at 8 AM and 3 PM and record the value in MyChart as " directed.)     VITAMIN C 500 MG tablet  Generic drug: ascorbic acid (vitamin C)  [place] krzysztof tableta (500 mg en total) por vía oral 2 veces al día.  (Take 1 tablet (500 mg total) by mouth 2 (two) times daily.)            CONTINUE taking these medications      citalopram 10 MG tablet  Commonly known as: CeleXA  Take 1 tablet (10 mg total) by mouth once daily.     clotrimazole 1 % Soln  Commonly known as: LOTRIMIN  Apply 4 drops to affected ear twice daily     diclofenac sodium 1 % Gel  Commonly known as: VOLTAREN  Apply 2 g topically once daily.     fluticasone propionate 50 mcg/actuation nasal spray  Commonly known as: FLONASE  2 SPRAYS (100 MCG TOTAL) BY EACH NOSTRIL ROUTE ONCE DAILY.     gabapentin 800 MG tablet  Commonly known as: NEURONTIN  Take 0.5 tablets (400 mg total) by mouth 2 (two) times daily.     meclizine 12.5 mg tablet  Commonly known as: ANTIVERT  Take 1 tablet (12.5 mg total) by mouth 2 (two) times daily as needed for Dizziness.     metFORMIN 500 MG ER 24hr tablet  Commonly known as: GLUCOPHAGE-XR  Take 1 tablet (500 mg total) by mouth daily with breakfast.     omeprazole 20 MG capsule  Commonly known as: PRILOSEC  Take 1 capsule (20 mg total) by mouth once daily. Para el reflujo.     prednisoLONE acetate 1 % Drps  Commonly known as: PRED FORTE  Place 1 drop into both eyes 4 (four) times daily.            STOP taking these medications      doxycycline 100 MG tablet  Commonly known as: VIBRA-TABS     meloxicam 7.5 MG tablet  Commonly known as: MOBIC              Indwelling Lines/Drains at time of discharge:   Lines/Drains/Airways       None                   Time spent on the discharge of patient: 35 minutes         The attending portion of this evaluation, treatment, and documentation was performed per Holly Rivera MD via Telemedicine AudioVisual using the secure VIVA software platform with 2 way audio/video. The provider was located off-site and the patient is located in the hospital. The  aforementioned video software was utilized to document the relevant history and physical exam    Holly Rivera MD  Department of [facility] Medicine  [facility] - Telemetry

## 2022-02-03 ENCOUNTER — PATIENT MESSAGE (OUTPATIENT)
Dept: ADMINISTRATIVE | Facility: OTHER | Age: 87
End: 2022-02-03
Payer: COMMERCIAL

## 2022-02-04 ENCOUNTER — PATIENT MESSAGE (OUTPATIENT)
Dept: ADMINISTRATIVE | Facility: OTHER | Age: 87
End: 2022-02-04
Payer: COMMERCIAL

## 2022-02-04 ENCOUNTER — PATIENT MESSAGE (OUTPATIENT)
Dept: ADMINISTRATIVE | Facility: CLINIC | Age: 87
End: 2022-02-04
Payer: COMMERCIAL

## 2022-02-07 ENCOUNTER — PES CALL (OUTPATIENT)
Dept: HOME HEALTH SERVICES | Facility: CLINIC | Age: 87
End: 2022-02-07
Payer: COMMERCIAL

## 2022-02-08 ENCOUNTER — PES CALL (OUTPATIENT)
Dept: HOME HEALTH SERVICES | Facility: CLINIC | Age: 87
End: 2022-02-08
Payer: COMMERCIAL

## 2022-02-09 ENCOUNTER — PATIENT MESSAGE (OUTPATIENT)
Dept: ADMINISTRATIVE | Facility: OTHER | Age: 87
End: 2022-02-09
Payer: COMMERCIAL

## 2022-02-09 ENCOUNTER — PATIENT MESSAGE (OUTPATIENT)
Dept: ADMINISTRATIVE | Facility: CLINIC | Age: 87
End: 2022-02-09
Payer: COMMERCIAL

## 2022-02-09 ENCOUNTER — PES CALL (OUTPATIENT)
Dept: HOME HEALTH SERVICES | Facility: CLINIC | Age: 87
End: 2022-02-09
Payer: COMMERCIAL

## 2022-02-10 ENCOUNTER — PATIENT MESSAGE (OUTPATIENT)
Dept: ADMINISTRATIVE | Facility: OTHER | Age: 87
End: 2022-02-10
Payer: COMMERCIAL

## 2022-02-10 ENCOUNTER — PATIENT MESSAGE (OUTPATIENT)
Dept: ADMINISTRATIVE | Facility: CLINIC | Age: 87
End: 2022-02-10
Payer: COMMERCIAL

## 2022-02-11 ENCOUNTER — OFFICE VISIT (OUTPATIENT)
Dept: FAMILY MEDICINE | Facility: CLINIC | Age: 87
End: 2022-02-11
Payer: COMMERCIAL

## 2022-02-11 ENCOUNTER — LAB VISIT (OUTPATIENT)
Dept: LAB | Facility: HOSPITAL | Age: 87
End: 2022-02-11
Attending: INTERNAL MEDICINE
Payer: COMMERCIAL

## 2022-02-11 ENCOUNTER — PATIENT MESSAGE (OUTPATIENT)
Dept: ADMINISTRATIVE | Facility: CLINIC | Age: 87
End: 2022-02-11
Payer: COMMERCIAL

## 2022-02-11 VITALS
OXYGEN SATURATION: 98 % | WEIGHT: 158.94 LBS | HEART RATE: 77 BPM | DIASTOLIC BLOOD PRESSURE: 72 MMHG | HEIGHT: 66 IN | BODY MASS INDEX: 25.54 KG/M2 | SYSTOLIC BLOOD PRESSURE: 114 MMHG

## 2022-02-11 DIAGNOSIS — H60.60 CHRONIC OTITIS EXTERNA, UNSPECIFIED LATERALITY, UNSPECIFIED TYPE: ICD-10-CM

## 2022-02-11 DIAGNOSIS — I67.2 CEREBRAL ATHEROSCLEROSIS: ICD-10-CM

## 2022-02-11 DIAGNOSIS — J18.9 MULTIFOCAL PNEUMONIA: Primary | ICD-10-CM

## 2022-02-11 DIAGNOSIS — G50.0 TRIGEMINAL NEURALGIA: ICD-10-CM

## 2022-02-11 DIAGNOSIS — R53.1 WEAKNESS GENERALIZED: ICD-10-CM

## 2022-02-11 DIAGNOSIS — R63.0 POOR APPETITE: ICD-10-CM

## 2022-02-11 DIAGNOSIS — E11.65 TYPE 2 DIABETES MELLITUS WITH HYPERGLYCEMIA, WITHOUT LONG-TERM CURRENT USE OF INSULIN: ICD-10-CM

## 2022-02-11 DIAGNOSIS — M54.50 CHRONIC LOW BACK PAIN, UNSPECIFIED BACK PAIN LATERALITY, UNSPECIFIED WHETHER SCIATICA PRESENT: ICD-10-CM

## 2022-02-11 DIAGNOSIS — G89.29 CHRONIC LOW BACK PAIN, UNSPECIFIED BACK PAIN LATERALITY, UNSPECIFIED WHETHER SCIATICA PRESENT: ICD-10-CM

## 2022-02-11 LAB
BACTERIA #/AREA URNS HPF: ABNORMAL /HPF
BILIRUB UR QL STRIP: NEGATIVE
CLARITY UR: CLEAR
COLOR UR: YELLOW
GLUCOSE UR QL STRIP: NEGATIVE
HGB UR QL STRIP: NEGATIVE
HYALINE CASTS #/AREA URNS LPF: 1 /LPF
KETONES UR QL STRIP: NEGATIVE
LEUKOCYTE ESTERASE UR QL STRIP: NEGATIVE
MICROSCOPIC COMMENT: ABNORMAL
NITRITE UR QL STRIP: NEGATIVE
PH UR STRIP: 6 [PH] (ref 5–8)
PROT UR QL STRIP: ABNORMAL
RBC #/AREA URNS HPF: 6 /HPF (ref 0–4)
SP GR UR STRIP: 1.03 (ref 1–1.03)
SQUAMOUS #/AREA URNS HPF: 0 /HPF
URN SPEC COLLECT METH UR: ABNORMAL
UROBILINOGEN UR STRIP-ACNC: ABNORMAL EU/DL
WBC #/AREA URNS HPF: 2 /HPF (ref 0–5)

## 2022-02-11 PROCEDURE — 99999 PR PBB SHADOW E&M-EST. PATIENT-LVL IV: CPT | Mod: PBBFAC,,, | Performed by: INTERNAL MEDICINE

## 2022-02-11 PROCEDURE — 99999 PR PBB SHADOW E&M-EST. PATIENT-LVL IV: ICD-10-PCS | Mod: PBBFAC,,, | Performed by: INTERNAL MEDICINE

## 2022-02-11 PROCEDURE — 82570 ASSAY OF URINE CREATININE: CPT | Performed by: INTERNAL MEDICINE

## 2022-02-11 PROCEDURE — 87086 URINE CULTURE/COLONY COUNT: CPT | Performed by: INTERNAL MEDICINE

## 2022-02-11 PROCEDURE — 99215 PR OFFICE/OUTPT VISIT, EST, LEVL V, 40-54 MIN: ICD-10-PCS | Mod: S$GLB,,, | Performed by: INTERNAL MEDICINE

## 2022-02-11 PROCEDURE — 99214 OFFICE O/P EST MOD 30 MIN: CPT | Mod: PBBFAC,PO | Performed by: INTERNAL MEDICINE

## 2022-02-11 PROCEDURE — 99215 OFFICE O/P EST HI 40 MIN: CPT | Mod: S$GLB,,, | Performed by: INTERNAL MEDICINE

## 2022-02-11 PROCEDURE — 81000 URINALYSIS NONAUTO W/SCOPE: CPT | Performed by: INTERNAL MEDICINE

## 2022-02-11 RX ORDER — INSULIN PUMP SYRINGE, 3 ML
EACH MISCELLANEOUS
Qty: 1 EACH | Refills: 5 | Status: SHIPPED | OUTPATIENT
Start: 2022-02-11 | End: 2023-02-11

## 2022-02-11 RX ORDER — MEGESTROL ACETATE 40 MG/ML
40 SUSPENSION ORAL DAILY
Qty: 200 ML | Refills: 2 | Status: SHIPPED | OUTPATIENT
Start: 2022-02-11 | End: 2022-07-21

## 2022-02-11 RX ORDER — CLOTRIMAZOLE 1 G/ML
SOLUTION TOPICAL
Qty: 10 ML | Refills: 2 | Status: SHIPPED | OUTPATIENT
Start: 2022-02-11 | End: 2023-05-12

## 2022-02-11 RX ORDER — MEGESTROL ACETATE 40 MG/ML
200 SUSPENSION ORAL DAILY
Qty: 150 ML | Refills: 11 | Status: SHIPPED | OUTPATIENT
Start: 2022-02-11 | End: 2022-02-11

## 2022-02-11 RX ORDER — ASPIRIN 81 MG/1
81 TABLET ORAL DAILY
Qty: 30 TABLET | Refills: 11 | Status: SHIPPED | OUTPATIENT
Start: 2022-02-11 | End: 2023-02-11

## 2022-02-11 RX ORDER — NABUMETONE 500 MG/1
500 TABLET, FILM COATED ORAL 2 TIMES DAILY PRN
Qty: 30 TABLET | Refills: 0 | Status: SHIPPED | OUTPATIENT
Start: 2022-02-11 | End: 2022-04-21

## 2022-02-11 RX ORDER — LANCETS
EACH MISCELLANEOUS
Qty: 100 EACH | Refills: 11 | Status: SHIPPED | OUTPATIENT
Start: 2022-02-11

## 2022-02-11 RX ORDER — MEGESTROL ACETATE 40 MG/ML
200 SUSPENSION ORAL DAILY
Qty: 200 ML | Refills: 2 | Status: SHIPPED | OUTPATIENT
Start: 2022-02-11 | End: 2022-02-11

## 2022-02-11 NOTE — PROGRESS NOTES
Subjective:       Patient ID: Norman Saunders is a 89 y.o. male.    Chief Complaint: Hospital Follow Up      HPI  Norman Saunders is a 89 y.o. male with chronic conditions of diabetes mellitus type 2, depression, trigeminal neuralgia, GERD who presents today for hospital follow-up.    The patient was admitted on January 27 when he presented weakness, fever, body aches, and decreased appetite.  He was seen a few days before at the urgent care and tested negative.  He was sent home on doxycycline.  His symptoms did not improve and his pulse arts went down to 85-87%.  He was brought to the ER again.  His chest x-ray shows interstitial and patchy airspace disease, increased CRP, and hypoxemic.  He was admitted and was given all to supplement with the high-flow cannula, Rocephin, dexamethasone, and remdesivir.  Family noted some confused but now doing better.  He sent home on home O2 and will start home health.      Appetite has been okay.  His major problem S chronic pain with chronic low back pain and the trigeminal neuralgia.  Medications give him drowsiness and has been using Tylenol or ibuprofen with some relief.  His grandson works in healthcare and has been helping with his care.  Interested in pain management evaluation for possible epidural steroid injection or nerve blocks.  He had totally gave the patient Relafen and appears to help.  Trying to avoid medications that making drowsy as his appetite has been poor.        Health Maintenance:  Health Maintenance   Topic Date Due    Foot Exam  Never done    TETANUS VACCINE  Never done    Hemoglobin A1c  08/11/2022    Lipid Panel  08/20/2022    Eye Exam  08/24/2022       Review of Systems   Constitutional: Positive for appetite change, fatigue and fever.   HENT: Positive for nasal congestion.    Respiratory: Positive for cough and shortness of breath.    Cardiovascular: Negative for chest pain and palpitations.   Gastrointestinal: Negative.    Genitourinary:  Negative for difficulty urinating.   Musculoskeletal: Positive for arthralgias, back pain and myalgias.   Neurological: Positive for weakness. Negative for headaches.   Psychiatric/Behavioral: Positive for confusion.      Past Medical History:   Diagnosis Date    Cataract     COVID-19 01/27/2022    GERD (gastroesophageal reflux disease)     Trigeminal neuralgia     Type 2 diabetes mellitus with hyperglycemia, without long-term current use of insulin 5/26/2021       Past Surgical History:   Procedure Laterality Date    CATARACT EXTRACTION         Family History   Problem Relation Age of Onset    Amblyopia Neg Hx     Blindness Neg Hx     Cancer Neg Hx     Cataracts Neg Hx     Diabetes Neg Hx     Glaucoma Neg Hx     Hypertension Neg Hx     Macular degeneration Neg Hx     Retinal detachment Neg Hx     Strabismus Neg Hx     Stroke Neg Hx     Thyroid disease Neg Hx        Social History     Socioeconomic History    Marital status:    Tobacco Use    Smoking status: Never Smoker    Smokeless tobacco: Never Used   Substance and Sexual Activity    Alcohol use: No    Drug use: No       Current Outpatient Medications   Medication Sig Dispense Refill    ascorbic acid, vitamin C, (VITAMIN C) 500 MG tablet Take 1 tablet (500 mg total) by mouth 2 (two) times daily. 30 tablet 0    fluticasone propionate (FLONASE) 50 mcg/actuation nasal spray 2 SPRAYS (100 MCG TOTAL) BY EACH NOSTRIL ROUTE ONCE DAILY. 48 mL 1    gabapentin (NEURONTIN) 800 MG tablet Take 0.5 tablets (400 mg total) by mouth 2 (two) times daily. 180 tablet 3    meclizine (ANTIVERT) 12.5 mg tablet Take 1 tablet (12.5 mg total) by mouth 2 (two) times daily as needed for Dizziness. 60 tablet 6    metFORMIN (GLUCOPHAGE-XR) 500 MG ER 24hr tablet Take 1 tablet (500 mg total) by mouth daily with breakfast. 90 tablet 3    omeprazole (PRILOSEC) 20 MG capsule Take 1 capsule (20 mg total) by mouth once daily. Para el reflujo. 90 capsule 3     pulse oximeter (PULSE OXIMETER) device by Apply Externally route 2 (two) times a day. Use twice daily at 8 AM and 3 PM and record the value in [facility] as directed. 1 each 0    aspirin (ECOTRIN) 81 MG EC tablet Take 1 tablet (81 mg total) by mouth once daily. 30 tablet 11    blood sugar diagnostic Strp To check BG 2 times daily, to use with insurance preferred meter 100 strip 11    blood-glucose meter kit To check BG 2 times daily, to use with insurance preferred meter 1 each 5    clotrimazole (LOTRIMIN) 1 % Soln Apply 4 drops to affected ear twice daily 10 mL 2    lancets Misc To check BG 2 times daily, to use with insurance preferred meter 100 each 11    megestroL (MEGACE) 400 mg/10 mL (40 mg/mL) Susp Take 1 mL (40 mg total) by mouth once daily. 200 mL 2    nabumetone (RELAFEN) 500 MG tablet Take 1 tablet (500 mg total) by mouth 2 (two) times daily as needed for Pain. 30 tablet 0     No current facility-administered medications for this visit.       Review of patient's allergies indicates:  No Known Allergies      Objective:       Last 3 sets of Vitals    Vitals - 1 value per visit 2/2/2022 2/11/2022 2/11/2022   SYSTOLIC - - 114   DIASTOLIC - - 72   Pulse 65 - 77   Temp - - -   Resp - - -   SPO2 - - 98   Weight (lb) - - 158.95   Weight (kg) - - 72.1   Height - - 66   BMI (Calculated) - - 25.7   VISIT REPORT - - -   Pain Score  - 0 -   Physical Exam  Constitutional:       General: He is not in acute distress.     Appearance: Normal appearance. He is normal weight.      Comments: Son and grandson at his side   HENT:      Head: Normocephalic.      Right Ear: Tympanic membrane normal.      Left Ear: Tympanic membrane normal.      Ears:      Comments: One plaque or exudate the external canal bilaterally without much erythema.     Nose: Nose normal.   Eyes:      General: No scleral icterus.     Extraocular Movements: Extraocular movements intact.      Conjunctiva/sclera: Conjunctivae normal.   Neck:      Vascular:  No carotid bruit.   Cardiovascular:      Rate and Rhythm: Normal rate and regular rhythm.      Pulses: Normal pulses.      Heart sounds: Normal heart sounds.   Pulmonary:      Effort: Pulmonary effort is normal.      Breath sounds: Normal breath sounds.   Abdominal:      General: Bowel sounds are normal. There is no distension.      Palpations: Abdomen is soft.      Tenderness: There is no abdominal tenderness.   Musculoskeletal:         General: No swelling. Normal range of motion.   Lymphadenopathy:      Cervical: No cervical adenopathy.   Skin:     General: Skin is warm and dry.   Neurological:      General: No focal deficit present.      Mental Status: He is alert and oriented to person, place, and time.   Psychiatric:         Mood and Affect: Mood normal.         Behavior: Behavior normal.           CBC:  Recent Labs   Lab 01/28/22  0401 01/28/22  0401 01/29/22  0607 01/29/22  0607 02/01/22  0353   WBC 6.24   < > 8.41   < > 7.16   RBC 4.49 L   < > 4.70   < > 4.76   Hemoglobin 12.3 L   < > 12.5 L   < > 12.9 L   Hematocrit 37.4 L   < > 39.0 L   < > 39.4 L   Platelets 324   < > 399   < > 404   MCV 83   < > 83   < > 83   MCH 27.4   < > 26.6 L   < > 27.1   MCHC 32.9  --  32.1  --  32.7    < > = values in this interval not displayed.     CMP:  Recent Labs   Lab 01/29/22  0607 01/30/22  0359 02/01/22  0353 02/01/22  0353 02/11/22  1255   Glucose 140 H   < > 114 H   < > 117 H   Calcium 9.6   < > 8.7   < > 9.8   Albumin 2.7 L  --  2.9 L   < > 3.1 L   Total Protein 7.1  --  6.2   < > 7.6   Sodium 141   < > 141   < > 137   Potassium 3.7   < > 4.2   < > 4.2   CO2 23   < > 24   < > 23   Chloride 107   < > 106   < > 103   BUN 30 H   < > 27 H   < > 12   Creatinine 0.9   < > 0.8   < > 1.1   Alkaline Phosphatase 64  --  62   < > 76   ALT 16  --  35   < > 20   AST 20  --  32   < > 18   Total Bilirubin 0.3  --  0.4  --  0.5    < > = values in this interval not displayed.     URINALYSIS:  Recent Labs   Lab 04/20/21  7429  04/20/21  0835 01/28/22  0035 01/28/22  0035 02/11/22  1221   Color, UA Yellow   < > Yellow   < > Yellow   Specific Gravity, UA >=1.030 A   < > >1.030 A   < > 1.030   pH, UA 6.0   < > 7.0   < > 6.0   Protein, UA 1+ A   < > 2+ A   < > 1+ A   Bacteria Rare   < > None   < > Rare   Nitrite, UA Negative   < > Negative   < > Negative   Leukocytes, UA Negative   < > Negative   < > Negative   Urobilinogen, UA Negative   < > Negative   < > 2.0-3.0 A   Hyaline Casts, UA 0  --  0  --  1    < > = values in this interval not displayed.      LIPIDS:  Recent Labs   Lab 04/20/21  0855 08/20/21  0742 02/11/22  1255   TSH 3.232  --  1.751   HDL 41 35 L  --    Cholesterol 219 H 182  --    Triglycerides 138 144  --    LDL Cholesterol 150.4 118.2  --    HDL/Cholesterol Ratio 18.7 L 19.2 L  --    Non-HDL Cholesterol 178 147  --    Total Cholesterol/HDL Ratio 5.3 H 5.2 H  --      TSH:  Recent Labs   Lab 04/20/21  0855 02/11/22  1255   TSH 3.232 1.751       A1C:  Recent Labs   Lab 04/20/21  0855 08/20/21  0742 02/11/22  1255   Hemoglobin A1C 8.3 H 6.6 H 6.9 H       Imaging:  X-Ray Chest AP Portable  Narrative: EXAMINATION:  XR CHEST AP PORTABLE    CLINICAL HISTORY:  comparison;    TECHNIQUE:  Single frontal view of the chest was performed.    COMPARISON:  01/27/2022    FINDINGS:  The lungs are under expanded with crowding of the pulmonary vascularity.  Persistent patchy interstitial opacities distributed throughout both lungs, similar to the prior examination.  Heart size is normal.  Skeletal structures intact.  Impression: No interval change from 01/27/2022.    Electronically signed by: Lucila Sarah MD  Date:    01/30/2022  Time:    07:59      Assessment:       1. COVID-19 Multifocal pneumonia    2. Type 2 diabetes mellitus with hyperglycemia, without long-term current use of insulin    3. Chronic otitis externa, unspecified laterality, unspecified type    4. Trigeminal neuralgia    5. Chronic low back pain, unspecified back pain  laterality, unspecified whether sciatica present    6. Cerebral atherosclerosis    7. Poor appetite    8. Weakness generalized          Chronic conditions status updated as per HPI. Other than changes above, cont current medications and maintain follow up with specialist. Return to clinic in 4 months.  Plan:       Norman was seen today for hospital follow up.  Medications and chart were reviewed.    Diagnoses and all orders for this visit:    COVID-19 Multifocal pneumonia  -     aspirin (ECOTRIN) 81 MG EC tablet; Take 1 tablet (81 mg total) by mouth once daily.    Type 2 diabetes mellitus with hyperglycemia, without long-term current use of insulin  -     Discontinue: megestroL (MEGACE) 400 mg/10 mL (40 mg/mL) Susp; Take 5 mLs (200 mg total) by mouth once daily.  -     blood-glucose meter kit; To check BG 2 times daily, to use with insurance preferred meter  -     lancets Misc; To check BG 2 times daily, to use with insurance preferred meter  -     blood sugar diagnostic Strp; To check BG 2 times daily, to use with insurance preferred meter  -     Comprehensive Metabolic Panel; Future  -     Hemoglobin A1C; Future  -     TSH; Future  -     Microalbumin/Creatinine Ratio, Urine; Future      Chronic otitis externa, unspecified laterality, unspecified type  -     clotrimazole (LOTRIMIN) 1 % Soln; Apply 4 drops to affected ear twice daily    Trigeminal neuralgia  -     Ambulatory referral/consult to Pain Clinic; Future    Chronic low back pain, unspecified back pain laterality, unspecified whether sciatica present  -     Ambulatory referral/consult to Pain Clinic; Future  -     Urinalysis; Future  -     Urine culture; Future  -     nabumetone (RELAFEN) 500 MG tablet; Take 1 tablet (500 mg total) by mouth 2 (two) times daily as needed for Pain.    Cerebral atherosclerosis    Poor appetite/ Weakness generalized  -     megestroL (MEGACE) 400 mg/10 mL (40 mg/mL) Susp; Take 1 mL (40 mg total) by mouth once daily.      Health  Maintenance Due   Topic Date Due    Foot Exam  Never done    TETANUS VACCINE  Never done    Shingles Vaccine (1 of 2) Never done    COVID-19 Vaccine (3 - Booster for Pfizer series) 02/26/2022        Darshana Brandon MD  [facility]  Disclaimer:  This note has been generated using voice-recognition software. There may be grammatical or spelling errors that have been missed during proof-reading

## 2022-02-12 ENCOUNTER — PATIENT MESSAGE (OUTPATIENT)
Dept: ADMINISTRATIVE | Facility: CLINIC | Age: 87
End: 2022-02-12
Payer: COMMERCIAL

## 2022-02-12 ENCOUNTER — NURSE TRIAGE (OUTPATIENT)
Dept: ADMINISTRATIVE | Facility: CLINIC | Age: 87
End: 2022-02-12
Payer: COMMERCIAL

## 2022-02-12 LAB
ALBUMIN/CREAT UR: 13.3 UG/MG (ref 0–30)
CREAT UR-MCNC: 309 MG/DL (ref 23–375)
MICROALBUMIN UR DL<=1MG/L-MCNC: 41 UG/ML

## 2022-02-12 NOTE — TELEPHONE ENCOUNTER
Pt escalated for no response. Pt has had 4 or more no responses in a row and has not responded to follow up calls or [employer] messages. Removed surveillance tasks      Reason for Disposition   Caller has cancelled the call before the first contact    Protocols used: NO CONTACT OR DUPLICATE CONTACT CALL-A-AH

## 2022-02-13 LAB — BACTERIA UR CULT: NO GROWTH

## 2022-02-14 ENCOUNTER — PATIENT MESSAGE (OUTPATIENT)
Dept: FAMILY MEDICINE | Facility: CLINIC | Age: 87
End: 2022-02-14
Payer: COMMERCIAL

## 2022-02-15 ENCOUNTER — PATIENT MESSAGE (OUTPATIENT)
Dept: FAMILY MEDICINE | Facility: CLINIC | Age: 87
End: 2022-02-15
Payer: COMMERCIAL

## 2022-02-16 ENCOUNTER — CARE AT HOME (OUTPATIENT)
Dept: HOME HEALTH SERVICES | Facility: CLINIC | Age: 87
End: 2022-02-16
Payer: COMMERCIAL

## 2022-02-16 DIAGNOSIS — U07.1 COVID-19 VIRUS INFECTION: ICD-10-CM

## 2022-02-16 DIAGNOSIS — G50.0 TRIGEMINAL NEURALGIA: Primary | ICD-10-CM

## 2022-02-16 PROCEDURE — 99350 PR HOME VISIT,ESTAB PATIENT,LEVEL IV: ICD-10-PCS | Mod: CR,S$GLB,, | Performed by: NURSE PRACTITIONER

## 2022-02-16 PROCEDURE — 99350 HOME/RES VST EST HIGH MDM 60: CPT | Mod: CR,S$GLB,, | Performed by: NURSE PRACTITIONER

## 2022-02-19 VITALS — RESPIRATION RATE: 18 BRPM | HEART RATE: 70 BPM | OXYGEN SATURATION: 97 %

## 2022-02-19 NOTE — PROGRESS NOTES
[facility]  Transition of Care Home Visit    Visit Date: 2/19/2022  Encounter Provider: Yelena Farfan NP  PCP:  Darshana Brandon MD    PRESENTING HISTORY      Patient ID: Norman Saunders is a 89 y.o. male.    Consult Requested By:  Dr. Holly Chinchilla-*  Reason for Consult:  Hospital Follow Up    Norman is being seen at home due to physical debility that presents a taxing effort to leave the home, to mitigate high risk of hospital readmission and/or due to the limited availability of reliable or safe options for transportation to the point of access to the provider. Prior to treatment on this visit the chart was reviewed and patient verbal consent was obtained.        Chief Complaint: Transitional Care and Shortness of Breath      History of Present Illness: Mr. Norman Saunders is a 89 y.o. male who was recently admitted to the hospital.    Admit: 1/27/22  Discharge: 2/5/22  Acute respiratory failure due to COVID-19  Patient with Hypoxic respiratory failure which is Acute which is not related to a recent procedure.  he is not on home oxygen. Supplemental oxygen was provided and noted- Nasal Cannula 4 LPM  Signs/symptoms of respiratory failure include- tachypnea, increased work of breathing, respiratory distress and use of accessory muscles. Contributing diagnoses includes - Pneumonia and covid-19 infection Labs and images were reviewed. Patient Has not has a recent ABG.   Will treat underlying causes and adjust management of respiratory failure as follows- Continue supplemental oxygen, albuterol INH for SOB/wheezing                       COVID-19 Multifocal pneumonia  - COVID positive 1/27/22; initiated on protocol  - Isolation: Airborne/Droplet. Surgical mask on patient. Notify Infection Control  - CXR with low lung volumes with bilateral interstitial and patchy airspace opacities concerning for multifocal infection, including viral or bacterial pneumonia., requiring O2  - Monitor on Telemetry  -  continue  on dexamethasone, will continue for 10d course  - continue on remdesivir x5d; daily CMP  -Patient received Levofloxacin IV x1  - initiated on ceftriaxone/azith x5d due to concern for superimposed CAP  - .8;   - Order RT consult via Respiratory Communication for COVID Protocols  - Incentive Spirometer Q4h  - Continuous Pulse Oximetry, goal SpO2 92-96%  - supplemental O2 PRN, will wean as tolerated  - Albuterol INH Q6h scheduled & PRN   - MVI & ascorbic acid 500mg PO BID  -Anti-tussives for cough  - Acetaminophen Q6hr PRN fever/headache  - Loperamide PRN viral diarrhea  - VTE PPx: enoxaparin   - PT/OT for debility/weakness  - If deterioration, may warrant trial of NIPPV in neg pressure room or immediate ICU consult  Low suspicion for superimposed bacterial infection, discontinue abx  Cont decadron/remdesivir  Cont baricitinib  Wean off supp O2 as tolerated  Keep neg fluid balance  Discharge on home oxygen  ___________________________________________________________________    Today:    HPI:  PT is being seen today for hospital follow up. His son is present for this visit.    He reports he is doing well related to his COVID. He is using oxygen, but energy and activity is increasing. He believes he can take breaks from oxygen  He is having pain to his back and plans to see pain management for epidural injections    Review of Systems   Constitutional: Negative for activity change, fatigue and fever.   HENT: Negative.    Eyes: Negative.    Respiratory: Negative for chest tightness.    Cardiovascular: Negative.  Negative for leg swelling.   Gastrointestinal: Negative.    Endocrine: Negative.    Genitourinary: Negative.    Musculoskeletal: Positive for back pain.   Skin: Negative.    Allergic/Immunologic: Negative.    Neurological: Negative.    Hematological: Negative.    Psychiatric/Behavioral: Negative.  Negative for agitation.   All other systems reviewed and are negative.          PAST HISTORY:     Past Medical  History:   Diagnosis Date    Cataract     COVID-19 01/27/2022    GERD (gastroesophageal reflux disease)     Trigeminal neuralgia     Type 2 diabetes mellitus with hyperglycemia, without long-term current use of insulin 5/26/2021       Past Surgical History:   Procedure Laterality Date    CATARACT EXTRACTION         Family History   Problem Relation Age of Onset    Amblyopia Neg Hx     Blindness Neg Hx     Cancer Neg Hx     Cataracts Neg Hx     Diabetes Neg Hx     Glaucoma Neg Hx     Hypertension Neg Hx     Macular degeneration Neg Hx     Retinal detachment Neg Hx     Strabismus Neg Hx     Stroke Neg Hx     Thyroid disease Neg Hx        Social History     Socioeconomic History    Marital status:    Tobacco Use    Smoking status: Never Smoker    Smokeless tobacco: Never Used   Substance and Sexual Activity    Alcohol use: No    Drug use: No       MEDICATIONS & ALLERGIES:     Current Outpatient Medications on File Prior to Visit   Medication Sig Dispense Refill    ascorbic acid, vitamin C, (VITAMIN C) 500 MG tablet Take 1 tablet (500 mg total) by mouth 2 (two) times daily. 30 tablet 0    aspirin (ECOTRIN) 81 MG EC tablet Take 1 tablet (81 mg total) by mouth once daily. 30 tablet 11    blood sugar diagnostic Strp To check BG 2 times daily, to use with insurance preferred meter 100 strip 11    blood-glucose meter kit To check BG 2 times daily, to use with insurance preferred meter 1 each 5    clotrimazole (LOTRIMIN) 1 % Soln Apply 4 drops to affected ear twice daily 10 mL 2    fluticasone propionate (FLONASE) 50 mcg/actuation nasal spray 2 SPRAYS (100 MCG TOTAL) BY EACH NOSTRIL ROUTE ONCE DAILY. 48 mL 1    gabapentin (NEURONTIN) 800 MG tablet Take 0.5 tablets (400 mg total) by mouth 2 (two) times daily. 180 tablet 3    lancets Misc To check BG 2 times daily, to use with insurance preferred meter 100 each 11    meclizine (ANTIVERT) 12.5 mg tablet Take 1 tablet (12.5 mg total) by  mouth 2 (two) times daily as needed for Dizziness. 60 tablet 6    megestroL (MEGACE) 400 mg/10 mL (40 mg/mL) Susp Take 1 mL (40 mg total) by mouth once daily. 200 mL 2    metFORMIN (GLUCOPHAGE-XR) 500 MG ER 24hr tablet Take 1 tablet (500 mg total) by mouth daily with breakfast. 90 tablet 3    nabumetone (RELAFEN) 500 MG tablet Take 1 tablet (500 mg total) by mouth 2 (two) times daily as needed for Pain. 30 tablet 0    omeprazole (PRILOSEC) 20 MG capsule Take 1 capsule (20 mg total) by mouth once daily. Para el reflujo. 90 capsule 3    pulse oximeter (PULSE OXIMETER) device by Apply Externally route 2 (two) times a day. Use twice daily at 8 AM and 3 PM and record the value in [employer] as directed. 1 each 0     No current facility-administered medications on file prior to visit.        Review of patient's allergies indicates:  No Known Allergies    OBJECTIVE:     Vital Signs:  Vitals:    02/16/22 1256   Pulse: 70   Resp: 18     There is no height or weight on file to calculate BMI.     Physical Exam:  Physical Exam  Vitals reviewed.   Constitutional:       General: He is not in acute distress.     Appearance: He is well-developed.   HENT:      Head: Normocephalic and atraumatic.   Eyes:      Pupils: Pupils are equal, round, and reactive to light.   Cardiovascular:      Rate and Rhythm: Normal rate and regular rhythm.      Heart sounds: Normal heart sounds.   Pulmonary:      Effort: Pulmonary effort is normal.      Breath sounds: Normal breath sounds.   Abdominal:      General: Bowel sounds are normal.      Palpations: Abdomen is soft.   Musculoskeletal:         General: Normal range of motion.      Cervical back: Normal range of motion and neck supple.   Skin:     General: Skin is warm and dry.   Neurological:      Mental Status: He is alert and oriented to person, place, and time.      Cranial Nerves: No cranial nerve deficit.   Psychiatric:         Behavior: Behavior normal.         Thought Content: Thought  content normal.         Judgment: Judgment normal.         Laboratory  Lab Results   Component Value Date    WBC 7.16 02/01/2022    HGB 12.9 (L) 02/01/2022    HCT 39.4 (L) 02/01/2022    MCV 83 02/01/2022     02/01/2022     Lab Results   Component Value Date    INR 1.2 01/28/2022    INR 1.0 11/30/2010     Lab Results   Component Value Date    HGBA1C 6.9 (H) 02/11/2022     No results for input(s): POCTGLUCOSE in the last 72 hours.    Diagnostic Results:      TRANSITION OF CARE:     [facility] Contact Note:     Family and/or Caretaker present at visit?  Yes.  Diagnostic tests reviewed/disposition: No diagnosic tests pending after this hospitalization.  Disease/illness education:   Home health/community services discussion/referrals: Patient does not have home health established from hospital visit.  They do not need home health.  If needed, we will set up home health for the patient.   Establishment or re-establishment of referral orders for community resources: No other necessary community resources.   Discussion with other health care providers: No discussion with other health care providers necessary.     Transition of Care Visit:     I have reviewed and updated the history and problem list.  I have reconciled the medication list.  I have discussed the hospitalization and current medical issues, prognosis and plans with the patient/family.  I  spent more than 50% of time discussing the care with the patient/family.  Total Face-to-Face Encounter: 60 minutes.    Medications Reconciliation:   I have reconciled the patient's home medications and discharge medications with the patient/family. I have updated all changes.  Refer to After-Visit Medication List.    ASSESSMENT & PLAN:     HIGH RISK CONDITION(S):      Norman was seen today for transitional care and shortness of breath.    Diagnoses and all orders for this visit:    Trigeminal neuralgia    COVID-19 virus infection  -     Ambulatory referral/consult  to [facility]    - Continue all medications, treatments and therapies as ordered.   - Follow all instructions, recommendations as discussed.  - Maintain Safety Precautions at all times.  - Attend all medical appointments as scheduled.  - For worsening symptoms: call Primary Care Physician or Nurse Practitioner.  - For emergencies, call 911 or immediately report to the nearest emergency room.  - Limit Risks of environmental exposure to coronavirus/COVID-19 as discussed including: social distancing, hand hygiene, and limiting departures from the home for necessities only.     Discussed parameters to wean off oxygen with daughter    Were controlled substances prescribed?  No        Scheduled Follow-up :  Future Appointments   Date Time Provider Department Center   2/24/2022  1:30 PM Nuvia Montoya MD [facility]   4/21/2022  1:40 PM Darshana Brandon MD [facility] FAM [facility]       After Visit Medication List :     Medication List          Accurate as of February 16, 2022 11:59 PM. If you have any questions, ask your nurse or doctor.            CONTINUE taking these medications    aspirin 81 MG EC tablet  Commonly known as: ECOTRIN  Take 1 tablet (81 mg total) by mouth once daily.     blood sugar diagnostic Strp  To check BG 2 times daily, to use with insurance preferred meter     blood-glucose meter kit  To check BG 2 times daily, to use with insurance preferred meter     clotrimazole 1 % Soln  Commonly known as: LOTRIMIN  Apply 4 drops to affected ear twice daily     fluticasone propionate 50 mcg/actuation nasal spray  Commonly known as: FLONASE  2 SPRAYS (100 MCG TOTAL) BY EACH NOSTRIL ROUTE ONCE DAILY.     gabapentin 800 MG tablet  Commonly known as: NEURONTIN  Take 0.5 tablets (400 mg total) by mouth 2 (two) times daily.     lancets Misc  To check BG 2 times daily, to use with insurance preferred meter     meclizine 12.5 mg tablet  Commonly known as: ANTIVERT  Take 1  tablet (12.5 mg total) by mouth 2 (two) times daily as needed for Dizziness.     megestroL 400 mg/10 mL (40 mg/mL) Susp  Commonly known as: MEGACE  Take 1 mL (40 mg total) by mouth once daily.     metFORMIN 500 MG ER 24hr tablet  Commonly known as: GLUCOPHAGE-XR  Take 1 tablet (500 mg total) by mouth daily with breakfast.     nabumetone 500 MG tablet  Commonly known as: RELAFEN  Take 1 tablet (500 mg total) by mouth 2 (two) times daily as needed for Pain.     omeprazole 20 MG capsule  Commonly known as: PRILOSEC  Take 1 capsule (20 mg total) by mouth once daily. Para el reflujo.     pulse oximeter device  Commonly known as: pulse oximeter  aplicar por vía externa 2 (dos) veces al día. Úselo dos veces al día, a las 8 AM y a las 3 PM, y registre el valor en MyChart según las indicaciones.  (by Apply Externally route 2 (two) times a day. Use twice daily at 8 AM and 3 PM and record the value in MyChart as directed.)     VITAMIN C 500 MG tablet  Generic drug: ascorbic acid (vitamin C)  Bowlegs krzysztof tableta (500 mg en total) por vía oral 2 veces al día.  (Take 1 tablet (500 mg total) by mouth 2 (two) times daily.)            Signature:  Yelena Farfan NP    Patient consent obtained prior to treatment

## 2022-02-22 ENCOUNTER — PATIENT OUTREACH (OUTPATIENT)
Dept: ADMINISTRATIVE | Facility: OTHER | Age: 87
End: 2022-02-22
Payer: COMMERCIAL

## 2022-02-22 NOTE — PROGRESS NOTES
Health Maintenance Due   Topic Date Due    Foot Exam  Never done    TETANUS VACCINE  Never done    Shingles Vaccine (1 of 2) Never done    COVID-19 Vaccine (3 - Booster for Pfizer series) 01/26/2022     Updates were requested from care everywhere.  Chart was reviewed for overdue Proactive [facility] Encounters (LIZZY) topics (CRS, Breast Cancer Screening, Eye exam)  Health Maintenance has been updated.  LINKS immunization registry triggered.  Immunizations were reconciled.

## 2022-02-24 ENCOUNTER — OFFICE VISIT (OUTPATIENT)
Dept: PAIN MEDICINE | Facility: CLINIC | Age: 87
End: 2022-02-24
Payer: COMMERCIAL

## 2022-02-24 ENCOUNTER — HOSPITAL ENCOUNTER (OUTPATIENT)
Dept: RADIOLOGY | Facility: HOSPITAL | Age: 87
Discharge: HOME | End: 2022-02-24
Attending: PHYSICAL MEDICINE & REHABILITATION
Payer: COMMERCIAL

## 2022-02-24 VITALS — SYSTOLIC BLOOD PRESSURE: 125 MMHG | HEART RATE: 77 BPM | DIASTOLIC BLOOD PRESSURE: 83 MMHG

## 2022-02-24 DIAGNOSIS — Z98.890 HISTORY OF LUMBAR SURGERY: ICD-10-CM

## 2022-02-24 DIAGNOSIS — G89.29 CHRONIC BILATERAL LOW BACK PAIN WITHOUT SCIATICA: Primary | ICD-10-CM

## 2022-02-24 DIAGNOSIS — G50.0 TRIGEMINAL NEURALGIA: ICD-10-CM

## 2022-02-24 DIAGNOSIS — R29.898 BILATERAL LEG WEAKNESS: ICD-10-CM

## 2022-02-24 DIAGNOSIS — G89.29 CHRONIC BILATERAL LOW BACK PAIN WITHOUT SCIATICA: ICD-10-CM

## 2022-02-24 DIAGNOSIS — M54.50 CHRONIC BILATERAL LOW BACK PAIN WITHOUT SCIATICA: ICD-10-CM

## 2022-02-24 DIAGNOSIS — M53.86 DECREASED RANGE OF MOTION OF LUMBAR SPINE: ICD-10-CM

## 2022-02-24 DIAGNOSIS — M54.50 CHRONIC BILATERAL LOW BACK PAIN WITHOUT SCIATICA: Primary | ICD-10-CM

## 2022-02-24 PROCEDURE — 99204 OFFICE O/P NEW MOD 45 MIN: CPT | Mod: S$PBB,,, | Performed by: PHYSICAL MEDICINE & REHABILITATION

## 2022-02-24 PROCEDURE — 99999 PR PBB SHADOW E&M-EST. PATIENT-LVL IV: ICD-10-PCS | Mod: PBBFAC,,, | Performed by: PHYSICAL MEDICINE & REHABILITATION

## 2022-02-24 PROCEDURE — 72100 XR LUMBAR SPINE AP AND LATERAL: ICD-10-PCS | Mod: 26,,, | Performed by: RADIOLOGY

## 2022-02-24 PROCEDURE — 72100 X-RAY EXAM L-S SPINE 2/3 VWS: CPT | Mod: TC,FY

## 2022-02-24 PROCEDURE — 99214 OFFICE O/P EST MOD 30 MIN: CPT | Mod: PBBFAC,PO | Performed by: PHYSICAL MEDICINE & REHABILITATION

## 2022-02-24 PROCEDURE — 99204 PR OFFICE/OUTPT VISIT, NEW, LEVL IV, 45-59 MIN: ICD-10-PCS | Mod: S$PBB,,, | Performed by: PHYSICAL MEDICINE & REHABILITATION

## 2022-02-24 PROCEDURE — 99999 PR PBB SHADOW E&M-EST. PATIENT-LVL IV: CPT | Mod: PBBFAC,,, | Performed by: PHYSICAL MEDICINE & REHABILITATION

## 2022-02-24 PROCEDURE — 72100 X-RAY EXAM L-S SPINE 2/3 VWS: CPT | Mod: 26,,, | Performed by: RADIOLOGY

## 2022-02-24 RX ORDER — OXCARBAZEPINE 150 MG/1
150 TABLET, FILM COATED ORAL 2 TIMES DAILY
Qty: 60 TABLET | Refills: 11 | Status: SHIPPED | OUTPATIENT
Start: 2022-02-24 | End: 2022-04-26

## 2022-02-24 NOTE — SUBJECTIVE & OBJECTIVE
Past Medical History:   Diagnosis Date    Cataract     COVID-19 01/27/2022    GERD (gastroesophageal reflux disease)     Trigeminal neuralgia     Type 2 diabetes mellitus with hyperglycemia, without long-term current use of insulin 5/26/2021       Past Surgical History:   Procedure Laterality Date    CATARACT EXTRACTION         Review of patient's allergies indicates:  No Known Allergies    No current facility-administered medications on file prior to encounter.     Current Outpatient Medications on File Prior to Encounter   Medication Sig    gabapentin (NEURONTIN) 800 MG tablet Take 0.5 tablets (400 mg total) by mouth 2 (two) times daily.    meclizine (ANTIVERT) 12.5 mg tablet Take 1 tablet (12.5 mg total) by mouth 2 (two) times daily as needed for Dizziness.    metFORMIN (GLUCOPHAGE-XR) 500 MG ER 24hr tablet Take 1 tablet (500 mg total) by mouth daily with breakfast.    omeprazole (PRILOSEC) 20 MG capsule Take 1 capsule (20 mg total) by mouth once daily. Para el reflujo.    fluticasone propionate (FLONASE) 50 mcg/actuation nasal spray 2 SPRAYS (100 MCG TOTAL) BY EACH NOSTRIL ROUTE ONCE DAILY.     Family History    None       Tobacco Use    Smoking status: Never Smoker    Smokeless tobacco: Never Used   Substance and Sexual Activity    Alcohol use: No    Drug use: No    Sexual activity: Not on file     Review of Systems   Constitutional:  Positive for fatigue. Negative for chills, diaphoresis and fever.   HENT:  Negative for hearing loss and sore throat.    Eyes:  Negative for visual disturbance.   Respiratory:  Positive for cough. Negative for shortness of breath.    Cardiovascular:  Negative for chest pain and leg swelling.   Gastrointestinal:  Negative for abdominal pain, diarrhea, nausea and vomiting.   Genitourinary:  Negative for difficulty urinating and flank pain.   Musculoskeletal:  Negative for back pain.   Skin:  Negative for rash and wound.   Neurological:  Negative for dizziness, weakness and  headaches.   Psychiatric/Behavioral:  Negative for agitation and confusion.    Objective:     Vital Signs (Most Recent):  Temp: 97.7 °F (36.5 °C) (02/02/22 1056)  Pulse: 65 (02/02/22 1153)  Resp: 18 (02/02/22 1056)  BP: 126/62 (02/02/22 1056)  SpO2: (!) 93 % (02/02/22 1056)   Vital Signs (24h Range):        Weight: 71.6 kg (157 lb 13.6 oz)  Body mass index is 25.48 kg/m².    Physical Exam  Vitals and nursing note reviewed.   Constitutional:       General: He is not in acute distress.     Appearance: Normal appearance. He is not ill-appearing.   HENT:      Head: Normocephalic and atraumatic.      Mouth/Throat:      Mouth: Mucous membranes are moist.   Eyes:      Extraocular Movements: Extraocular movements intact.      Pupils: Pupils are equal, round, and reactive to light.   Cardiovascular:      Rate and Rhythm: Normal rate and regular rhythm.      Pulses: Normal pulses.      Heart sounds: Normal heart sounds. No murmur heard.    No friction rub. No gallop.   Pulmonary:      Effort: Pulmonary effort is normal. No respiratory distress.      Breath sounds: Examination of the right-lower field reveals decreased breath sounds. Examination of the left-lower field reveals decreased breath sounds. Decreased breath sounds present. No wheezing or rhonchi.   Abdominal:      General: Bowel sounds are normal. There is no distension.      Palpations: Abdomen is soft.      Tenderness: There is no abdominal tenderness. There is no guarding.   Musculoskeletal:         General: No swelling.      Cervical back: Normal range of motion and neck supple.      Right lower leg: No edema.      Left lower leg: No edema.   Skin:     General: Skin is warm and dry.      Capillary Refill: Capillary refill takes less than 2 seconds.      Findings: No bruising, erythema or rash.   Neurological:      General: No focal deficit present.      Mental Status: He is alert and oriented to person, place, and time.   Psychiatric:         Mood and Affect:  Mood normal.         Behavior: Behavior normal. Behavior is cooperative.         CRANIAL NERVES     CN III, IV, VI   Pupils are equal, round, and reactive to light.     Significant Labs: All pertinent labs within the past 24 hours have been reviewed.        Significant Imaging: I have reviewed all pertinent imaging results/findings within the past 24 hours.

## 2022-02-24 NOTE — PROGRESS NOTES
[facility]  New Patient H&P    Referring Provider: Darshana Brandon Md  [address] [facility] [address]  [place] [address]    Chief Complaint:   Chief Complaint   Patient presents with    Facial Pain    Low-back Pain       History of Present Illness: Norman Saunders is a 89 y.o. male referred by Dr. Darshana Brandon for trigeminal neuralgia.      Onset: years  Location: left upper jaw  Radiation: in the jaw/face  Timing: intermittent, occurs for 2-3 minutes  Quality: Sharp, ice-pick  Exacerbating Factors: eating hot or cold, brushing  Alleviating Factors: heat, pressure over face  Associated Symptoms: No eye redness, tearing, congestion, facial weakness, or vision changes, trouble swallowing.     Back Pain has been present for 3 months since a fall. He had a prior lumbar surgery in the 1970s. Pain is localized to the bilateral lumbar spine with no radiation into the legs. Pain is described as aching and tightness. The pain is aggravated by standing. The pain is alleviated by sitting. He notes weakness and numbness in both his legs. Denies changes in bowel or bladder function. Denies saddle anesthesia. Denies recent fevers or infections. Denies significant weight loss    Severity: Currently: 7/10   Typical Range: 0-7/10     Exacerbation: 7/10          Previous Interventions:  -     Previous Therapies:  PT/OT: no   Relevant Surgery: yes    - Lumbar surgery in the 1970s  Previous Medications:   - NSAIDS: relafen  - Muscle Relaxants:    - TCAs:   - SNRIs:   - Topicals:   - Anticonvulsants: gabapentin   - Opioids:     Current Pain Medications:  1.  Relafen    Blood Thinners: ASA 81mg    Full Medication List:    Current Outpatient Medications:     ascorbic acid, vitamin C, (VITAMIN C) 500 MG tablet, Take 1 tablet (500 mg total) by mouth 2 (two) times daily., Disp: 30 tablet, Rfl: 0    aspirin (ECOTRIN) 81 MG EC tablet, Take 1 tablet (81 mg total) by mouth once daily., Disp: 30 tablet, Rfl: 11    blood sugar diagnostic  Strp, To check BG 2 times daily, to use with insurance preferred meter, Disp: 100 strip, Rfl: 11    blood-glucose meter kit, To check BG 2 times daily, to use with insurance preferred meter, Disp: 1 each, Rfl: 5    clotrimazole (LOTRIMIN) 1 % Soln, Apply 4 drops to affected ear twice daily, Disp: 10 mL, Rfl: 2    fluticasone propionate (FLONASE) 50 mcg/actuation nasal spray, 2 SPRAYS (100 MCG TOTAL) BY EACH NOSTRIL ROUTE ONCE DAILY., Disp: 48 mL, Rfl: 1    lancets Misc, To check BG 2 times daily, to use with insurance preferred meter, Disp: 100 each, Rfl: 11    meclizine (ANTIVERT) 12.5 mg tablet, Take 1 tablet (12.5 mg total) by mouth 2 (two) times daily as needed for Dizziness., Disp: 60 tablet, Rfl: 6    megestroL (MEGACE) 400 mg/10 mL (40 mg/mL) Susp, Take 1 mL (40 mg total) by mouth once daily., Disp: 200 mL, Rfl: 2    metFORMIN (GLUCOPHAGE-XR) 500 MG ER 24hr tablet, Take 1 tablet (500 mg total) by mouth daily with breakfast., Disp: 90 tablet, Rfl: 3    nabumetone (RELAFEN) 500 MG tablet, Take 1 tablet (500 mg total) by mouth 2 (two) times daily as needed for Pain., Disp: 30 tablet, Rfl: 0    omeprazole (PRILOSEC) 20 MG capsule, Take 1 capsule (20 mg total) by mouth once daily. Para el reflujo., Disp: 90 capsule, Rfl: 3    pulse oximeter (PULSE OXIMETER) device, by Apply Externally route 2 (two) times a day. Use twice daily at 8 AM and 3 PM and [facility] the [facility] in [facility] as directed., Disp: 1 each, Rfl: 0    OXcarbazepine (TRILEPTAL) 150 MG Tab, Take 1 tablet (150 mg total) by mouth 2 (two) times daily., Disp: 60 tablet, Rfl: 11     Review of Systems:  ROS    Allergies:  Patient has no known allergies.     Medical History:   has a past medical history of Cataract, COVID-19 (01/27/2022), GERD (gastroesophageal reflux disease), Trigeminal neuralgia, and Type 2 diabetes mellitus with hyperglycemia, without long-term current use of insulin (5/26/2021).    Surgical History:   has a past surgical history  that includes Cataract extraction.    Family History:  family history includes No Known Problems in his father and mother.    Social History:   reports that he has never smoked. He has never used smokeless tobacco. He reports that he does not drink alcohol and does not use drugs.    Physical Exam:  /83   Pulse 77   GEN: No acute distress. Calm, comfortable  HENT: Normocephalic, atraumatic, moist mucous membranes  EYE: Anicteric sclera, non-injected.   CV: Non-diaphoretic. Regular Rate. Radial Pulses 2+.  RESP: Breathing comfortably. Chest expansion symmetric.  EXT: No clubbing, cyanosis.   SKIN: Warm, & dry to palpation. No visible rashes or lesions of exposed skin.   PSYCH: Pleasant mood and appropriate affect. Recent and remote memory intact.   GAIT: Independent, antalgic ambulation  Lumbar Spine Exam:       Inspection: No erythema, bruising. Kyphotic posture      Palpation: (-) TTP of lumbar paraspinals b/l       ROM:  Limited in flexion, extension, lateral bending.       (+) Facet loading bilaterally      (-) Straight Leg Raise bilaterally      (+) DARRYL bilaterally  Hip Exam:      Inspection: No gross deformity or apparent leg length discrepancy      Palpation: No TTP to bilateral greater trochanteric bursas.       ROM:  No pain in internal rotation, external rotation b/l  Neurologic Exam:     Alert. Speech is fluent and appropriate.     Cranial Nerves: Extra-ocular movements intact. Pupils equal. No strabismus. Face Symmetric. Jaw opens midline. Uvula midline. Tongue midline. Shoulder shrug symmetric.       Strength: 4/5 in bilateral hip flexion, left knee extension, otherwise 5/5 throughout bilateral upper & lower extremities     Sensation: Grossly intact to light touch in bilateral upper & lower extremities     Reflexes: 2+ in b/l patella, 1+ in b/l achilles, biceps, brachioradialis, triceps     Tone: No abnormality appreciated      No Clonus                Imaging:  - X-ray lumbar spine  2/24/22:  Bony structures appear in tact with generalized spondylosis with marginal osteophytes and disc space narrowing throughout.  Alignment is maintained.  Pedicles demonstrate no destruction    - MRI Brain w/o contrast 5/16/17:  1.  Noncontrast MRI demonstrates no evidence of acute intracranial pathology.  No significant abnormality to correlate with patient's clinical history of trigeminal neuralgia.  2.  Age-appropriate degenerative volume loss with mild degree of supratentorial and periventricular white matter changes suggestive of chronic microvascular ischemic disease.      Labs:  BMP  Lab Results   Component Value Date     02/11/2022    K 4.2 02/11/2022     02/11/2022    CO2 23 02/11/2022    BUN 12 02/11/2022    CREATININE 1.1 02/11/2022    CALCIUM 9.8 02/11/2022    ANIONGAP 11 02/11/2022    ESTGFRAFRICA >60 02/11/2022    EGFRNONAA 59 (A) 02/11/2022     Lab Results   Component Value Date    ALT 20 02/11/2022    AST 18 02/11/2022    ALKPHOS 76 02/11/2022    BILITOT 0.5 02/11/2022     Lab Results   Component Value Date     02/01/2022       Assessment:  Norman Saunders is a 89 y.o. male with the following diagnoses based on history, exam, and imaging:    Problem List Items Addressed This Visit        Neuro    Trigeminal neuralgia    Relevant Medications    OXcarbazepine (TRILEPTAL) 150 MG Tab       Orthopedic    Low back pain - Primary    Relevant Orders    Ambulatory referral/consult to Physical/Occupational Therapy    X-Ray Lumbar Spine AP And Lateral (Completed)      Other Visit Diagnoses     History of lumbar surgery        Relevant Orders    X-Ray Lumbar Spine AP And Lateral (Completed)    Bilateral leg weakness        Relevant Orders    Ambulatory referral/consult to Physical/Occupational Therapy    Decreased range of motion of lumbar spine        Relevant Orders    Ambulatory referral/consult to Physical/Occupational Therapy          This is a pleasant 89 y.o. gentleman presenting  with:     - Trigeminal Neuralgia on the Left: Classic presentation  - Chronic bilateral low back pain: Prior lumbar surgery, but not sure of details. Pain appears primarily facetogenic, but some aspects of LSS with claudication.   - Comorbidities: DM2. GERD. MDD. Vertigo.     Treatment Plan:   - PT/OT/HEP: Refer to PT for back pain. Discussed benefits of exercise for pain.   - Procedures: Consider trigeminal nerve block, V2 branch on the left if no relief with trileptal  - Medications: Trial oxcarbazepine 150 mg BID. Start nightly and increase to BID if no relief. Reviewed CBC and CMP.  - Imaging: Reviewed. Order x-ray of lumbar spine.   - Labs: Reviewed.  Medications are appropriately dosed for current hepatorenal function.   - Plan to update CBC and CMP after being on oxcarbazepine for 1-2 months    Follow Up: RTC in 6-8 weeks to assess trileptal    Nuvia Montoya M.D.  Interventional Pain Medicine / Physical Medicine & Rehabilitation    Disclaimer: This note was partly generated using dictation software which may occasionally result in transcription errors.

## 2022-03-24 ENCOUNTER — PATIENT MESSAGE (OUTPATIENT)
Dept: FAMILY MEDICINE | Facility: CLINIC | Age: 87
End: 2022-03-24
Payer: COMMERCIAL

## 2022-03-24 DIAGNOSIS — R35.0 FREQUENCY OF URINATION: Primary | ICD-10-CM

## 2022-03-24 RX ORDER — TAMSULOSIN HYDROCHLORIDE 0.4 MG/1
0.4 CAPSULE ORAL DAILY
Qty: 30 CAPSULE | Refills: 11 | Status: SHIPPED | OUTPATIENT
Start: 2022-03-24 | End: 2022-04-26

## 2022-03-30 ENCOUNTER — PATIENT OUTREACH (OUTPATIENT)
Dept: ADMINISTRATIVE | Facility: OTHER | Age: 87
End: 2022-03-30
Payer: COMMERCIAL

## 2022-03-31 ENCOUNTER — TELEPHONE (OUTPATIENT)
Dept: PAIN MEDICINE | Facility: CLINIC | Age: 87
End: 2022-03-31
Payer: COMMERCIAL

## 2022-03-31 NOTE — TELEPHONE ENCOUNTER
Contacted patient per MD-   To reschedule his missed appt from today.     In-person, if that works better for patient.     Left vm to call back     ER

## 2022-04-04 ENCOUNTER — TELEPHONE (OUTPATIENT)
Dept: FAMILY MEDICINE | Facility: CLINIC | Age: 87
End: 2022-04-04
Payer: COMMERCIAL

## 2022-04-13 DIAGNOSIS — K21.00 GASTROESOPHAGEAL REFLUX DISEASE WITH ESOPHAGITIS WITHOUT HEMORRHAGE: ICD-10-CM

## 2022-04-13 RX ORDER — OMEPRAZOLE 20 MG/1
20 CAPSULE, DELAYED RELEASE ORAL DAILY
Qty: 90 CAPSULE | Refills: 3 | Status: SHIPPED | OUTPATIENT
Start: 2022-04-13 | End: 2023-05-17

## 2022-04-13 RX ORDER — OMEPRAZOLE 20 MG/1
20 CAPSULE, DELAYED RELEASE ORAL DAILY
Qty: 90 CAPSULE | Refills: 3 | Status: SHIPPED | OUTPATIENT
Start: 2022-04-13 | End: 2022-04-13 | Stop reason: SDUPTHER

## 2022-04-13 NOTE — TELEPHONE ENCOUNTER
No new care gaps identified.  Powered by [email or web address]. Reference number: [id number].   4/13/2022 3:53:26 PM CDT

## 2022-04-13 NOTE — TELEPHONE ENCOUNTER
No new care gaps identified.  Powered by [employer]. Reference number: [id number].   4/13/2022 3:37:16 PM CDT

## 2022-04-13 NOTE — TELEPHONE ENCOUNTER
Refill Authorization Note   Normanjade Saunders  is requesting a refill authorization.  Brief Assessment and Rationale for Refill:  Approve     Medication Therapy Plan:       Medication Reconciliation Completed: No   Comments:     No Care Gaps recommended.     Note composed:4:57 PM 04/13/2022

## 2022-04-18 ENCOUNTER — PATIENT MESSAGE (OUTPATIENT)
Dept: FAMILY MEDICINE | Facility: CLINIC | Age: 87
End: 2022-04-18
Payer: COMMERCIAL

## 2022-04-18 DIAGNOSIS — H91.90 HEARING DIFFICULTY, UNSPECIFIED LATERALITY: Primary | ICD-10-CM

## 2022-04-20 ENCOUNTER — TELEPHONE (OUTPATIENT)
Dept: FAMILY MEDICINE | Facility: CLINIC | Age: 87
End: 2022-04-20
Payer: COMMERCIAL

## 2022-04-21 ENCOUNTER — OFFICE VISIT (OUTPATIENT)
Dept: FAMILY MEDICINE | Facility: CLINIC | Age: 87
End: 2022-04-21
Payer: COMMERCIAL

## 2022-04-21 ENCOUNTER — TELEPHONE (OUTPATIENT)
Dept: FAMILY MEDICINE | Facility: CLINIC | Age: 87
End: 2022-04-21
Payer: COMMERCIAL

## 2022-04-21 ENCOUNTER — LAB VISIT (OUTPATIENT)
Dept: LAB | Facility: HOSPITAL | Age: 87
End: 2022-04-21
Attending: INTERNAL MEDICINE
Payer: COMMERCIAL

## 2022-04-21 VITALS
WEIGHT: 166.88 LBS | HEIGHT: 66 IN | SYSTOLIC BLOOD PRESSURE: 122 MMHG | DIASTOLIC BLOOD PRESSURE: 68 MMHG | BODY MASS INDEX: 26.82 KG/M2 | OXYGEN SATURATION: 97 % | HEART RATE: 93 BPM | TEMPERATURE: 99 F

## 2022-04-21 DIAGNOSIS — Z00.00 WELLNESS EXAMINATION: Primary | ICD-10-CM

## 2022-04-21 DIAGNOSIS — E78.5 HYPERLIPIDEMIA, UNSPECIFIED HYPERLIPIDEMIA TYPE: ICD-10-CM

## 2022-04-21 DIAGNOSIS — G50.0 TRIGEMINAL NEURALGIA: ICD-10-CM

## 2022-04-21 DIAGNOSIS — E11.65 TYPE 2 DIABETES MELLITUS WITH HYPERGLYCEMIA, WITHOUT LONG-TERM CURRENT USE OF INSULIN: ICD-10-CM

## 2022-04-21 DIAGNOSIS — F32.A DEPRESSION, UNSPECIFIED DEPRESSION TYPE: ICD-10-CM

## 2022-04-21 DIAGNOSIS — H91.90 HEARING DIFFICULTY, UNSPECIFIED LATERALITY: ICD-10-CM

## 2022-04-21 LAB
ALBUMIN SERPL BCP-MCNC: 4.2 G/DL (ref 3.5–5.2)
ALP SERPL-CCNC: 68 U/L (ref 55–135)
ALT SERPL W/O P-5'-P-CCNC: 15 U/L (ref 10–44)
ANION GAP SERPL CALC-SCNC: 12 MMOL/L (ref 8–16)
AST SERPL-CCNC: 17 U/L (ref 10–40)
BASOPHILS # BLD AUTO: 0.04 K/UL (ref 0–0.2)
BASOPHILS NFR BLD: 0.6 % (ref 0–1.9)
BILIRUB SERPL-MCNC: 0.5 MG/DL (ref 0.1–1)
BUN SERPL-MCNC: 12 MG/DL (ref 8–23)
CALCIUM SERPL-MCNC: 9.7 MG/DL (ref 8.7–10.5)
CHLORIDE SERPL-SCNC: 105 MMOL/L (ref 95–110)
CHOLEST SERPL-MCNC: 224 MG/DL (ref 120–199)
CHOLEST/HDLC SERPL: 4.7 {RATIO} (ref 2–5)
CO2 SERPL-SCNC: 24 MMOL/L (ref 23–29)
CREAT SERPL-MCNC: 1.1 MG/DL (ref 0.5–1.4)
DIFFERENTIAL METHOD: ABNORMAL
EOSINOPHIL # BLD AUTO: 0.2 K/UL (ref 0–0.5)
EOSINOPHIL NFR BLD: 2.6 % (ref 0–8)
ERYTHROCYTE [DISTWIDTH] IN BLOOD BY AUTOMATED COUNT: 15 % (ref 11.5–14.5)
EST. GFR  (AFRICAN AMERICAN): >60 ML/MIN/1.73 M^2
EST. GFR  (NON AFRICAN AMERICAN): 59 ML/MIN/1.73 M^2
ESTIMATED AVG GLUCOSE: 143 MG/DL (ref 68–131)
GLUCOSE SERPL-MCNC: 125 MG/DL (ref 70–110)
HBA1C MFR BLD: 6.6 % (ref 4–5.6)
HCT VFR BLD AUTO: 42.1 % (ref 40–54)
HDLC SERPL-MCNC: 48 MG/DL (ref 40–75)
HDLC SERPL: 21.4 % (ref 20–50)
HGB BLD-MCNC: 14.1 G/DL (ref 14–18)
IMM GRANULOCYTES # BLD AUTO: 0.01 K/UL (ref 0–0.04)
IMM GRANULOCYTES NFR BLD AUTO: 0.2 % (ref 0–0.5)
LDLC SERPL CALC-MCNC: 150.4 MG/DL (ref 63–159)
LYMPHOCYTES # BLD AUTO: 1.7 K/UL (ref 1–4.8)
LYMPHOCYTES NFR BLD: 27.6 % (ref 18–48)
MCH RBC QN AUTO: 28.7 PG (ref 27–31)
MCHC RBC AUTO-ENTMCNC: 33.5 G/DL (ref 32–36)
MCV RBC AUTO: 86 FL (ref 82–98)
MONOCYTES # BLD AUTO: 0.4 K/UL (ref 0.3–1)
MONOCYTES NFR BLD: 6.6 % (ref 4–15)
NEUTROPHILS # BLD AUTO: 3.9 K/UL (ref 1.8–7.7)
NEUTROPHILS NFR BLD: 62.4 % (ref 38–73)
NONHDLC SERPL-MCNC: 176 MG/DL
NRBC BLD-RTO: 0 /100 WBC
PLATELET # BLD AUTO: 218 K/UL (ref 150–450)
PMV BLD AUTO: 11.1 FL (ref 9.2–12.9)
POTASSIUM SERPL-SCNC: 4.1 MMOL/L (ref 3.5–5.1)
PROT SERPL-MCNC: 8 G/DL (ref 6–8.4)
RBC # BLD AUTO: 4.92 M/UL (ref 4.6–6.2)
SODIUM SERPL-SCNC: 141 MMOL/L (ref 136–145)
TRIGL SERPL-MCNC: 128 MG/DL (ref 30–150)
TSH SERPL DL<=0.005 MIU/L-ACNC: 1.7 UIU/ML (ref 0.4–4)
WBC # BLD AUTO: 6.24 K/UL (ref 3.9–12.7)

## 2022-04-21 PROCEDURE — 99999 PR PBB SHADOW E&M-EST. PATIENT-LVL V: ICD-10-PCS | Mod: PBBFAC,,, | Performed by: INTERNAL MEDICINE

## 2022-04-21 PROCEDURE — 80061 LIPID PANEL: CPT | Performed by: INTERNAL MEDICINE

## 2022-04-21 PROCEDURE — 80053 COMPREHEN METABOLIC PANEL: CPT | Performed by: INTERNAL MEDICINE

## 2022-04-21 PROCEDURE — 36415 COLL VENOUS BLD VENIPUNCTURE: CPT | Performed by: INTERNAL MEDICINE

## 2022-04-21 PROCEDURE — 99999 PR PBB SHADOW E&M-EST. PATIENT-LVL V: CPT | Mod: PBBFAC,,, | Performed by: INTERNAL MEDICINE

## 2022-04-21 PROCEDURE — 99397 PR PREVENTIVE VISIT,EST,65 & OVER: ICD-10-PCS | Mod: S$GLB,,, | Performed by: INTERNAL MEDICINE

## 2022-04-21 PROCEDURE — 99397 PER PM REEVAL EST PAT 65+ YR: CPT | Mod: S$GLB,,, | Performed by: INTERNAL MEDICINE

## 2022-04-21 PROCEDURE — 83036 HEMOGLOBIN GLYCOSYLATED A1C: CPT | Performed by: INTERNAL MEDICINE

## 2022-04-21 PROCEDURE — 84443 ASSAY THYROID STIM HORMONE: CPT | Performed by: INTERNAL MEDICINE

## 2022-04-21 PROCEDURE — 85025 COMPLETE CBC W/AUTO DIFF WBC: CPT | Performed by: INTERNAL MEDICINE

## 2022-04-21 NOTE — PROGRESS NOTES
Subjective:       Patient ID: Norman Saunders is a 89 y.o. male.    Chief Complaint: Follow-up (4 week )      HPI  Norman Saunders is a 89 y.o. male with chronic conditions of diabetes mellitus type 2, depression, trigeminal neuralgia, GERD  who presents today for health maintenance visit.    Reports he feels better. Appetite medication has helped. Sleeps ok and mood is better.    Flomax has helped his nocturia and reports no trouble urinating. Has not seen urologist.    Trigeminal neuralgia is stable. Takes Tylenol as needed and helps. Complains of decreased hearing, no earache, no discharge.    Labs show no anemia and normal blood counts, stable renal and liver function, normal TSH. A1 C is better the fasting glucose is elevated. Cholesterol also increased. Reports he has been walking for activity.    Health Maintenance:  Health Maintenance   Topic Date Due    Foot Exam  Never done    TETANUS VACCINE  Never done    Hemoglobin A1c  08/11/2022    Lipid Panel  08/20/2022    Eye Exam  08/24/2022       Review of Systems   Constitutional: Negative for chills and fever.   HENT: Negative for ear discharge.    Respiratory: Negative for cough.    Musculoskeletal: Positive for back pain.   Neurological: Negative.    Psychiatric/Behavioral: Negative.       Past Medical History:   Diagnosis Date    Cataract     COVID-19 01/27/2022    GERD (gastroesophageal reflux disease)     Trigeminal neuralgia     Type 2 diabetes mellitus with hyperglycemia, without long-term current use of insulin 5/26/2021       Past Surgical History:   Procedure Laterality Date    CATARACT EXTRACTION         Family History   Problem Relation Age of Onset    No Known Problems Mother     No Known Problems Father     Amblyopia Neg Hx     Blindness Neg Hx     Cancer Neg Hx     Cataracts Neg Hx     Diabetes Neg Hx     Glaucoma Neg Hx     Hypertension Neg Hx     Macular degeneration Neg Hx     Retinal detachment Neg Hx     Strabismus Neg  Hx     Stroke Neg Hx     Thyroid disease Neg Hx        Social History     Socioeconomic History    Marital status:    Tobacco Use    Smoking status: Never Smoker    Smokeless tobacco: Never Used   Substance and Sexual Activity    Alcohol use: No    Drug use: No       Current Outpatient Medications   Medication Sig Dispense Refill    ascorbic acid, vitamin C, (VITAMIN C) 500 MG tablet Take 1 tablet (500 mg total) by mouth 2 (two) times daily. 30 tablet 0    aspirin (ECOTRIN) 81 MG EC tablet Take 1 tablet (81 mg total) by mouth once daily. 30 tablet 11    blood sugar diagnostic Strp To check BG 2 times daily, to use with insurance preferred meter 100 strip 11    blood-glucose meter kit To check BG 2 times daily, to use with insurance preferred meter 1 each 5    clotrimazole (LOTRIMIN) 1 % Soln Apply 4 drops to affected ear twice daily 10 mL 2    lancets Misc To check BG 2 times daily, to use with insurance preferred meter 100 each 11    metFORMIN (GLUCOPHAGE-XR) 500 MG ER 24hr tablet TOME JAK TABLETA TODOS LOS SWENSON CON EL DESAYUNO 90 tablet 3    omeprazole (PRILOSEC) 20 MG capsule Take 1 capsule (20 mg total) by mouth once daily. Para el reflujo. 90 capsule 3    fluticasone propionate (FLONASE) 50 mcg/actuation nasal spray 2 SPRAYS (100 MCG TOTAL) BY EACH NOSTRIL ROUTE ONCE DAILY. (Patient not taking: Reported on 4/21/2022) 48 mL 1    meclizine (ANTIVERT) 12.5 mg tablet Take 1 tablet (12.5 mg total) by mouth 2 (two) times daily as needed for Dizziness. (Patient not taking: Reported on 4/21/2022) 60 tablet 6    megestroL (MEGACE) 400 mg/10 mL (40 mg/mL) Susp Take 1 mL (40 mg total) by mouth once daily. 200 mL 2    OXcarbazepine (TRILEPTAL) 150 MG Tab Take 1 tablet (150 mg total) by mouth 2 (two) times daily. (Patient not taking: Reported on 4/21/2022) 60 tablet 11    tamsulosin (FLOMAX) 0.4 mg Cap Take 1 capsule (0.4 mg total) by mouth once daily. (Patient not taking: Reported on 4/21/2022)  30 capsule 11     No current facility-administered medications for this visit.       Review of patient's allergies indicates:  No Known Allergies      Objective:       Last 3 sets of Vitals    Vitals - 1 value per visit 2/24/2022 4/21/2022 4/21/2022   SYSTOLIC 125 - 122   DIASTOLIC 83 - 68   Pulse 77 - 93   Temp - - 98.8   Resp - - -   SPO2 - - 97   Weight (lb) - - 166.89   Weight (kg) - - 75.7   Height - - 66   BMI (Calculated) - - 26.9   VISIT REPORT - - -   Pain Score  - 0 -   Physical Exam  Constitutional:       General: He is not in acute distress.     Appearance: Normal appearance.      Comments: frail   HENT:      Head: Normocephalic.      Ears:      Comments: Wax in both ears     Nose: Nose normal.      Mouth/Throat:      Mouth: Mucous membranes are moist.   Eyes:      General: No scleral icterus.     Extraocular Movements: Extraocular movements intact.      Conjunctiva/sclera: Conjunctivae normal.   Neck:      Vascular: No carotid bruit.      Comments: No goiter.  Cardiovascular:      Rate and Rhythm: Normal rate and regular rhythm.      Pulses: Normal pulses.      Heart sounds: Normal heart sounds.   Pulmonary:      Effort: Pulmonary effort is normal.      Breath sounds: Normal breath sounds.   Abdominal:      General: Bowel sounds are normal. There is no distension.      Palpations: Abdomen is soft. There is no mass.      Tenderness: There is no abdominal tenderness.   Musculoskeletal:         General: No swelling. Normal range of motion.   Lymphadenopathy:      Cervical: No cervical adenopathy.   Skin:     General: Skin is warm and dry.   Neurological:      General: No focal deficit present.      Mental Status: He is alert and oriented to person, place, and time.   Psychiatric:         Mood and Affect: Mood normal.         Behavior: Behavior normal.       Protective Sensation (w/ 10 gram monofilament):  Right: Intact  Left: Intact    Visual Inspection:  Dry Skin -  Bilateral    Pedal Pulses:   Right:  Present  Left: Present    Posterior tibialis:   Right:Present  Left: Present      CBC:  Recent Labs   Lab 01/28/22  0401 01/29/22  0607 02/01/22  0353   WBC 6.24 8.41 7.16   RBC 4.49 L 4.70 4.76   Hemoglobin 12.3 L 12.5 L 12.9 L   Hematocrit 37.4 L 39.0 L 39.4 L   Platelets 324 399 404   MCV 83 83 83   MCH 27.4 26.6 L 27.1   MCHC 32.9 32.1 32.7     CMP:  Recent Labs   Lab 01/29/22  0607 01/30/22  0359 02/01/22  0353 02/11/22  1255   Glucose 140 H   < > 114 H 117 H   Calcium 9.6   < > 8.7 9.8   Albumin 2.7 L  --  2.9 L 3.1 L   Total Protein 7.1  --  6.2 7.6   Sodium 141   < > 141 137   Potassium 3.7   < > 4.2 4.2   CO2 23   < > 24 23   Chloride 107   < > 106 103   BUN 30 H   < > 27 H 12   Creatinine 0.9   < > 0.8 1.1   Alkaline Phosphatase 64  --  62 76   ALT 16  --  35 20   AST 20  --  32 18   Total Bilirubin 0.3  --  0.4 0.5    < > = values in this interval not displayed.     URINALYSIS:  Recent Labs   Lab 04/20/21  0835 01/28/22  0035 02/11/22  1221   Color, UA Yellow Yellow Yellow   Specific Gravity, UA >=1.030 A >1.030 A 1.030   pH, UA 6.0 7.0 6.0   Protein, UA 1+ A 2+ A 1+ A   Bacteria Rare None Rare   Nitrite, UA Negative Negative Negative   Leukocytes, UA Negative Negative Negative   Urobilinogen, UA Negative Negative 2.0-3.0 A   Hyaline Casts, UA 0 0 1      LIPIDS:  Recent Labs   Lab 04/20/21  0855 08/20/21  0742 02/11/22  1255   TSH 3.232  --  1.751   HDL 41 35 L  --    Cholesterol 219 H 182  --    Triglycerides 138 144  --    LDL Cholesterol 150.4 118.2  --    HDL/Cholesterol Ratio 18.7 L 19.2 L  --    Non-HDL Cholesterol 178 147  --    Total Cholesterol/HDL Ratio 5.3 H 5.2 H  --      TSH:  Recent Labs   Lab 04/20/21  0855 02/11/22  1255   TSH 3.232 1.751       A1C:  Recent Labs   Lab 04/20/21  0855 08/20/21  0742 02/11/22  1255   Hemoglobin A1C 8.3 H 6.6 H 6.9 H       Imaging:  X-Ray Lumbar Spine AP And Lateral  Narrative: EXAMINATION:  XR LUMBAR SPINE AP AND LATERAL    CLINICAL HISTORY:  Low back pain,  unspecified    TECHNIQUE:  AP, lateral and spot images were performed of the lumbar spine.    COMPARISON:  Lumbar spine, 06/01/2006    FINDINGS:  Bony structures appear in tact with generalized spondylosis with marginal osteophytes and disc space narrowing throughout.  Alignment is maintained.  Pedicles demonstrate no destruction  Impression: Increasing spondylosis throughout lumbar spine with no evidence of acute fracture.    Electronically signed by: Lázaro Barber  Date:    02/24/2022  Time:    10:09      Assessment:       1. Wellness examination    2. Type 2 diabetes mellitus with hyperglycemia, without long-term current use of insulin    3. Hyperlipidemia, unspecified hyperlipidemia type    4. Trigeminal neuralgia    5. Hearing difficulty, unspecified laterality    6. Depression, unspecified depression type          Chronic conditions status updated as per HPI. Other than changes above, cont current medications and maintain follow up with specialist. Return to clinic in 3 months.  Plan:       Norman was seen today for follow-up.    Diagnoses and all orders for this visit:    Wellness examination   - Stable and actually improved mood.   - Vitals and Diabetes stable   - Statin is questionable at his age but could be considered.   - Lifestyle modification with diet and exercise encouraged. Appetite may have been limiting but appears improved.   - Ear cerumen present but possible sensorineural hearing loss. ENT consulted.    Type 2 diabetes mellitus with hyperglycemia, without long-term current use of insulin  -     A1c <7  - Comprehensive Metabolic Panel; Future  -     Hemoglobin A1C; Future  -     TSH; Future    Hyperlipidemia, unspecified hyperlipidemia type  -     Lipid Panel; Future  - Low dose statin could be considered    Trigeminal neuralgia  -     CBC Auto Differential; Future  - Stable. Follows with neurology    Hearing difficulty, unspecified laterality  -     Ambulatory referral/consult to ENT;  Future  -     Ambulatory referral/consult to Audiology; Future    Depression, unspecified depression type  -    Improved mood. Same tx.   - TSH; Future      Health Maintenance Due   Topic Date Due    Foot Exam  Never done    TETANUS VACCINE  Never done    Shingles Vaccine (1 of 2) Never done    COVID-19 Vaccine (3 - Booster for Pfizer series) 01/26/2022        Darshana Brandon MD  [facility]  Disclaimer:  This note has been generated using voice-recognition software. There may be grammatical or spelling errors that have been missed during proof-reading

## 2022-04-22 ENCOUNTER — PATIENT MESSAGE (OUTPATIENT)
Dept: FAMILY MEDICINE | Facility: CLINIC | Age: 87
End: 2022-04-22
Payer: COMMERCIAL

## 2022-04-22 PROBLEM — H91.90 HEARING DIFFICULTY: Status: ACTIVE | Noted: 2022-04-22

## 2022-04-22 PROBLEM — F32.A DEPRESSION: Status: ACTIVE | Noted: 2021-05-26

## 2022-04-22 PROBLEM — E78.5 HYPERLIPIDEMIA: Status: ACTIVE | Noted: 2022-04-22

## 2022-04-26 ENCOUNTER — OFFICE VISIT (OUTPATIENT)
Dept: UROLOGY | Facility: CLINIC | Age: 87
End: 2022-04-26
Payer: COMMERCIAL

## 2022-04-26 VITALS
WEIGHT: 166.13 LBS | BODY MASS INDEX: 26.7 KG/M2 | DIASTOLIC BLOOD PRESSURE: 77 MMHG | SYSTOLIC BLOOD PRESSURE: 144 MMHG | HEIGHT: 66 IN | HEART RATE: 82 BPM

## 2022-04-26 DIAGNOSIS — R35.0 FREQUENCY OF URINATION: ICD-10-CM

## 2022-04-26 DIAGNOSIS — N40.1 BENIGN PROSTATIC HYPERPLASIA WITH NOCTURIA: Primary | ICD-10-CM

## 2022-04-26 DIAGNOSIS — R35.1 BENIGN PROSTATIC HYPERPLASIA WITH NOCTURIA: Primary | ICD-10-CM

## 2022-04-26 LAB — POC RESIDUAL URINE VOLUME: 25 ML (ref 0–100)

## 2022-04-26 PROCEDURE — 99204 PR OFFICE/OUTPT VISIT, NEW, LEVL IV, 45-59 MIN: ICD-10-PCS | Mod: S$GLB,,, | Performed by: UROLOGY

## 2022-04-26 PROCEDURE — 51798 US URINE CAPACITY MEASURE: CPT | Mod: S$GLB,,, | Performed by: UROLOGY

## 2022-04-26 PROCEDURE — 51798 POCT BLADDER SCAN: ICD-10-PCS | Mod: S$GLB,,, | Performed by: UROLOGY

## 2022-04-26 PROCEDURE — 99999 PR PBB SHADOW E&M-EST. PATIENT-LVL IV: ICD-10-PCS | Mod: PBBFAC,,, | Performed by: UROLOGY

## 2022-04-26 PROCEDURE — 99204 OFFICE O/P NEW MOD 45 MIN: CPT | Mod: S$GLB,,, | Performed by: UROLOGY

## 2022-04-26 PROCEDURE — 99999 PR PBB SHADOW E&M-EST. PATIENT-LVL IV: CPT | Mod: PBBFAC,,, | Performed by: UROLOGY

## 2022-04-26 RX ORDER — TAMSULOSIN HYDROCHLORIDE 0.4 MG/1
0.4 CAPSULE ORAL DAILY
Qty: 30 CAPSULE | Refills: 11 | Status: SHIPPED | OUTPATIENT
Start: 2022-04-26 | End: 2023-04-28

## 2022-04-26 NOTE — PROGRESS NOTES
Subjective:       Patient ID: Norman Saunders is a 89 y.o. male.    Chief Complaint: Urinary Frequency     This is a 89 y.o.  male patient that is new to me.  Referred for nocturia, was going every 1.5 hours at night, Dr. Brandon then started tamsulosin and has improved.  Currently no nocturia.  AUA SS 4/1, PVR low.  No hematuria.  No  history.  No flank pain, N/V.  No h/o stones.      IPSS Questionnaire (AUA-SS):  Over the past month    1)  Incomplete Emptying - How often have you had a sensation of not emptying your bladder completely after you finish urinating?  0 - Not at all   2)  Frequency - How often have you had to urinate again less than two hours after you finished urinating? 1 - Less than 1 time in 5   3)  Intermittency - How often have you found you stopped and started again several times when you urinated?  0 - Not at all   4) Urgency - How difficult have you found it to postpone urination?  0 - Not at all   5) Weak Stream - How often have you had a weak urinary stream?  2 - Less than half the time   6) Straining  - How often have you had to push or strain to begin urination?  0 - Not at all   7) Nocturia - How many times did you most typically get up to urinate from the time you went to bed until the time you got up in the morning?  1 - 1 time   Total score:  0-7 mild, 8-19 moderate, 20-35 severe 4   QOL  1        LAST PSA  Lab Results   Component Value Date    PSA 0.67 09/29/2010    PSA 0.50 09/19/2008    PSA 0.6 06/01/2006       Lab Results   Component Value Date    CREATININE 1.1 04/21/2022       ---  Past Medical History:   Diagnosis Date    Cataract     COVID-19 01/27/2022    GERD (gastroesophageal reflux disease)     Trigeminal neuralgia     Type 2 diabetes mellitus with hyperglycemia, without long-term current use of insulin 5/26/2021       Past Surgical History:   Procedure Laterality Date    CATARACT EXTRACTION         Family History   Problem Relation Age of Onset    No Known Problems  Mother     No Known Problems Father     Amblyopia Neg Hx     Blindness Neg Hx     Cancer Neg Hx     Cataracts Neg Hx     Diabetes Neg Hx     Glaucoma Neg Hx     Hypertension Neg Hx     Macular degeneration Neg Hx     Retinal detachment Neg Hx     Strabismus Neg Hx     Stroke Neg Hx     Thyroid disease Neg Hx        Social History     Tobacco Use    Smoking status: Never Smoker    Smokeless tobacco: Never Used   Substance Use Topics    Alcohol use: No    Drug use: No       Current Outpatient Medications on File Prior to Visit   Medication Sig Dispense Refill    ascorbic acid, vitamin C, (VITAMIN C) 500 MG tablet Take 1 tablet (500 mg total) by mouth 2 (two) times daily. 30 tablet 0    aspirin (ECOTRIN) 81 MG EC tablet Take 1 tablet (81 mg total) by mouth once daily. 30 tablet 11    blood sugar diagnostic Strp To check BG 2 times daily, to use with insurance preferred meter 100 strip 11    blood-glucose meter kit To check BG 2 times daily, to use with insurance preferred meter 1 each 5    clotrimazole (LOTRIMIN) 1 % Soln Apply 4 drops to affected ear twice daily 10 mL 2    lancets Misc To check BG 2 times daily, to use with insurance preferred meter 100 each 11    megestroL (MEGACE) 400 mg/10 mL (40 mg/mL) Susp Take 1 mL (40 mg total) by mouth once daily. 200 mL 2    metFORMIN (GLUCOPHAGE-XR) 500 MG ER 24hr tablet TOME JAK TABLETA TODOS LOS SWENSON CON EL DESAYUNO 90 tablet 3    omeprazole (PRILOSEC) 20 MG capsule Take 1 capsule (20 mg total) by mouth once daily. Para el reflujo. 90 capsule 3    [DISCONTINUED] fluticasone propionate (FLONASE) 50 mcg/actuation nasal spray 2 SPRAYS (100 MCG TOTAL) BY EACH NOSTRIL ROUTE ONCE DAILY. 48 mL 1    [DISCONTINUED] meclizine (ANTIVERT) 12.5 mg tablet Take 1 tablet (12.5 mg total) by mouth 2 (two) times daily as needed for Dizziness. 60 tablet 6    [DISCONTINUED] OXcarbazepine (TRILEPTAL) 150 MG Tab Take 1 tablet (150 mg total) by mouth 2 (two) times  daily. 60 tablet 11    [DISCONTINUED] tamsulosin (FLOMAX) 0.4 mg Cap Take 1 capsule (0.4 mg total) by mouth once daily. 30 capsule 11     No current facility-administered medications on file prior to visit.       Review of patient's allergies indicates:  No Known Allergies    Review of Systems   Constitutional: Negative for activity change, chills and fever.   HENT: Negative for congestion.    Respiratory: Negative for cough, chest tightness and shortness of breath.    Cardiovascular: Negative for chest pain and palpitations.   Gastrointestinal: Negative for abdominal distention, abdominal pain, nausea and vomiting.   Genitourinary: Negative for difficulty urinating, flank pain, hematuria, penile pain, scrotal swelling and testicular pain.   Musculoskeletal: Negative for gait problem.       Objective:      Physical Exam  Constitutional:       Appearance: Normal appearance.   HENT:      Head: Normocephalic.   Pulmonary:      Effort: Pulmonary effort is normal.      Breath sounds: Normal breath sounds.   Abdominal:      General: Abdomen is flat. Bowel sounds are normal.      Palpations: Abdomen is soft.      Tenderness: There is no abdominal tenderness. There is no right CVA tenderness, left CVA tenderness or guarding.   Musculoskeletal:         General: Normal range of motion.      Cervical back: Normal range of motion.   Skin:     General: Skin is warm.   Neurological:      Mental Status: He is alert.         Assessment:     Problem Noted   Benign Prostatic Hyperplasia With Nocturia 4/26/2022    AUA SS 4/1   PVR low  Tamsulosin     Frequency of Urination 4/26/2022       Plan:     1. Refilled tamsulosin, SEs discussed  2. Follow up in 3 months    Susana  #[id number] was used.        Florencio Ron MD

## 2022-05-27 ENCOUNTER — PATIENT MESSAGE (OUTPATIENT)
Dept: PAIN MEDICINE | Facility: CLINIC | Age: 87
End: 2022-05-27
Payer: COMMERCIAL

## 2022-05-27 DIAGNOSIS — G50.0 TRIGEMINAL NEURALGIA: Primary | ICD-10-CM

## 2022-05-30 ENCOUNTER — PATIENT MESSAGE (OUTPATIENT)
Dept: PAIN MEDICINE | Facility: CLINIC | Age: 87
End: 2022-05-30
Payer: COMMERCIAL

## 2022-05-30 RX ORDER — OXCARBAZEPINE 150 MG/1
150 TABLET, FILM COATED ORAL 2 TIMES DAILY
Qty: 60 TABLET | Refills: 11 | Status: SHIPPED | OUTPATIENT
Start: 2022-05-30 | End: 2022-06-07

## 2022-05-30 NOTE — TELEPHONE ENCOUNTER
Refill trileptal 150 mg BID. Labs reviewed from April. Please provide earlier f/u on 6/9 at 8 AM slot.

## 2022-06-07 ENCOUNTER — OFFICE VISIT (OUTPATIENT)
Dept: PAIN MEDICINE | Facility: CLINIC | Age: 87
End: 2022-06-07
Payer: COMMERCIAL

## 2022-06-07 VITALS — DIASTOLIC BLOOD PRESSURE: 74 MMHG | SYSTOLIC BLOOD PRESSURE: 129 MMHG | HEART RATE: 76 BPM

## 2022-06-07 DIAGNOSIS — M54.50 CHRONIC BILATERAL LOW BACK PAIN WITHOUT SCIATICA: ICD-10-CM

## 2022-06-07 DIAGNOSIS — G50.0 LEFT-SIDED TRIGEMINAL NEURALGIA: Primary | ICD-10-CM

## 2022-06-07 DIAGNOSIS — Z98.890 HISTORY OF LUMBAR SURGERY: ICD-10-CM

## 2022-06-07 DIAGNOSIS — R29.898 BILATERAL LEG WEAKNESS: ICD-10-CM

## 2022-06-07 DIAGNOSIS — M53.86 DECREASED RANGE OF MOTION OF LUMBAR SPINE: ICD-10-CM

## 2022-06-07 DIAGNOSIS — R42 VERTIGO: ICD-10-CM

## 2022-06-07 DIAGNOSIS — G50.0 TRIGEMINAL NEURALGIA: ICD-10-CM

## 2022-06-07 DIAGNOSIS — G89.29 CHRONIC BILATERAL LOW BACK PAIN WITHOUT SCIATICA: ICD-10-CM

## 2022-06-07 PROCEDURE — 99999 PR PBB SHADOW E&M-EST. PATIENT-LVL IV: ICD-10-PCS | Mod: PBBFAC,,, | Performed by: PHYSICAL MEDICINE & REHABILITATION

## 2022-06-07 PROCEDURE — 99214 PR OFFICE/OUTPT VISIT, EST, LEVL IV, 30-39 MIN: ICD-10-PCS | Mod: S$GLB,,, | Performed by: PHYSICAL MEDICINE & REHABILITATION

## 2022-06-07 PROCEDURE — 99999 PR PBB SHADOW E&M-EST. PATIENT-LVL IV: CPT | Mod: PBBFAC,,, | Performed by: PHYSICAL MEDICINE & REHABILITATION

## 2022-06-07 PROCEDURE — 99214 OFFICE O/P EST MOD 30 MIN: CPT | Mod: S$GLB,,, | Performed by: PHYSICAL MEDICINE & REHABILITATION

## 2022-06-07 RX ORDER — OXCARBAZEPINE 300 MG/1
300 TABLET, FILM COATED ORAL 2 TIMES DAILY
Qty: 60 TABLET | Refills: 11 | Status: SHIPPED | OUTPATIENT
Start: 2022-06-07 | End: 2022-06-07

## 2022-06-07 RX ORDER — OXCARBAZEPINE 300 MG/1
300 TABLET, FILM COATED ORAL 2 TIMES DAILY
Qty: 180 TABLET | Refills: 3 | Status: SHIPPED | OUTPATIENT
Start: 2022-06-07 | End: 2023-08-04

## 2022-06-07 NOTE — PROGRESS NOTES
[facility]      Chief Complaint:   Chief Complaint   Patient presents with    Facial Pain     Jaw        History of Present Illness: Norman Saunders is a 89 y.o. male referred by Dr. Darshana Brandon for trigeminal neuralgia.      Onset: years  Location: left upper jaw  Radiation: in the jaw/face  Timing: intermittent, occurs for 2-3 minutes  Quality: Sharp, ice-pick  Exacerbating Factors: eating hot or cold, brushing  Alleviating Factors: heat, pressure over face  Associated Symptoms: No eye redness, tearing, congestion, facial weakness, or vision changes, trouble swallowing.     Back Pain has been present for 3 months since a fall. He had a prior lumbar surgery in the 1970s. Pain is localized to the bilateral lumbar spine with no radiation into the legs. Pain is described as aching and tightness. The pain is aggravated by standing. The pain is alleviated by sitting. He notes weakness and numbness in both his legs. Denies changes in bowel or bladder function. Denies saddle anesthesia. Denies recent fevers or infections. Denies significant weight loss    Severity: Currently: 7/10   Typical Range: 0-7/10     Exacerbation: 7/10     Interval History (06/07/2022):  Norman Saunders returns today for follow up.  At the last clinic visit, referred to physical therapy but he did not do physical therapy.  His low back pain has been improved recently.  He has been walking every other day for about a mile.    He is here with his grandson, interpretor line was offered, but patient would rather his grandson interpret for us today.     Currently, the left jaw/face pain is stable.  It is located on the left side of his face and is unchanged from his previous appointment. Pain mostly in the mouth but also in left forehead area. He is taking oxcarbamazapine 150 mg BID that improves his pain but not significantly. He is here today with his grandson.     He also notes dizziness described as the room spinning for the past 5 years  that was not changed since starting trileptal. No significant interval events or traumas.       Current Pain Scales:  Current: 4/10              Typical Range: 0-5/10  Pain Disability Index  Family/Home Responsibilities:: 0  Recreation:: 0  Social Activity:: 0  Occupation:: 0  Sexual Behavior:: 0  Self Care:: 0  Life-Support Activities:: 5  Pain Disability Index (PDI): 5    Previous Interventions:  - None    Previous Therapies:  PT/OT: no   Relevant Surgery: yes    - Lumbar surgery in the 1970s  Previous Medications:   - NSAIDS: relafen  - Muscle Relaxants:    - TCAs:   - SNRIs:   - Topicals:   - Anticonvulsants: gabapentin   - Opioids:     Current Pain Medications:  1.  Relafen  2. Trileptal 150 mg BID    Blood Thinners: ASA 81mg    Full Medication List:    Current Outpatient Medications:     ascorbic acid, vitamin C, (VITAMIN C) 500 MG tablet, Take 1 tablet (500 mg total) by mouth 2 (two) times daily., Disp: 30 tablet, Rfl: 0    aspirin (ECOTRIN) 81 MG EC tablet, Take 1 tablet (81 mg total) by mouth once daily., Disp: 30 tablet, Rfl: 11    blood sugar diagnostic Strp, To check BG 2 times daily, to use with insurance preferred meter, Disp: 100 strip, Rfl: 11    blood-glucose meter kit, To check BG 2 times daily, to use with insurance preferred meter, Disp: 1 each, Rfl: 5    clotrimazole (LOTRIMIN) 1 % Soln, Apply 4 drops to affected ear twice daily, Disp: 10 mL, Rfl: 2    lancets Misc, To check BG 2 times daily, to use with insurance preferred meter, Disp: 100 each, Rfl: 11    megestroL (MEGACE) 400 mg/10 mL (40 mg/mL) Susp, Take 1 mL (40 mg total) by mouth once daily., Disp: 200 mL, Rfl: 2    metFORMIN (GLUCOPHAGE-XR) 500 MG ER 24hr tablet, TOME JAK TABLETA TODOS LOS SWENSON CON EL DESAYUNO, Disp: 90 tablet, Rfl: 3    omeprazole (PRILOSEC) 20 MG capsule, Take 1 capsule (20 mg total) by mouth once daily. Para el reflujo., Disp: 90 capsule, Rfl: 3    tamsulosin (FLOMAX) 0.4 mg Cap, Take 1 capsule (0.4 mg  total) by mouth once daily., Disp: 30 capsule, Rfl: 11    OXcarbazepine (TRILEPTAL) 300 MG Tab, Take 1 tablet (300 mg total) by mouth 2 (two) times daily., Disp: 180 tablet, Rfl: 3     Review of Systems:  ROS    Allergies:  Patient has no known allergies.     Medical History:   has a past medical history of Cataract, COVID-19 (01/27/2022), GERD (gastroesophageal reflux disease), Trigeminal neuralgia, and Type 2 diabetes mellitus with hyperglycemia, without long-term current use of insulin (5/26/2021).    Surgical History:   has a past surgical history that includes Cataract extraction.    Family History:  family history includes No Known Problems in his father and mother.    Social History:   reports that he has never smoked. He has never used smokeless tobacco. He reports that he does not drink alcohol and does not use drugs.    Physical Exam:  /74   Pulse 76   GEN: No acute distress. Calm, comfortable  HENT: Normocephalic, atraumatic, moist mucous membranes  EYE: Anicteric sclera, non-injected.   CV: Non-diaphoretic. Regular Rate. Radial Pulses 2+.  RESP: Breathing comfortably. Chest expansion symmetric.  EXT: No clubbing, cyanosis.   SKIN: Warm, & dry to palpation. No visible rashes or lesions of exposed skin.   PSYCH: Pleasant mood and appropriate affect. Recent and remote memory intact.   GAIT: Independent, antalgic ambulation  Lumbar Spine Exam:       Inspection: No erythema, bruising. Kyphotic posture      Palpation: (-) TTP of lumbar paraspinals b/l       ROM:  Limited in flexion, extension, lateral bending.       (+) Facet loading bilaterally      (-) Straight Leg Raise bilaterally      (+) DARRYL bilaterally  Hip Exam:      Inspection: No gross deformity or apparent leg length discrepancy      Palpation: No TTP to bilateral greater trochanteric bursas.       ROM:  No pain in internal rotation, external rotation b/l  Neurologic Exam:     Alert. Speech is fluent and appropriate.     Cranial Nerves:  Extra-ocular movements intact. Pupils equal. No strabismus. Face Symmetric. Jaw opens midline. Uvula midline. Tongue midline. Shoulder shrug symmetric.       Strength: 4/5 in bilateral hip flexion, left knee extension, otherwise 5/5 throughout bilateral upper & lower extremities     Sensation: Grossly intact to light touch in bilateral upper & lower extremities     Reflexes: 2+ in b/l patella, 1+ in b/l achilles, biceps, brachioradialis, triceps     Tone: No abnormality appreciated      No Clonus                Imaging:  - X-ray lumbar spine 2/24/22:  Bony structures appear in tact with generalized spondylosis with marginal osteophytes and disc space narrowing throughout.  Alignment is maintained.  Pedicles demonstrate no destruction    - MRI Brain w/o contrast 5/16/17:  1.  Noncontrast MRI demonstrates no evidence of acute intracranial pathology.  No significant abnormality to correlate with patient's clinical history of trigeminal neuralgia.  2.  Age-appropriate degenerative volume loss with mild degree of supratentorial and periventricular white matter changes suggestive of chronic microvascular ischemic disease.      Labs:  BMP  Lab Results   Component Value Date     04/21/2022    K 4.1 04/21/2022     04/21/2022    CO2 24 04/21/2022    BUN 12 04/21/2022    CREATININE 1.1 04/21/2022    CALCIUM 9.7 04/21/2022    ANIONGAP 12 04/21/2022    ESTGFRAFRICA >60 04/21/2022    EGFRNONAA 59 (A) 04/21/2022     Lab Results   Component Value Date    ALT 15 04/21/2022    AST 17 04/21/2022    ALKPHOS 68 04/21/2022    BILITOT 0.5 04/21/2022     Lab Results   Component Value Date     04/21/2022       Assessment:  Norman Saunders is a 89 y.o. male with the following diagnoses based on history, exam, and imaging:    Problem List Items Addressed This Visit        Neuro    Trigeminal neuralgia - Primary    Relevant Medications    OXcarbazepine (TRILEPTAL) 300 MG Tab       ENT    Vertigo    Relevant Orders     Ambulatory referral/consult to Neurology       Orthopedic    Low back pain      Other Visit Diagnoses     History of lumbar surgery        Relevant Orders    Ambulatory referral/consult to Physical/Occupational Therapy    Bilateral leg weakness        Relevant Orders    Ambulatory referral/consult to Physical/Occupational Therapy    Decreased range of motion of lumbar spine        Relevant Orders    Ambulatory referral/consult to Physical/Occupational Therapy          This is a pleasant 89 y.o. gentleman presenting with:     - Trigeminal Neuralgia on the Left: Classic presentation  - Chronic bilateral low back pain: Prior lumbar surgery, but not sure of details. Pain appears primarily facetogenic, but some aspects of LSS with claudication.   - Comorbidities: DM2. GERD. MDD. Vertigo.     Treatment Plan:   - PT/OT/HEP:  Referral to PT. Encouraged patient to go this time. Discussed benefits of exercise for pain.   - Procedures: Scheduled left gasserian nerve block in 3-4 weeks, advised patient to cancel if oxcarbazepine titration effective. Risks of procedure discussed with maria antonia and Mr. Saunders today.   - Medications:    - Increased oxcarbazepine 300 mg BID. 1 year refills provided. Reviewed CBC and CMP. New CBC CMP scheduled in 1 month  - Referral to neurology to evaluate dizziness/vertigo. He may also discuss with ENT which he stated he had an appt soon.   - Imaging: Reviewed. Plan for lumbar MRI if back pain persists despite PT  - Labs: Reviewed.  Medications are appropriately dosed for current hepatorenal function.   - Plan to update CBC and CMP at next visit after titration of oxcarbazepine for 1-2 months    Follow Up: RTC 2-3 weeks after left gasserian nerve block     Yuan Smith MD  [facility] PM[facility] Resident     The patient was seen and examined with the resident. I agree with the above documentation, and have edited as necessary.       Nuvia Montoya M.D.  Interventional Pain Medicine / Physical Medicine &  Rehabilitation    Disclaimer: This note was partly generated using dictation software which may occasionally result in transcription errors.

## 2022-06-08 ENCOUNTER — TELEPHONE (OUTPATIENT)
Dept: PAIN MEDICINE | Facility: CLINIC | Age: 87
End: 2022-06-08
Payer: COMMERCIAL

## 2022-06-08 DIAGNOSIS — G50.0 TRIGEMINAL NEURALGIA: Primary | ICD-10-CM

## 2022-06-09 ENCOUNTER — TELEPHONE (OUTPATIENT)
Dept: PAIN MEDICINE | Facility: CLINIC | Age: 87
End: 2022-06-09
Payer: COMMERCIAL

## 2022-06-09 NOTE — TELEPHONE ENCOUNTER
Spoke with son and informed that he may hold medications.     Last day 6/24 and restart 6/30    He voiced understanding.   ER

## 2022-06-09 NOTE — TELEPHONE ENCOUNTER
----- Message from Darshana Brandon MD sent at 6/8/2022  5:29 PM CDT -----  Regarding: RE: Blood thinner clearance  Hi, we can hold the aspirin but I would also hold the Megace.  EYR  ----- Message -----  From: Fatmata Salazar MA  Sent: 6/8/2022   4:50 PM CDT  To: Darshana Brandon MD, [facility] Staff  Subject: Blood thinner clearance                          Good afternoon,       We are requesting clearance to hold Blood Thinners: Aspirin for 5 days. Pt is scheduled for Gasserian Ganglion Block with Dr Montoya on 6/29.     Please advise.  Thanks,   ER

## 2022-06-23 ENCOUNTER — TELEPHONE (OUTPATIENT)
Dept: PAIN MEDICINE | Facility: CLINIC | Age: 87
End: 2022-06-23
Payer: COMMERCIAL

## 2022-06-23 ENCOUNTER — PATIENT MESSAGE (OUTPATIENT)
Dept: PAIN MEDICINE | Facility: HOSPITAL | Age: 87
End: 2022-06-23
Payer: COMMERCIAL

## 2022-06-23 NOTE — TELEPHONE ENCOUNTER
----- Message from Halie Hamilton sent at 6/23/2022 10:18 AM CDT -----  Type: Requesting to speak with nurse         Who Called: PT  Regarding: pt wanting to cancel procedure please advise   Would the patient rather a call back or a response via [employer]? Call back  Best Call Back Number: [phone] or [phone]  Additional Information: n/a

## 2022-07-08 ENCOUNTER — PATIENT MESSAGE (OUTPATIENT)
Dept: OTOLARYNGOLOGY | Facility: CLINIC | Age: 87
End: 2022-07-08
Payer: COMMERCIAL

## 2022-07-08 ENCOUNTER — TELEPHONE (OUTPATIENT)
Dept: OTOLARYNGOLOGY | Facility: CLINIC | Age: 87
End: 2022-07-08
Payer: COMMERCIAL

## 2022-07-08 NOTE — TELEPHONE ENCOUNTER
Contacted patient and rescheduled visit from 7/25 to 7/13 due to provider being out of the office. Patient confirmed 1pm visit and thanked me for calling.

## 2022-07-21 ENCOUNTER — OFFICE VISIT (OUTPATIENT)
Dept: FAMILY MEDICINE | Facility: CLINIC | Age: 87
End: 2022-07-21
Payer: COMMERCIAL

## 2022-07-21 VITALS
DIASTOLIC BLOOD PRESSURE: 62 MMHG | OXYGEN SATURATION: 95 % | HEART RATE: 79 BPM | BODY MASS INDEX: 27.35 KG/M2 | WEIGHT: 170.19 LBS | SYSTOLIC BLOOD PRESSURE: 134 MMHG | HEIGHT: 66 IN

## 2022-07-21 DIAGNOSIS — E78.5 HYPERLIPIDEMIA, UNSPECIFIED HYPERLIPIDEMIA TYPE: ICD-10-CM

## 2022-07-21 DIAGNOSIS — E11.65 TYPE 2 DIABETES MELLITUS WITH HYPERGLYCEMIA, WITHOUT LONG-TERM CURRENT USE OF INSULIN: Primary | ICD-10-CM

## 2022-07-21 DIAGNOSIS — H91.90 HEARING DIFFICULTY, UNSPECIFIED LATERALITY: ICD-10-CM

## 2022-07-21 DIAGNOSIS — G50.0 TRIGEMINAL NEURALGIA: ICD-10-CM

## 2022-07-21 DIAGNOSIS — R35.1 BENIGN PROSTATIC HYPERPLASIA WITH NOCTURIA: ICD-10-CM

## 2022-07-21 DIAGNOSIS — N40.1 BENIGN PROSTATIC HYPERPLASIA WITH NOCTURIA: ICD-10-CM

## 2022-07-21 PROCEDURE — 99499 UNLISTED E&M SERVICE: CPT | Mod: S$GLB,,, | Performed by: INTERNAL MEDICINE

## 2022-07-21 PROCEDURE — 99999 PR PBB SHADOW E&M-EST. PATIENT-LVL III: CPT | Mod: PBBFAC,,, | Performed by: INTERNAL MEDICINE

## 2022-07-21 PROCEDURE — 99499 RISK ADDL DX/OHS AUDIT: ICD-10-PCS | Mod: S$GLB,,, | Performed by: INTERNAL MEDICINE

## 2022-07-21 PROCEDURE — 99214 OFFICE O/P EST MOD 30 MIN: CPT | Mod: S$GLB,,, | Performed by: INTERNAL MEDICINE

## 2022-07-21 PROCEDURE — 99214 PR OFFICE/OUTPT VISIT, EST, LEVL IV, 30-39 MIN: ICD-10-PCS | Mod: S$GLB,,, | Performed by: INTERNAL MEDICINE

## 2022-07-21 PROCEDURE — 99999 PR PBB SHADOW E&M-EST. PATIENT-LVL III: ICD-10-PCS | Mod: PBBFAC,,, | Performed by: INTERNAL MEDICINE

## 2022-07-21 RX ORDER — ZOSTER VACCINE RECOMBINANT, ADJUVANTED 50 MCG/0.5
0.5 KIT INTRAMUSCULAR ONCE
Qty: 1 EACH | Refills: 0 | Status: SHIPPED | OUTPATIENT
Start: 2022-07-21 | End: 2022-07-23

## 2022-07-21 RX ORDER — ZOSTER VACCINE RECOMBINANT, ADJUVANTED 50 MCG/0.5
0.5 KIT INTRAMUSCULAR ONCE
Qty: 1 EACH | Refills: 0 | Status: SHIPPED | OUTPATIENT
Start: 2022-07-21 | End: 2022-07-21

## 2022-07-21 NOTE — PROGRESS NOTES
Subjective:       Patient ID: Norman Saunders is a 89 y.o. male.    Chief Complaint: Follow-up (3 month)      HPI  Norman Saunders is a 89 y.o. male with chronic conditions of diabetes mellitus type 2, depression, trigeminal neuralgia, GERD who presents today for routine health maintenance.    Reports he feels well.  Urinating better with Flomax.  Does not have to get up to go to the bathroom as he used to at night.    His open management and a shot was offered but the patient did not want to consider the injection with risk of nerve injury.  Reports his pain is on and off, has learned to live with it.  Uses Tylenol as needed.    Has appointment for his hearing aids.    No new labs.    Appetite has been good and no longer using medication for appetite.    Health Maintenance:  Health Maintenance   Topic Date Due    TETANUS VACCINE  Never done    Eye Exam  08/24/2022    Hemoglobin A1c  10/21/2022    Foot Exam  04/21/2023    Lipid Panel  04/21/2023       Review of Systems   Constitutional: Negative.  Negative for fever and unexpected weight change.   HENT: Positive for hearing loss.         Left facial pain on off.   Respiratory: Negative.    Cardiovascular: Negative.    Gastrointestinal: Negative.    Genitourinary: Negative for difficulty urinating.   Musculoskeletal: Negative.    Integumentary:  Negative.   Neurological: Negative.    Psychiatric/Behavioral: Negative.  Negative for sleep disturbance.      Past Medical History:   Diagnosis Date    Cataract     COVID-19 01/27/2022    GERD (gastroesophageal reflux disease)     Trigeminal neuralgia     Type 2 diabetes mellitus with hyperglycemia, without long-term current use of insulin 5/26/2021       Past Surgical History:   Procedure Laterality Date    CATARACT EXTRACTION         Family History   Problem Relation Age of Onset    No Known Problems Mother     No Known Problems Father     Amblyopia Neg Hx     Blindness Neg Hx     Cancer Neg Hx      Cataracts Neg Hx     Diabetes Neg Hx     Glaucoma Neg Hx     Hypertension Neg Hx     Macular degeneration Neg Hx     Retinal detachment Neg Hx     Strabismus Neg Hx     Stroke Neg Hx     Thyroid disease Neg Hx        Social History     Socioeconomic History    Marital status:    Tobacco Use    Smoking status: Never Smoker    Smokeless tobacco: Never Used   Substance and Sexual Activity    Alcohol use: No    Drug use: No       Current Outpatient Medications   Medication Sig Dispense Refill    ascorbic acid, vitamin C, (VITAMIN C) 500 MG tablet Take 1 tablet (500 mg total) by mouth 2 (two) times daily. 30 tablet 0    aspirin (ECOTRIN) 81 MG EC tablet Take 1 tablet (81 mg total) by mouth once daily. 30 tablet 11    blood sugar diagnostic Strp To check BG 2 times daily, to use with insurance preferred meter 100 strip 11    blood-glucose meter kit To check BG 2 times daily, to use with insurance preferred meter 1 each 5    clotrimazole (LOTRIMIN) 1 % Soln Apply 4 drops to affected ear twice daily 10 mL 2    lancets Misc To check BG 2 times daily, to use with insurance preferred meter 100 each 11    metFORMIN (GLUCOPHAGE-XR) 500 MG ER 24hr tablet TOME JAK TABLETA TODOS LOS SWENSON CON EL DESAYUNO 90 tablet 3    omeprazole (PRILOSEC) 20 MG capsule Take 1 capsule (20 mg total) by mouth once daily. Para el reflujo. 90 capsule 3    OXcarbazepine (TRILEPTAL) 300 MG Tab Take 1 tablet (300 mg total) by mouth 2 (two) times daily. 180 tablet 3    tamsulosin (FLOMAX) 0.4 mg Cap Take 1 capsule (0.4 mg total) by mouth once daily. 30 capsule 11    varicella-zoster gE-AS01B, PF, (SHINGRIX, PF,) 50 mcg/0.5 mL injection Inject 0.5 mLs into the muscle once. for 1 dose 1 each 0     No current facility-administered medications for this visit.       Review of patient's allergies indicates:  No Known Allergies      Objective:       Last 3 sets of Vitals    Vitals - 1 value per visit 6/7/2022 7/21/2022 7/21/2022    SYSTOLIC 129 - 134   DIASTOLIC 74 - 62   Pulse 76 - 79   Temp - - -   Resp - - -   SPO2 - - 95   Weight (lb) - - 170.2   Weight (kg) - - 77.2   Height - - 66   BMI (Calculated) - - 27.5   VISIT REPORT - - -   Pain Score  - 0 -   Some recent data might be hidden   Physical Exam  Constitutional:       General: He is not in acute distress.     Appearance: Normal appearance.   HENT:      Head: Normocephalic.      Right Ear: Tympanic membrane, ear canal and external ear normal.      Left Ear: Tympanic membrane, ear canal and external ear normal.      Nose: Nose normal.      Mouth/Throat:      Mouth: Mucous membranes are moist.   Eyes:      General: No scleral icterus.     Extraocular Movements: Extraocular movements intact.      Conjunctiva/sclera: Conjunctivae normal.   Neck:      Vascular: No carotid bruit.      Comments: No goiter.  Cardiovascular:      Rate and Rhythm: Normal rate and regular rhythm.      Pulses: Normal pulses.      Heart sounds: Normal heart sounds.   Pulmonary:      Effort: Pulmonary effort is normal.      Breath sounds: Normal breath sounds.   Abdominal:      General: Bowel sounds are normal. There is no distension.      Palpations: Abdomen is soft. There is no mass.      Tenderness: There is no abdominal tenderness.   Musculoskeletal:         General: No swelling.   Lymphadenopathy:      Cervical: No cervical adenopathy.   Skin:     General: Skin is warm and dry.   Neurological:      General: No focal deficit present.      Mental Status: He is alert and oriented to person, place, and time.      Comments: Hard of hearing   Psychiatric:         Mood and Affect: Mood normal.         Behavior: Behavior normal.           CBC:  Recent Labs   Lab 01/29/22  0607 02/01/22  0353 04/21/22  1432   WBC 8.41 7.16 6.24   RBC 4.70 4.76 4.92   Hemoglobin 12.5 L 12.9 L 14.1   Hematocrit 39.0 L 39.4 L 42.1   Platelets 399 404 218   MCV 83 83 86   MCH 26.6 L 27.1 28.7   MCHC 32.1 32.7 33.5     CMP:  Recent Labs    Lab 02/01/22  0353 02/11/22  1255 04/21/22  1432   Glucose 114 H 117 H 125 H   Calcium 8.7 9.8 9.7   Albumin 2.9 L 3.1 L 4.2   Total Protein 6.2 7.6 8.0   Sodium 141 137 141   Potassium 4.2 4.2 4.1   CO2 24 23 24   Chloride 106 103 105   BUN 27 H 12 12   Creatinine 0.8 1.1 1.1   Alkaline Phosphatase 62 76 68   ALT 35 20 15   AST 32 18 17   Total Bilirubin 0.4 0.5 0.5     URINALYSIS:  Recent Labs   Lab 04/20/21  0835 01/28/22  0035 02/11/22  1221   Color, UA Yellow Yellow Yellow   Specific Gravity, UA >=1.030 A >1.030 A 1.030   pH, UA 6.0 7.0 6.0   Protein, UA 1+ A 2+ A 1+ A   Bacteria Rare None Rare   Nitrite, UA Negative Negative Negative   Leukocytes, UA Negative Negative Negative   Urobilinogen, UA Negative Negative 2.0-3.0 A   Hyaline Casts, UA 0 0 1      LIPIDS:  Recent Labs   Lab 04/20/21  0855 08/20/21  0742 02/11/22  1255 04/21/22  1432   TSH 3.232  --  1.751 1.704   HDL 41 35 L  --  48   Cholesterol 219 H 182  --  224 H   Triglycerides 138 144  --  128   LDL Cholesterol 150.4 118.2  --  150.4   HDL/Cholesterol Ratio 18.7 L 19.2 L  --  21.4   Non-HDL Cholesterol 178 147  --  176   Total Cholesterol/HDL Ratio 5.3 H 5.2 H  --  4.7     TSH:  Recent Labs   Lab 04/20/21  0855 02/11/22  1255 04/21/22  1432   TSH 3.232 1.751 1.704       A1C:  Recent Labs   Lab 04/20/21  0855 08/20/21  0742 02/11/22  1255 04/21/22  1432   Hemoglobin A1C 8.3 H 6.6 H 6.9 H 6.6 H       Imaging:  X-Ray Lumbar Spine AP And Lateral  Narrative: EXAMINATION:  XR LUMBAR SPINE AP AND LATERAL    CLINICAL HISTORY:  Low back pain, unspecified    TECHNIQUE:  AP, lateral and spot images were performed of the lumbar spine.    COMPARISON:  Lumbar spine, 06/01/2006    FINDINGS:  Bony structures appear in tact with generalized spondylosis with marginal osteophytes and disc space narrowing throughout.  Alignment is maintained.  Pedicles demonstrate no destruction  Impression: Increasing spondylosis throughout lumbar spine with no evidence of acute  fracture.    Electronically signed by: Lázaro Barber  Date:    02/24/2022  Time:    10:09      Assessment:       1. Type 2 diabetes mellitus with hyperglycemia, without long-term current use of insulin    2. Hyperlipidemia, unspecified hyperlipidemia type    3. Trigeminal neuralgia    4. Hearing difficulty, unspecified laterality    5. Benign prostatic hyperplasia with nocturia            Plan:       Norman was seen today for follow-up.    Diagnoses and all orders for this visit:    Type 2 diabetes mellitus with hyperglycemia, without long-term current use of insulin  -     Comprehensive Metabolic Panel; Future  -     Hemoglobin A1C; Future  -     CBC Auto Differential; Future  - On Metformin     Hyperlipidemia, unspecified hyperlipidemia type  -     Lipid Panel; Future    Trigeminal neuralgia   - On Trileptal     Hearing difficulty, unspecified laterality   - Pending evaluation for hearing aid.    Benign prostatic hyperplasia with nocturia   - On Flomax with good rsults.    Other orders  -     varicella-zoster gE-AS01B, PF, (SHINGRIX, PF,) 50 mcg/0.5 mL injection; Inject 0.5 mLs into the muscle once. for 1 dose      Health Maintenance Due   Topic Date Due    TETANUS VACCINE  Never done        Return to clinic in 3 months.    Darshana Brandon MD  [facility]  Disclaimer:  This note has been generated using voice-recognition software. There may be grammatical or spelling errors that have been missed during proof-reading

## 2022-07-22 ENCOUNTER — PATIENT MESSAGE (OUTPATIENT)
Dept: PHARMACY | Facility: CLINIC | Age: 87
End: 2022-07-22
Payer: COMMERCIAL

## 2022-07-28 ENCOUNTER — OFFICE VISIT (OUTPATIENT)
Dept: UROLOGY | Facility: CLINIC | Age: 87
End: 2022-07-28
Payer: COMMERCIAL

## 2022-07-28 VITALS
SYSTOLIC BLOOD PRESSURE: 131 MMHG | BODY MASS INDEX: 27.47 KG/M2 | HEART RATE: 73 BPM | HEIGHT: 66 IN | DIASTOLIC BLOOD PRESSURE: 72 MMHG

## 2022-07-28 DIAGNOSIS — N40.1 BENIGN PROSTATIC HYPERPLASIA WITH NOCTURIA: Primary | ICD-10-CM

## 2022-07-28 DIAGNOSIS — R35.0 FREQUENCY OF URINATION: ICD-10-CM

## 2022-07-28 DIAGNOSIS — R35.1 BENIGN PROSTATIC HYPERPLASIA WITH NOCTURIA: Primary | ICD-10-CM

## 2022-07-28 LAB — POC RESIDUAL URINE VOLUME: 48 ML (ref 0–100)

## 2022-07-28 PROCEDURE — 99999 PR PBB SHADOW E&M-EST. PATIENT-LVL III: CPT | Mod: PBBFAC,,, | Performed by: UROLOGY

## 2022-07-28 PROCEDURE — 99213 PR OFFICE/OUTPT VISIT, EST, LEVL III, 20-29 MIN: ICD-10-PCS | Mod: S$GLB,,, | Performed by: UROLOGY

## 2022-07-28 PROCEDURE — 99213 OFFICE O/P EST LOW 20 MIN: CPT | Mod: S$GLB,,, | Performed by: UROLOGY

## 2022-07-28 PROCEDURE — 51798 POCT BLADDER SCAN: ICD-10-PCS | Mod: S$GLB,,, | Performed by: UROLOGY

## 2022-07-28 PROCEDURE — 51798 US URINE CAPACITY MEASURE: CPT | Mod: S$GLB,,, | Performed by: UROLOGY

## 2022-07-28 PROCEDURE — 99999 PR PBB SHADOW E&M-EST. PATIENT-LVL III: ICD-10-PCS | Mod: PBBFAC,,, | Performed by: UROLOGY

## 2022-07-28 NOTE — PROGRESS NOTES
Subjective:       Patient ID: Norman Saunders is a 89 y.o. male.    Chief Complaint: No chief complaint on file.     This is a 89 y.o.  male patient with BPH/nocturia, was going every 1.5 hours at night, Dr. Brandon then started tamsulosin and has improved.  Currently no nocturia.  AUA SS 4/1, PVR low.  No hematuria.  No  history.  No flank pain, N/V.  No h/o stones.      7/28/22: continues to do well on tamsulosin, minimal LUTs, PVR 48 cc    IPSS Questionnaire (AUA-SS):  Over the past month    1)  Incomplete Emptying - How often have you had a sensation of not emptying your bladder completely after you finish urinating?  0 - Not at all   2)  Frequency - How often have you had to urinate again less than two hours after you finished urinating? 1 - Less than 1 time in 5   3)  Intermittency - How often have you found you stopped and started again several times when you urinated?  0 - Not at all   4) Urgency - How difficult have you found it to postpone urination?  0 - Not at all   5) Weak Stream - How often have you had a weak urinary stream?  2 - Less than half the time   6) Straining  - How often have you had to push or strain to begin urination?  0 - Not at all   7) Nocturia - How many times did you most typically get up to urinate from the time you went to bed until the time you got up in the morning?  1 - 1 time   Total score:  0-7 mild, 8-19 moderate, 20-35 severe 4   QOL  1        LAST PSA  Lab Results   Component Value Date    PSA 0.67 09/29/2010    PSA 0.50 09/19/2008    PSA 0.6 06/01/2006       Lab Results   Component Value Date    CREATININE 1.1 04/21/2022       ---  Past Medical History:   Diagnosis Date    Cataract     COVID-19 01/27/2022    GERD (gastroesophageal reflux disease)     Trigeminal neuralgia     Type 2 diabetes mellitus with hyperglycemia, without long-term current use of insulin 5/26/2021       Past Surgical History:   Procedure Laterality Date    CATARACT EXTRACTION         Family  History   Problem Relation Age of Onset    No Known Problems Mother     No Known Problems Father     Amblyopia Neg Hx     Blindness Neg Hx     Cancer Neg Hx     Cataracts Neg Hx     Diabetes Neg Hx     Glaucoma Neg Hx     Hypertension Neg Hx     Macular degeneration Neg Hx     Retinal detachment Neg Hx     Strabismus Neg Hx     Stroke Neg Hx     Thyroid disease Neg Hx        Social History     Tobacco Use    Smoking status: Never Smoker    Smokeless tobacco: Never Used   Substance Use Topics    Alcohol use: No    Drug use: No       Current Outpatient Medications on File Prior to Visit   Medication Sig Dispense Refill    ascorbic acid, vitamin C, (VITAMIN C) 500 MG tablet Take 1 tablet (500 mg total) by mouth 2 (two) times daily. 30 tablet 0    aspirin (ECOTRIN) 81 MG EC tablet Take 1 tablet (81 mg total) by mouth once daily. 30 tablet 11    blood sugar diagnostic Strp To check BG 2 times daily, to use with insurance preferred meter 100 strip 11    blood-glucose meter kit To check BG 2 times daily, to use with insurance preferred meter 1 each 5    clotrimazole (LOTRIMIN) 1 % Soln Apply 4 drops to affected ear twice daily 10 mL 2    lancets Misc To check BG 2 times daily, to use with insurance preferred meter 100 each 11    metFORMIN (GLUCOPHAGE-XR) 500 MG ER 24hr tablet TOME JAK TABLETA TODOS LOS SWENSON CON EL DESAYUNO 90 tablet 3    omeprazole (PRILOSEC) 20 MG capsule Take 1 capsule (20 mg total) by mouth once daily. Para el reflujo. 90 capsule 3    OXcarbazepine (TRILEPTAL) 300 MG Tab Take 1 tablet (300 mg total) by mouth 2 (two) times daily. 180 tablet 3    tamsulosin (FLOMAX) 0.4 mg Cap Take 1 capsule (0.4 mg total) by mouth once daily. 30 capsule 11     No current facility-administered medications on file prior to visit.       Review of patient's allergies indicates:  No Known Allergies    Review of Systems   Constitutional: Negative for activity change, chills and fever.   HENT:  Negative for congestion.    Respiratory: Negative for cough, chest tightness and shortness of breath.    Cardiovascular: Negative for chest pain and palpitations.   Gastrointestinal: Negative for abdominal distention, abdominal pain, nausea and vomiting.   Genitourinary: Negative for difficulty urinating, flank pain, hematuria, penile pain, scrotal swelling and testicular pain.   Musculoskeletal: Negative for gait problem.       Objective:      Physical Exam  HENT:      Head: Atraumatic.   Pulmonary:      Effort: Pulmonary effort is normal.   Neurological:      General: No focal deficit present.      Mental Status: He is alert and oriented to person, place, and time.         Assessment:     Problem Noted   Benign Prostatic Hyperplasia With Nocturia 4/26/2022    AUA SS 4/1   PVR low  Tamsulosin     Frequency of Urination 4/26/2022       Plan:     1. Doing well on Tamsulosin, continue medication  2. Follow up in 6 months    Susana  #[id number] was used.        Florencio Ron MD

## 2022-09-26 ENCOUNTER — OFFICE VISIT (OUTPATIENT)
Dept: OTOLARYNGOLOGY | Facility: CLINIC | Age: 87
End: 2022-09-26
Payer: COMMERCIAL

## 2022-09-26 ENCOUNTER — CLINICAL SUPPORT (OUTPATIENT)
Dept: OTOLARYNGOLOGY | Facility: CLINIC | Age: 87
End: 2022-09-26
Payer: COMMERCIAL

## 2022-09-26 VITALS
HEART RATE: 67 BPM | WEIGHT: 169.19 LBS | HEIGHT: 66 IN | DIASTOLIC BLOOD PRESSURE: 75 MMHG | TEMPERATURE: 97 F | BODY MASS INDEX: 27.19 KG/M2 | SYSTOLIC BLOOD PRESSURE: 141 MMHG

## 2022-09-26 DIAGNOSIS — H90.3 SENSORINEURAL HEARING LOSS (SNHL) OF BOTH EARS: ICD-10-CM

## 2022-09-26 DIAGNOSIS — H61.21 IMPACTED CERUMEN OF RIGHT EAR: Chronic | ICD-10-CM

## 2022-09-26 DIAGNOSIS — H90.3 SENSORINEURAL HEARING LOSS (SNHL) OF BOTH EARS: Chronic | ICD-10-CM

## 2022-09-26 DIAGNOSIS — T16.2XXA FOREIGN BODY OF LEFT EAR, INITIAL ENCOUNTER: ICD-10-CM

## 2022-09-26 DIAGNOSIS — H60.63 CHRONIC OTITIS EXTERNA OF BOTH EARS, UNSPECIFIED TYPE: Primary | Chronic | ICD-10-CM

## 2022-09-26 PROCEDURE — 92555 SPEECH THRESHOLD AUDIOMETRY: CPT | Mod: S$GLB,,, | Performed by: AUDIOLOGIST

## 2022-09-26 PROCEDURE — 99999 PR PBB SHADOW E&M-EST. PATIENT-LVL IV: ICD-10-PCS | Mod: PBBFAC,,, | Performed by: OTOLARYNGOLOGY

## 2022-09-26 PROCEDURE — 1100F PTFALLS ASSESS-DOCD GE2>/YR: CPT | Mod: CPTII,S$GLB,, | Performed by: OTOLARYNGOLOGY

## 2022-09-26 PROCEDURE — 92553 AUDIOMETRY AIR & BONE: CPT | Mod: S$GLB,,, | Performed by: AUDIOLOGIST

## 2022-09-26 PROCEDURE — 1159F MED LIST DOCD IN RCRD: CPT | Mod: CPTII,S$GLB,, | Performed by: OTOLARYNGOLOGY

## 2022-09-26 PROCEDURE — 1126F AMNT PAIN NOTED NONE PRSNT: CPT | Mod: CPTII,S$GLB,, | Performed by: OTOLARYNGOLOGY

## 2022-09-26 PROCEDURE — 99999 PR PBB SHADOW E&M-EST. PATIENT-LVL I: CPT | Mod: PBBFAC,,, | Performed by: AUDIOLOGIST

## 2022-09-26 PROCEDURE — 69210 REMOVE IMPACTED EAR WAX UNI: CPT | Mod: 59,S$GLB,, | Performed by: OTOLARYNGOLOGY

## 2022-09-26 PROCEDURE — 3288F FALL RISK ASSESSMENT DOCD: CPT | Mod: CPTII,S$GLB,, | Performed by: OTOLARYNGOLOGY

## 2022-09-26 PROCEDURE — 99999 PR PBB SHADOW E&M-EST. PATIENT-LVL I: ICD-10-PCS | Mod: PBBFAC,,, | Performed by: AUDIOLOGIST

## 2022-09-26 PROCEDURE — 92553 PR AUDIOMETRY, AIR & BONE: ICD-10-PCS | Mod: S$GLB,,, | Performed by: AUDIOLOGIST

## 2022-09-26 PROCEDURE — 1126F PR PAIN SEVERITY QUANTIFIED, NO PAIN PRESENT: ICD-10-PCS | Mod: CPTII,S$GLB,, | Performed by: OTOLARYNGOLOGY

## 2022-09-26 PROCEDURE — 1100F PR PT FALLS ASSESS DOC 2+ FALLS/FALL W/INJURY/YR: ICD-10-PCS | Mod: CPTII,S$GLB,, | Performed by: OTOLARYNGOLOGY

## 2022-09-26 PROCEDURE — 92555 PR SPEECH THRESHOLD AUDIOMETRY: ICD-10-PCS | Mod: S$GLB,,, | Performed by: AUDIOLOGIST

## 2022-09-26 PROCEDURE — 99214 OFFICE O/P EST MOD 30 MIN: CPT | Mod: 25,S$GLB,, | Performed by: OTOLARYNGOLOGY

## 2022-09-26 PROCEDURE — 3288F PR FALLS RISK ASSESSMENT DOCUMENTED: ICD-10-PCS | Mod: CPTII,S$GLB,, | Performed by: OTOLARYNGOLOGY

## 2022-09-26 PROCEDURE — 92567 TYMPANOMETRY: CPT | Mod: S$GLB,,, | Performed by: AUDIOLOGIST

## 2022-09-26 PROCEDURE — 69200 FOREIGN BODY REMOVAL: ICD-10-PCS | Mod: LT,S$GLB,, | Performed by: OTOLARYNGOLOGY

## 2022-09-26 PROCEDURE — 69210 EAR CERUMEN REMOVAL: ICD-10-PCS | Mod: 59,S$GLB,, | Performed by: OTOLARYNGOLOGY

## 2022-09-26 PROCEDURE — 69200 CLEAR OUTER EAR CANAL: CPT | Mod: LT,S$GLB,, | Performed by: OTOLARYNGOLOGY

## 2022-09-26 PROCEDURE — 99999 PR PBB SHADOW E&M-EST. PATIENT-LVL IV: CPT | Mod: PBBFAC,,, | Performed by: OTOLARYNGOLOGY

## 2022-09-26 PROCEDURE — 1159F PR MEDICATION LIST DOCUMENTED IN MEDICAL RECORD: ICD-10-PCS | Mod: CPTII,S$GLB,, | Performed by: OTOLARYNGOLOGY

## 2022-09-26 PROCEDURE — 99214 PR OFFICE/OUTPT VISIT, EST, LEVL IV, 30-39 MIN: ICD-10-PCS | Mod: 25,S$GLB,, | Performed by: OTOLARYNGOLOGY

## 2022-09-26 PROCEDURE — 92567 PR TYMPA2METRY: ICD-10-PCS | Mod: S$GLB,,, | Performed by: AUDIOLOGIST

## 2022-09-26 RX ORDER — FLUOCINOLONE ACETONIDE 0.11 MG/ML
3 OIL AURICULAR (OTIC) 2 TIMES DAILY
Qty: 20 ML | Refills: 3 | Status: SHIPPED | OUTPATIENT
Start: 2022-09-26

## 2022-09-26 NOTE — PROCEDURES
Foreign Body Removal    Date/Time: 9/26/2022 10:00 AM  Performed by: Vickie Rivera MD  Authorized by: Vickie Rivera MD   Body area: ear  Location details: left ear  Anesthesia method: None.    Patient sedated: no  Patient restrained: no  Patient cooperative: yes  Localization method: magnification  Removal mechanism: alligator forceps  Complexity: simple  1 objects recovered.  Objects recovered: cotton end of qtip  Post-procedure assessment: foreign body removed  Patient tolerance: Patient tolerated the procedure well with no immediate complications    Vickie Rivera MD  [facility]

## 2022-09-26 NOTE — PATIENT INSTRUCTIONS
I would recommend the use of an over the counter moisturizing drop such as baby oil, mineral oil, Vitamin E oil, etc on a weekly basis.    You should avoid Qtip use in the ears.    If the ear feels wet, use a hairdryer on a cool setting to dry the ears.    Fluocinolone Oil (DermOtic) drops may have been prescribed and can be used daily or weekly as needed for itching.       Audiogram was reviewed in detail with the patient, and they understand their hearing loss.    If and when they are a candidate for hearing aids, they were given information on how to contact the audiologist for this appointment.  I recommend annual audiograms as well as hearing protection in noise.

## 2022-09-26 NOTE — PROGRESS NOTES
Chief Complaint   Patient presents with    Cerumen Impaction     Right ear     Hearing Loss    Tinnitus     Left ear sounds like childrens laughter      [facility]    HPI:  Patient is a 89 y.o. male who has previously seen me for cerumen impaction and chronic otitis externa, sensorineural hearing loss.   He is here for his routine hearing screen but was noted to have cerumen impaction bilaterally.      Since the last visit, the patient reports he has used Q-tips in the ears for itching.    Active Ambulatory Problems     Diagnosis Date Noted    Vertigo 03/01/2013    Trigeminal neuralgia 01/23/2014    GERD (gastroesophageal reflux disease) 02/10/2015    Pain, joint, shoulder region, right 02/10/2015    Low back pain 11/16/2015    Type 2 diabetes mellitus with hyperglycemia, without long-term current use of insulin 05/26/2021    Depression 05/26/2021    Acute respiratory failure due to COVID-19 01/27/2022    COVID-19 Multifocal pneumonia 01/27/2022    Hearing difficulty 04/22/2022    Hyperlipidemia 04/22/2022    Frequency of urination 04/26/2022    Benign prostatic hyperplasia with nocturia 04/26/2022     Resolved Ambulatory Problems     Diagnosis Date Noted    Dermatitis 03/01/2013     Past Medical History:   Diagnosis Date    Cataract     COVID-19 01/27/2022       Review of Systems  General: negative for chills, fever or weight loss  Psychological: negative for mood changes or depression  Ophthalmic: negative for blurry vision, photophobia or eye pain  ENT: see HPI  Respiratory: no cough, shortness of breath, or wheezing  Cardiovascular: no chest pain or dyspnea on exertion  Gastrointestinal: no abdominal pain, change in bowel habits, or black/ bloody stools  Musculoskeletal: negative for gait disturbance or muscular weakness  Neurological: no syncope or seizures; no ataxia  Dermatological: negative for pruritis, rash and jaundice  Hematologic/lymphatic: no easy  bruising, no new adenopathy    Physical Exam     Vitals:    09/26/22 0959   BP: (!) 141/75   Pulse: 67   Temp: 97 °F (36.1 °C)         Constitutional:   He is oriented to person, place, and time. Vital signs are normal. He appears well-developed and well-nourished. He appears alert. He is cooperative.  Non-toxic appearance. Normal speech.      Head:  Normocephalic and atraumatic. Salivary glands normal.  Facial strength is normal.      Ears:  Hearing normal to normal and whispered voice; external ear normal without scars, lesions, or masses; ear canal, tympanic membrane, and middle ear normal., right ear hearing normal to normal and whispered voice; external ear normal without scars, lesions, or masses; ear canal, tympanic membrane, and middle ear normal. and left ear hearing normal to normal and whispered voice; external ear normal without scars, lesions, or masses; ear canal, tympanic membrane, and middle ear normal..   Right Ear: Tympanic membrane is not perforated, not erythematous and not retracted. No middle ear effusion.   Left Ear: A foreign body (cotton/Q-tip) is present. Tympanic membrane is not perforated, not erythematous and not retracted.  No middle ear effusion.   EACs with cerumen impaction bilaterally    Nose:  Mucosal edema present. No rhinorrhea, septal deviation, nasal septal hematoma or polyps. No epistaxis. No turbinate masses and no turbinate hypertrophy (2+ size).  Right sinus exhibits no maxillary sinus tenderness and no frontal sinus tenderness. Left sinus exhibits no maxillary sinus tenderness and no frontal sinus tenderness.     Mouth/Throat  Oropharynx clear and moist without lesions or asymmetry, normal uvula midline, lips, teeth, and gums normal and oropharynx normal. Normal dentition. No uvula swelling or oral lesions. No oropharyngeal exudate, posterior oropharyngeal edema or posterior oropharyngeal erythema. +2.  Tonsils not present, tonsillar erythema, tonsillar hyperemia,  tonsillar exudate.  Mirror exam not performed due to patient tolerance.  Mirror exam not performed due to patient tolerance.      Neck:  Neck normal without thyromegaly masses, asymmetry, normal tracheal structure, crepitus, and tenderness, thyroid normal, trachea normal, full range of motion with neck supple and no adenopathy. No JVD present. Carotid bruit is not present. Thyroid tenderness is present. No edema and no erythema present. No thyroid mass and no thyromegaly present.     He has no cervical adenopathy.     Cardiovascular:    Normal rate, regular rhythm, normal heart sounds and rate and rhythm, heart sounds, and pulses normal.              Pulmonary/Chest:   Effort and breath sounds normal.     Psychiatric:   He has a normal mood and affect. His speech is normal and behavior is normal.     Neurological:   He is alert and oriented to person, place, and time. He has neurological normal, alert and oriented. No cranial nerve deficit.     Skin:   No abrasions, lacerations, lesions, or rashes.       See separate note for CI removal and FB removal from ear:     Audiogram:  Interpreted by me and reviewed with the patient today.  Slightly worse overall than previous, but generally unchanged.            Assessment/Plan:      ICD-10-CM ICD-9-CM    1. Chronic otitis externa of both ears, unspecified type  H60.63 380.23 fluocinolone acetonide oiL (DERMOTIC OIL) 0.01 % Drop      2. Sensorineural hearing loss (SNHL) of both ears  H90.3 389.18 Ambulatory referral/consult to ENT      Ambulatory referral/consult to ENT      3. Foreign body of left ear, initial encounter  T16.2XXA 931      E915       4. Impacted cerumen of right ear  H61.21 380.4             Discontinue Q-tip use.  Derm otic ear drops given for patient to use to prevent itching and chronic otitis externa symptoms.    Hearing protection in noise and annual audiograms.  Hearing aid consultation if desired.    Follow-up in 1 year or sooner if needed.    Vickie  CAROLINE Rivera MD  [facility]

## 2022-09-26 NOTE — PROCEDURES
Ear Cerumen Removal    Date/Time: 9/26/2022 10:00 AM  Performed by: Vickie Rivera MD  Authorized by: Vickie Rivera MD     Consent Done?:  Yes (Verbal)    Local anesthetic:  None  Location details:  Right ear  Procedure type: curette    Procedure type comment:  Suction  Cerumen  Removal Results:  Cerumen completely removed  Patient tolerance:  Patient tolerated the procedure well with no immediate complications     Dry skin and skin irritation bilateral ear canals, consistent with chronic otitis externa.  Vickie Rivera MD  [facility]

## 2022-09-26 NOTE — PROGRESS NOTES
Norman Saunders was seen today in the clinic for an audiologic evaluation.  His main complaint was hearing loss.    Tympanometry revealed a Type A tympanogram for both ears.  Audiogram results revealed a mild sloping to severe sensorineural hearing loss bilaterally.  Speech detection thresholds were obtained at 25dB for the right ear and 20dB for the left ear.  Speech discrimination could not be completed because English is not his primary language.    Recommendations:  Otologic evaluation  Annual evaluation  Hearing aid consult  Hearing protection in noise

## 2022-09-27 ENCOUNTER — CLINICAL SUPPORT (OUTPATIENT)
Dept: OTOLARYNGOLOGY | Facility: CLINIC | Age: 87
End: 2022-09-27
Payer: COMMERCIAL

## 2022-09-27 DIAGNOSIS — H90.3 SENSORINEURAL HEARING LOSS, BILATERAL: Primary | ICD-10-CM

## 2022-09-27 PROCEDURE — 99499 NO LOS: ICD-10-PCS | Mod: S$GLB,,, | Performed by: AUDIOLOGIST

## 2022-09-27 PROCEDURE — 99499 UNLISTED E&M SERVICE: CPT | Mod: S$GLB,,, | Performed by: AUDIOLOGIST

## 2022-09-27 NOTE — PROGRESS NOTES
Norman Saunders was seen today in the clinic for a hearing aid consult.  He was able to demo a pair of Phonak Vertical[employer]o P90-R trial hearing aids during the consult.  With use of the video , we discussed the hearing aid technology levels and pricing.  He expressed that he was not able to afford any of the hearing aids and did not want to finish the consult appointment.  I provided him with a copy of his audiogram and the information to contact [facility] for possible assistance with hearing aids.  He will contact me with any further questions.

## 2022-09-29 ENCOUNTER — OFFICE VISIT (OUTPATIENT)
Dept: OPTOMETRY | Facility: CLINIC | Age: 87
End: 2022-09-29
Payer: COMMERCIAL

## 2022-09-29 DIAGNOSIS — H52.203 ASTIGMATISM OF BOTH EYES, UNSPECIFIED TYPE: ICD-10-CM

## 2022-09-29 DIAGNOSIS — Z96.1 PSEUDOPHAKIA OF BOTH EYES: ICD-10-CM

## 2022-09-29 DIAGNOSIS — Z98.42 S/P BILATERAL CATARACT EXTRACTION: ICD-10-CM

## 2022-09-29 DIAGNOSIS — H43.393 VITREOUS SYNERESIS OF BOTH EYES: ICD-10-CM

## 2022-09-29 DIAGNOSIS — Z13.5 SCREENING FOR EYE CONDITION: ICD-10-CM

## 2022-09-29 DIAGNOSIS — Z98.41 S/P BILATERAL CATARACT EXTRACTION: ICD-10-CM

## 2022-09-29 DIAGNOSIS — E11.9 TYPE 2 DIABETES MELLITUS WITHOUT RETINOPATHY: Primary | ICD-10-CM

## 2022-09-29 DIAGNOSIS — H43.393 VITREOUS FLOATERS OF BOTH EYES: ICD-10-CM

## 2022-09-29 PROCEDURE — 3288F FALL RISK ASSESSMENT DOCD: CPT | Mod: CPTII,S$GLB,, | Performed by: OPTOMETRIST

## 2022-09-29 PROCEDURE — 92014 PR EYE EXAM, EST PATIENT,COMPREHESV: ICD-10-PCS | Mod: S$GLB,,, | Performed by: OPTOMETRIST

## 2022-09-29 PROCEDURE — 1159F MED LIST DOCD IN RCRD: CPT | Mod: CPTII,S$GLB,, | Performed by: OPTOMETRIST

## 2022-09-29 PROCEDURE — 1126F PR PAIN SEVERITY QUANTIFIED, NO PAIN PRESENT: ICD-10-PCS | Mod: CPTII,S$GLB,, | Performed by: OPTOMETRIST

## 2022-09-29 PROCEDURE — 1100F PR PT FALLS ASSESS DOC 2+ FALLS/FALL W/INJURY/YR: ICD-10-PCS | Mod: CPTII,S$GLB,, | Performed by: OPTOMETRIST

## 2022-09-29 PROCEDURE — 1159F PR MEDICATION LIST DOCUMENTED IN MEDICAL RECORD: ICD-10-PCS | Mod: CPTII,S$GLB,, | Performed by: OPTOMETRIST

## 2022-09-29 PROCEDURE — 2023F DILAT RTA XM W/O RTNOPTHY: CPT | Mod: CPTII,S$GLB,, | Performed by: OPTOMETRIST

## 2022-09-29 PROCEDURE — 99999 PR PBB SHADOW E&M-EST. PATIENT-LVL III: CPT | Mod: PBBFAC,,, | Performed by: OPTOMETRIST

## 2022-09-29 PROCEDURE — 2023F PR DILATED RETINAL EXAM W/O EVID OF RETINOPATHY: ICD-10-PCS | Mod: CPTII,S$GLB,, | Performed by: OPTOMETRIST

## 2022-09-29 PROCEDURE — 92014 COMPRE OPH EXAM EST PT 1/>: CPT | Mod: S$GLB,,, | Performed by: OPTOMETRIST

## 2022-09-29 PROCEDURE — 3288F PR FALLS RISK ASSESSMENT DOCUMENTED: ICD-10-PCS | Mod: CPTII,S$GLB,, | Performed by: OPTOMETRIST

## 2022-09-29 PROCEDURE — 99999 PR PBB SHADOW E&M-EST. PATIENT-LVL III: ICD-10-PCS | Mod: PBBFAC,,, | Performed by: OPTOMETRIST

## 2022-09-29 PROCEDURE — 1126F AMNT PAIN NOTED NONE PRSNT: CPT | Mod: CPTII,S$GLB,, | Performed by: OPTOMETRIST

## 2022-09-29 PROCEDURE — 1100F PTFALLS ASSESS-DOCD GE2>/YR: CPT | Mod: CPTII,S$GLB,, | Performed by: OPTOMETRIST

## 2022-09-29 NOTE — PROGRESS NOTES
"HPI     eye examination             Comments: Annual eye examination and refraction.  Wears glasses full-time           Comments    89 yr old  male -  with  to translate             Approximate date of last eye examination: 08/24/2021  Name of last eye doctor seen:Dr. Worley    Wears glasses? yes     If yes, wears full-time or part-time?  Full time    Present glasses are bifocal / S V Distance / S V Reading:  bifocals  Approximate age of present glasses:  2 yrs  Got new glasses following last exam, or subsequently?:  No    Any problem with VA with glasses?    Don't see far well with the glasses       Wears CLs?:  no  Headaches? no  Eye pain/discomfort?no                                                                             Flashes?  No   Floaters?  Yes  Diplopia/Double vision?  no  Patient's Ocular History:         Any eye surgeries? S/p cataract sx OU         Any eye injury?  no         Any treatment for eye disease?  No            Family history of eye disease?  none  Significant patient medical history:         1. Diabetes?  no   2. HBP?  no              3. Other (describe):  GERD   ! OTC eyedrops currently using:  Yes Unknown brand    ! Prescription eye meds currently using:  no   ! Any history of allergy/adverse reaction to any eye meds used   previously?  no    ! Any history of allergy/adverse reaction to eyedrops used during prior   eye exam(s)? no    ! Any history of allergy/adverse reaction to Novacaine or similar meds?   Doesn't  know   ! Any history of allergy/adverse reaction to Epinephrine or similar meds?   Doesn't know    ! Patient okay with use of anesthetic eyedrops to check eye pressure? yes            ! Patient okay with use of eyedrops to dilate pupils today? yes    !  Allergies/Medications/Medical History/Family History reviewed today?    yes      PD =  64/60  Desired reading distance = 14"                                                                         Last " edited by Cristopher Redmond MA on 9/29/2022 11:26 AM.            Assessment /Plan     For exam results, see Encounter Report.    1. Type 2 diabetes mellitus without retinopathy        2. Vitreous syneresis of both eyes        3. Vitreous floaters of both eyes        4. S/P bilateral cataract extraction        5. Pseudophakia of both eyes        6. Astigmatism of both eyes, unspecified type        7. Screening for eye condition                       S/P cataract surgery in both eyes. Posterior chamber intraocular lens in each eye.  Syneresis of vitreous in each eye, with floaters in each eye.       Residual astigmatic refractive error in each eye, with very satisfactory best-corrected VA in each eye, per refraction done at last visit.  VA with present glasses comparable to that achieved with last refraction in each eye.  Refraction not repeated today      Updated (duplicate) spectacle lens Rx issued.     Recheck in one year - or prior if any problems noted in the interim.

## 2022-09-29 NOTE — PATIENT INSTRUCTIONS
S/P cataract surgery in both eyes. Posterior chamber intraocular lens in each eye.  Syneresis of vitreous in each eye, with floaters in each eye.      Type 2 diabetes without evidence of diabetic retinopathy.     Residual astigmatic refractive error in each eye, with very satisfactory best-corrected VA in each eye, per refraction done at last visit.  VA with present glasses comparable to that achieved with last refraction in each eye.  Refraction not repeated today      Updated (duplicate) spectacle lens Rx issued.     Recheck in one year - or prior if any problems noted in the interim.

## 2022-10-14 ENCOUNTER — PATIENT MESSAGE (OUTPATIENT)
Dept: FAMILY MEDICINE | Facility: CLINIC | Age: 87
End: 2022-10-14
Payer: COMMERCIAL

## 2022-10-18 ENCOUNTER — LAB VISIT (OUTPATIENT)
Dept: LAB | Facility: HOSPITAL | Age: 87
End: 2022-10-18
Attending: INTERNAL MEDICINE
Payer: COMMERCIAL

## 2022-10-18 DIAGNOSIS — E78.5 HYPERLIPIDEMIA, UNSPECIFIED HYPERLIPIDEMIA TYPE: ICD-10-CM

## 2022-10-18 DIAGNOSIS — E11.65 TYPE 2 DIABETES MELLITUS WITH HYPERGLYCEMIA, WITHOUT LONG-TERM CURRENT USE OF INSULIN: ICD-10-CM

## 2022-10-18 LAB
ALBUMIN SERPL BCP-MCNC: 3.8 G/DL (ref 3.5–5.2)
ALP SERPL-CCNC: 90 U/L (ref 55–135)
ALT SERPL W/O P-5'-P-CCNC: 14 U/L (ref 10–44)
ANION GAP SERPL CALC-SCNC: 11 MMOL/L (ref 8–16)
AST SERPL-CCNC: 15 U/L (ref 10–40)
BASOPHILS # BLD AUTO: 0.07 K/UL (ref 0–0.2)
BASOPHILS NFR BLD: 1 % (ref 0–1.9)
BILIRUB SERPL-MCNC: 0.4 MG/DL (ref 0.1–1)
BUN SERPL-MCNC: 13 MG/DL (ref 8–23)
CALCIUM SERPL-MCNC: 9.3 MG/DL (ref 8.7–10.5)
CHLORIDE SERPL-SCNC: 106 MMOL/L (ref 95–110)
CHOLEST SERPL-MCNC: 194 MG/DL (ref 120–199)
CHOLEST/HDLC SERPL: 4.3 {RATIO} (ref 2–5)
CO2 SERPL-SCNC: 22 MMOL/L (ref 23–29)
CREAT SERPL-MCNC: 1 MG/DL (ref 0.5–1.4)
DIFFERENTIAL METHOD: NORMAL
EOSINOPHIL # BLD AUTO: 0.4 K/UL (ref 0–0.5)
EOSINOPHIL NFR BLD: 5.2 % (ref 0–8)
ERYTHROCYTE [DISTWIDTH] IN BLOOD BY AUTOMATED COUNT: 13.6 % (ref 11.5–14.5)
EST. GFR  (NO RACE VARIABLE): >60 ML/MIN/1.73 M^2
ESTIMATED AVG GLUCOSE: 131 MG/DL (ref 68–131)
GLUCOSE SERPL-MCNC: 113 MG/DL (ref 70–110)
HBA1C MFR BLD: 6.2 % (ref 4–5.6)
HCT VFR BLD AUTO: 42.6 % (ref 40–54)
HDLC SERPL-MCNC: 45 MG/DL (ref 40–75)
HDLC SERPL: 23.2 % (ref 20–50)
HGB BLD-MCNC: 14.1 G/DL (ref 14–18)
IMM GRANULOCYTES # BLD AUTO: 0.01 K/UL (ref 0–0.04)
IMM GRANULOCYTES NFR BLD AUTO: 0.1 % (ref 0–0.5)
LDLC SERPL CALC-MCNC: 131 MG/DL (ref 63–159)
LYMPHOCYTES # BLD AUTO: 2.6 K/UL (ref 1–4.8)
LYMPHOCYTES NFR BLD: 35.8 % (ref 18–48)
MCH RBC QN AUTO: 27.9 PG (ref 27–31)
MCHC RBC AUTO-ENTMCNC: 33.1 G/DL (ref 32–36)
MCV RBC AUTO: 84 FL (ref 82–98)
MONOCYTES # BLD AUTO: 0.5 K/UL (ref 0.3–1)
MONOCYTES NFR BLD: 7.4 % (ref 4–15)
NEUTROPHILS # BLD AUTO: 3.7 K/UL (ref 1.8–7.7)
NEUTROPHILS NFR BLD: 50.5 % (ref 38–73)
NONHDLC SERPL-MCNC: 149 MG/DL
NRBC BLD-RTO: 0 /100 WBC
PLATELET # BLD AUTO: 220 K/UL (ref 150–450)
PMV BLD AUTO: 10.8 FL (ref 9.2–12.9)
POTASSIUM SERPL-SCNC: 4.1 MMOL/L (ref 3.5–5.1)
PROT SERPL-MCNC: 7.6 G/DL (ref 6–8.4)
RBC # BLD AUTO: 5.05 M/UL (ref 4.6–6.2)
SODIUM SERPL-SCNC: 139 MMOL/L (ref 136–145)
TRIGL SERPL-MCNC: 90 MG/DL (ref 30–150)
WBC # BLD AUTO: 7.29 K/UL (ref 3.9–12.7)

## 2022-10-18 PROCEDURE — 80061 LIPID PANEL: CPT | Performed by: INTERNAL MEDICINE

## 2022-10-18 PROCEDURE — 36415 COLL VENOUS BLD VENIPUNCTURE: CPT | Performed by: INTERNAL MEDICINE

## 2022-10-18 PROCEDURE — 80053 COMPREHEN METABOLIC PANEL: CPT | Performed by: INTERNAL MEDICINE

## 2022-10-18 PROCEDURE — 83036 HEMOGLOBIN GLYCOSYLATED A1C: CPT | Performed by: INTERNAL MEDICINE

## 2022-10-18 PROCEDURE — 85025 COMPLETE CBC W/AUTO DIFF WBC: CPT | Performed by: INTERNAL MEDICINE

## 2022-10-20 ENCOUNTER — OFFICE VISIT (OUTPATIENT)
Dept: FAMILY MEDICINE | Facility: CLINIC | Age: 87
End: 2022-10-20
Payer: COMMERCIAL

## 2022-10-20 ENCOUNTER — PATIENT MESSAGE (OUTPATIENT)
Dept: FAMILY MEDICINE | Facility: CLINIC | Age: 87
End: 2022-10-20

## 2022-10-20 VITALS
WEIGHT: 169.56 LBS | SYSTOLIC BLOOD PRESSURE: 128 MMHG | HEIGHT: 66 IN | BODY MASS INDEX: 27.25 KG/M2 | OXYGEN SATURATION: 95 % | HEART RATE: 90 BPM | DIASTOLIC BLOOD PRESSURE: 64 MMHG

## 2022-10-20 DIAGNOSIS — E11.65 TYPE 2 DIABETES MELLITUS WITH HYPERGLYCEMIA, WITHOUT LONG-TERM CURRENT USE OF INSULIN: ICD-10-CM

## 2022-10-20 DIAGNOSIS — J10.1 INFLUENZA A VIRUS PRESENT: ICD-10-CM

## 2022-10-20 DIAGNOSIS — J06.9 URTI (ACUTE UPPER RESPIRATORY INFECTION): Primary | ICD-10-CM

## 2022-10-20 DIAGNOSIS — R63.0 POOR APPETITE: ICD-10-CM

## 2022-10-20 DIAGNOSIS — E78.5 HYPERLIPIDEMIA, UNSPECIFIED HYPERLIPIDEMIA TYPE: ICD-10-CM

## 2022-10-20 LAB
CTP QC/QA: YES
CTP QC/QA: YES
FLUAV AG NPH QL: POSITIVE
FLUBV AG NPH QL: NEGATIVE
SARS-COV-2 RDRP RESP QL NAA+PROBE: NEGATIVE

## 2022-10-20 PROCEDURE — 99214 PR OFFICE/OUTPT VISIT, EST, LEVL IV, 30-39 MIN: ICD-10-PCS | Mod: S$GLB,,, | Performed by: INTERNAL MEDICINE

## 2022-10-20 PROCEDURE — 1101F PR PT FALLS ASSESS DOC 0-1 FALLS W/OUT INJ PAST YR: ICD-10-PCS | Mod: CPTII,S$GLB,, | Performed by: INTERNAL MEDICINE

## 2022-10-20 PROCEDURE — 3288F FALL RISK ASSESSMENT DOCD: CPT | Mod: CPTII,S$GLB,, | Performed by: INTERNAL MEDICINE

## 2022-10-20 PROCEDURE — 1159F MED LIST DOCD IN RCRD: CPT | Mod: CPTII,S$GLB,, | Performed by: INTERNAL MEDICINE

## 2022-10-20 PROCEDURE — 1101F PT FALLS ASSESS-DOCD LE1/YR: CPT | Mod: CPTII,S$GLB,, | Performed by: INTERNAL MEDICINE

## 2022-10-20 PROCEDURE — 1160F RVW MEDS BY RX/DR IN RCRD: CPT | Mod: CPTII,S$GLB,, | Performed by: INTERNAL MEDICINE

## 2022-10-20 PROCEDURE — 1126F PR PAIN SEVERITY QUANTIFIED, NO PAIN PRESENT: ICD-10-PCS | Mod: CPTII,S$GLB,, | Performed by: INTERNAL MEDICINE

## 2022-10-20 PROCEDURE — 87635 SARS-COV-2 COVID-19 AMP PRB: CPT | Mod: QW,S$GLB,, | Performed by: INTERNAL MEDICINE

## 2022-10-20 PROCEDURE — 99214 OFFICE O/P EST MOD 30 MIN: CPT | Mod: S$GLB,,, | Performed by: INTERNAL MEDICINE

## 2022-10-20 PROCEDURE — 87804 POCT INFLUENZA A/B: ICD-10-PCS | Mod: QW,S$GLB,, | Performed by: INTERNAL MEDICINE

## 2022-10-20 PROCEDURE — 1126F AMNT PAIN NOTED NONE PRSNT: CPT | Mod: CPTII,S$GLB,, | Performed by: INTERNAL MEDICINE

## 2022-10-20 PROCEDURE — 99999 PR PBB SHADOW E&M-EST. PATIENT-LVL III: CPT | Mod: PBBFAC,,, | Performed by: INTERNAL MEDICINE

## 2022-10-20 PROCEDURE — 1159F PR MEDICATION LIST DOCUMENTED IN MEDICAL RECORD: ICD-10-PCS | Mod: CPTII,S$GLB,, | Performed by: INTERNAL MEDICINE

## 2022-10-20 PROCEDURE — 3288F PR FALLS RISK ASSESSMENT DOCUMENTED: ICD-10-PCS | Mod: CPTII,S$GLB,, | Performed by: INTERNAL MEDICINE

## 2022-10-20 PROCEDURE — 99999 PR PBB SHADOW E&M-EST. PATIENT-LVL III: ICD-10-PCS | Mod: PBBFAC,,, | Performed by: INTERNAL MEDICINE

## 2022-10-20 PROCEDURE — 1160F PR REVIEW ALL MEDS BY PRESCRIBER/CLIN PHARMACIST DOCUMENTED: ICD-10-PCS | Mod: CPTII,S$GLB,, | Performed by: INTERNAL MEDICINE

## 2022-10-20 PROCEDURE — 87635: ICD-10-PCS | Mod: QW,S$GLB,, | Performed by: INTERNAL MEDICINE

## 2022-10-20 PROCEDURE — 87804 INFLUENZA ASSAY W/OPTIC: CPT | Mod: QW,S$GLB,, | Performed by: INTERNAL MEDICINE

## 2022-10-20 RX ORDER — OSELTAMIVIR PHOSPHATE 75 MG/1
75 CAPSULE ORAL 2 TIMES DAILY
Qty: 10 CAPSULE | Refills: 0 | Status: SHIPPED | OUTPATIENT
Start: 2022-10-20 | End: 2022-10-25

## 2022-10-20 RX ORDER — FLUTICASONE PROPIONATE 50 MCG
2 SPRAY, SUSPENSION (ML) NASAL DAILY
Qty: 16 G | Refills: 2 | Status: SHIPPED | OUTPATIENT
Start: 2022-10-20 | End: 2023-05-12 | Stop reason: SDUPTHER

## 2022-10-20 RX ORDER — PROMETHAZINE HYDROCHLORIDE 6.25 MG/5ML
6.25 SYRUP ORAL EVERY 6 HOURS PRN
Qty: 120 ML | Refills: 0 | Status: SHIPPED | OUTPATIENT
Start: 2022-10-20 | End: 2023-05-12

## 2022-10-20 NOTE — PROGRESS NOTES
Subjective:       Patient ID: Norman Saunders is a 89 y.o. male.    Chief Complaint: Cough (2 days )      HPI  Norman Saunders is a 89 y.o. male with chronic conditions of diabetes mellitus type 2, depression, trigeminal neuralgia, GERD who presents today for routine visit but also upper respiratory tract symptoms.    Reports in the last 2 days has been having a cough that is a little productive and mostly at night.  Having trouble sleeping due to the cough.  Also reports chest pain with coughing and nasal congestion.  Denies headaches, fever, earache, or leg swelling.  No shortness of breath.  His son tested positive for the flu 4 days ago.  The patient has not had the flu shot.    Recent labs with no leukocytosis, normal kidney and liver function, and stable diabetes with improved A1c.  Lipid profile improved.    Health Maintenance:  Health Maintenance   Topic Date Due    TETANUS VACCINE  Never done    Hemoglobin A1c  04/18/2023    Eye Exam  09/29/2023    Lipid Panel  10/18/2023       Review of Systems   Constitutional: Negative.  Negative for fever and unexpected weight change.   HENT:  Positive for nasal congestion. Negative for ear pain and sore throat.    Respiratory:  Positive for cough. Negative for shortness of breath.    Cardiovascular:  Positive for chest pain (When he coughs). Negative for leg swelling.   Gastrointestinal: Negative.    Genitourinary:  Negative for difficulty urinating.   Musculoskeletal: Negative.    Integumentary:  Negative.   Neurological: Negative.  Negative for headaches.   Psychiatric/Behavioral: Negative.      Past Medical History:   Diagnosis Date    Cataract     COVID-19 01/27/2022    GERD (gastroesophageal reflux disease)     Trigeminal neuralgia     Type 2 diabetes mellitus with hyperglycemia, without long-term current use of insulin 5/26/2021       Past Surgical History:   Procedure Laterality Date    CATARACT EXTRACTION         Family History   Problem Relation Age of Onset     No Known Problems Mother     No Known Problems Father     Amblyopia Neg Hx     Blindness Neg Hx     Cancer Neg Hx     Cataracts Neg Hx     Diabetes Neg Hx     Glaucoma Neg Hx     Hypertension Neg Hx     Macular degeneration Neg Hx     Retinal detachment Neg Hx     Strabismus Neg Hx     Stroke Neg Hx     Thyroid disease Neg Hx        Social History     Socioeconomic History    Marital status:    Tobacco Use    Smoking status: Never    Smokeless tobacco: Never   Substance and Sexual Activity    Alcohol use: No    Drug use: No       Current Outpatient Medications   Medication Sig Dispense Refill    ascorbic acid, vitamin C, (VITAMIN C) 500 MG tablet Take 1 tablet (500 mg total) by mouth 2 (two) times daily. 30 tablet 0    aspirin (ECOTRIN) 81 MG EC tablet Take 1 tablet (81 mg total) by mouth once daily. 30 tablet 11    blood sugar diagnostic Strp To check BG 2 times daily, to use with insurance preferred meter 100 strip 11    blood-glucose meter kit To check BG 2 times daily, to use with insurance preferred meter 1 each 5    clotrimazole (LOTRIMIN) 1 % Soln Apply 4 drops to affected ear twice daily 10 mL 2    fluocinolone acetonide oiL (DERMOTIC OIL) 0.01 % Drop Place 3 drops in ear(s) 2 (two) times daily. 20 mL 3    lancets Misc To check BG 2 times daily, to use with insurance preferred meter 100 each 11    metFORMIN (GLUCOPHAGE-XR) 500 MG ER 24hr tablet TOME JAK TABLETA TODOS LOS SWENSON CON EL DESAYUNO 90 tablet 3    omeprazole (PRILOSEC) 20 MG capsule Take 1 capsule (20 mg total) by mouth once daily. Para el reflujo. 90 capsule 3    OXcarbazepine (TRILEPTAL) 300 MG Tab Take 1 tablet (300 mg total) by mouth 2 (two) times daily. 180 tablet 3    tamsulosin (FLOMAX) 0.4 mg Cap Take 1 capsule (0.4 mg total) by mouth once daily. 30 capsule 11     No current facility-administered medications for this visit.       Review of patient's allergies indicates:  No Known Allergies      Objective:       Last 3 sets of  Vitals    Vitals - 1 value per visit 9/29/2022 10/20/2022 10/20/2022   SYSTOLIC - - 128   DIASTOLIC - - 64   Pulse - - 90   Temp - - -   Resp - - -   SPO2 - - 95   Weight (lb) - - 169.53   Weight (kg) - - 76.9   Height - - 66   BMI (Calculated) - - 27.4   VISIT REPORT - - -   Pain Score  0 0 -   Some recent data might be hidden   Physical Exam  Constitutional:       General: He is not in acute distress.  HENT:      Head: Normocephalic.      Right Ear: Tympanic membrane, ear canal and external ear normal.      Left Ear: Tympanic membrane, ear canal and external ear normal.      Nose: Congestion present.      Mouth/Throat:      Mouth: Mucous membranes are moist.      Pharynx: Posterior oropharyngeal erythema present. No oropharyngeal exudate.      Comments: Postnasal drip present  Eyes:      General: No scleral icterus.     Extraocular Movements: Extraocular movements intact.      Conjunctiva/sclera: Conjunctivae normal.   Neck:      Vascular: No carotid bruit.      Comments: No goiter.  Cardiovascular:      Rate and Rhythm: Normal rate and regular rhythm.      Pulses: Normal pulses.      Heart sounds: Normal heart sounds.   Pulmonary:      Effort: Pulmonary effort is normal.      Breath sounds: Normal breath sounds. No wheezing.   Abdominal:      General: Bowel sounds are normal. There is no distension.      Palpations: Abdomen is soft. There is no mass.      Tenderness: There is no abdominal tenderness.   Musculoskeletal:         General: No swelling.   Lymphadenopathy:      Cervical: No cervical adenopathy.   Skin:     General: Skin is warm and dry.   Neurological:      General: No focal deficit present.      Mental Status: He is alert and oriented to person, place, and time.   Psychiatric:         Mood and Affect: Mood normal.         Behavior: Behavior normal.         CBC:  Recent Labs   Lab 02/01/22  0353 04/21/22  1432 10/18/22  0822   WBC 7.16 6.24 7.29   RBC 4.76 4.92 5.05   Hemoglobin 12.9 L 14.1 14.1    Hematocrit 39.4 L 42.1 42.6   Platelets 404 218 220   MCV 83 86 84   MCH 27.1 28.7 27.9   MCHC 32.7 33.5 33.1     CMP:  Recent Labs   Lab 02/11/22  1255 04/21/22  1432 10/18/22  0822   Glucose 117 H 125 H 113 H   Calcium 9.8 9.7 9.3   Albumin 3.1 L 4.2 3.8   Total Protein 7.6 8.0 7.6   Sodium 137 141 139   Potassium 4.2 4.1 4.1   CO2 23 24 22 L   Chloride 103 105 106   BUN 12 12 13   Creatinine 1.1 1.1 1.0   Alkaline Phosphatase 76 68 90   ALT 20 15 14   AST 18 17 15   Total Bilirubin 0.5 0.5 0.4     URINALYSIS:  Recent Labs   Lab 04/20/21  0835 01/28/22  0035 02/11/22  1221   Color, UA Yellow Yellow Yellow   Specific Gravity, UA >=1.030 A >1.030 A 1.030   pH, UA 6.0 7.0 6.0   Protein, UA 1+ A 2+ A 1+ A   Bacteria Rare None Rare   Nitrite, UA Negative Negative Negative   Leukocytes, UA Negative Negative Negative   Urobilinogen, UA Negative Negative 2.0-3.0 A   Hyaline Casts, UA 0 0 1      LIPIDS:  Recent Labs   Lab 04/20/21  0855 08/20/21  0742 02/11/22  1255 04/21/22  1432 10/18/22  0822   TSH 3.232  --  1.751 1.704  --    HDL 41 35 L  --  48 45   Cholesterol 219 H 182  --  224 H 194   Triglycerides 138 144  --  128 90   LDL Cholesterol 150.4 118.2  --  150.4 131.0   HDL/Cholesterol Ratio 18.7 L 19.2 L  --  21.4 23.2   Non-HDL Cholesterol 178 147  --  176 149   Total Cholesterol/HDL Ratio 5.3 H 5.2 H  --  4.7 4.3     TSH:  Recent Labs   Lab 04/20/21  0855 02/11/22  1255 04/21/22  1432   TSH 3.232 1.751 1.704       A1C:  Recent Labs   Lab 04/20/21  0855 08/20/21  0742 02/11/22  1255 04/21/22  1432 10/18/22  0822   Hemoglobin A1C 8.3 H 6.6 H 6.9 H 6.6 H 6.2 H       Imaging:  X-Ray Lumbar Spine AP And Lateral  Narrative: EXAMINATION:  XR LUMBAR SPINE AP AND LATERAL    CLINICAL HISTORY:  Low back pain, unspecified    TECHNIQUE:  AP, lateral and spot images were performed of the lumbar spine.    COMPARISON:  Lumbar spine, 06/01/2006    FINDINGS:  Bony structures appear in tact with generalized spondylosis with  marginal osteophytes and disc space narrowing throughout.  Alignment is maintained.  Pedicles demonstrate no destruction  Impression: Increasing spondylosis throughout lumbar spine with no evidence of acute fracture.    Electronically signed by: Lázaro Barber  Date:    02/24/2022  Time:    10:09      Assessment:       1. URTI (acute upper respiratory infection)    2. Influenza A virus present    3. Hyperlipidemia, unspecified hyperlipidemia type    4. Type 2 diabetes mellitus with hyperglycemia, without long-term current use of insulin    5. Poor appetite            Plan:       1. URTI (acute upper respiratory infection)  -     POCT COVID-19 Rapid Screening  -     POCT Influenza A/B  -     fluticasone propionate (FLONASE) 50 mcg/actuation nasal spray; 2 sprays (100 mcg total) by Each Nostril route once daily.  Dispense: 16 g; Refill: 2  -     promethazine (PHENERGAN) 6.25 mg/5 mL syrup; Take 5 mLs (6.25 mg total) by mouth every 6 (six) hours as needed (cough).  Dispense: 120 mL; Refill: 0  -     oseltamivir (TAMIFLU) 75 MG capsule; Take 1 capsule (75 mg total) by mouth 2 (two) times daily. for 5 days  Dispense: 10 capsule; Refill: 0  - Ok to keep using Mucinex DM. Can add Claritin as needed.  - Info attached to summary.     2. Influenza A virus present  -     POCT Influenza A/B  -     oseltamivir (TAMIFLU) 75 MG capsule; Take 1 capsule (75 mg total) by mouth 2 (two) times daily. for 5 days  Dispense: 10 capsule; Refill: 0    3. Hyperlipidemia, unspecified hyperlipidemia type   - Improved lipid profile.    4. Type 2 diabetes mellitus with hyperglycemia, without long-term current use of insulin   - Controlled with improve A1c.  Same treatment.    5. Poor appetite    - reports appetite has been about the same.  Weight is stable.  Continue to monitor.  Encourage supplements.      Health Maintenance Due   Topic Date Due    TETANUS VACCINE  Never done    COVID-19 Vaccine (4 - Booster for Pfizer series) 07/13/2022     Influenza Vaccine (1) 09/01/2022    Shingles Vaccine (2 of 2) 09/16/2022            Return to clinic as needed or in 6 months for routine follow up.    Darshana Brandon MD  [facility]  Disclaimer:  This note has been generated using voice-recognition software. There may be grammatical or spelling errors that have been missed during proof-reading

## 2022-12-10 ENCOUNTER — PATIENT MESSAGE (OUTPATIENT)
Dept: FAMILY MEDICINE | Facility: CLINIC | Age: 87
End: 2022-12-10
Payer: COMMERCIAL

## 2022-12-15 ENCOUNTER — TELEPHONE (OUTPATIENT)
Dept: NEUROLOGY | Facility: CLINIC | Age: 87
End: 2022-12-15
Payer: COMMERCIAL

## 2022-12-15 NOTE — TELEPHONE ENCOUNTER
With the assistance of an  (ID: [id number]), I left a VM to confirm patient appointment tomorrow, 12/16/22 at 1:00pm with Dr. Benitez.

## 2022-12-16 ENCOUNTER — OFFICE VISIT (OUTPATIENT)
Dept: NEUROLOGY | Facility: CLINIC | Age: 87
End: 2022-12-16
Payer: COMMERCIAL

## 2022-12-16 VITALS
WEIGHT: 169 LBS | SYSTOLIC BLOOD PRESSURE: 139 MMHG | HEIGHT: 66 IN | DIASTOLIC BLOOD PRESSURE: 80 MMHG | BODY MASS INDEX: 27.16 KG/M2 | HEART RATE: 69 BPM

## 2022-12-16 DIAGNOSIS — G50.0 LEFT-SIDED TRIGEMINAL NEURALGIA: Primary | ICD-10-CM

## 2022-12-16 DIAGNOSIS — R42 DIZZINESS: ICD-10-CM

## 2022-12-16 PROCEDURE — 99204 OFFICE O/P NEW MOD 45 MIN: CPT | Mod: S$GLB,,, | Performed by: PSYCHIATRY & NEUROLOGY

## 2022-12-16 PROCEDURE — 1101F PR PT FALLS ASSESS DOC 0-1 FALLS W/OUT INJ PAST YR: ICD-10-PCS | Mod: CPTII,S$GLB,, | Performed by: PSYCHIATRY & NEUROLOGY

## 2022-12-16 PROCEDURE — 1101F PT FALLS ASSESS-DOCD LE1/YR: CPT | Mod: CPTII,S$GLB,, | Performed by: PSYCHIATRY & NEUROLOGY

## 2022-12-16 PROCEDURE — 1160F RVW MEDS BY RX/DR IN RCRD: CPT | Mod: CPTII,S$GLB,, | Performed by: PSYCHIATRY & NEUROLOGY

## 2022-12-16 PROCEDURE — 99999 PR PBB SHADOW E&M-EST. PATIENT-LVL V: CPT | Mod: PBBFAC,,, | Performed by: PSYCHIATRY & NEUROLOGY

## 2022-12-16 PROCEDURE — 1125F AMNT PAIN NOTED PAIN PRSNT: CPT | Mod: CPTII,S$GLB,, | Performed by: PSYCHIATRY & NEUROLOGY

## 2022-12-16 PROCEDURE — 99204 PR OFFICE/OUTPT VISIT, NEW, LEVL IV, 45-59 MIN: ICD-10-PCS | Mod: S$GLB,,, | Performed by: PSYCHIATRY & NEUROLOGY

## 2022-12-16 PROCEDURE — 99999 PR PBB SHADOW E&M-EST. PATIENT-LVL V: ICD-10-PCS | Mod: PBBFAC,,, | Performed by: PSYCHIATRY & NEUROLOGY

## 2022-12-16 PROCEDURE — 1125F PR PAIN SEVERITY QUANTIFIED, PAIN PRESENT: ICD-10-PCS | Mod: CPTII,S$GLB,, | Performed by: PSYCHIATRY & NEUROLOGY

## 2022-12-16 PROCEDURE — 1160F PR REVIEW ALL MEDS BY PRESCRIBER/CLIN PHARMACIST DOCUMENTED: ICD-10-PCS | Mod: CPTII,S$GLB,, | Performed by: PSYCHIATRY & NEUROLOGY

## 2022-12-16 PROCEDURE — 1159F MED LIST DOCD IN RCRD: CPT | Mod: CPTII,S$GLB,, | Performed by: PSYCHIATRY & NEUROLOGY

## 2022-12-16 PROCEDURE — 1159F PR MEDICATION LIST DOCUMENTED IN MEDICAL RECORD: ICD-10-PCS | Mod: CPTII,S$GLB,, | Performed by: PSYCHIATRY & NEUROLOGY

## 2022-12-16 PROCEDURE — 3288F PR FALLS RISK ASSESSMENT DOCUMENTED: ICD-10-PCS | Mod: CPTII,S$GLB,, | Performed by: PSYCHIATRY & NEUROLOGY

## 2022-12-16 PROCEDURE — 3288F FALL RISK ASSESSMENT DOCD: CPT | Mod: CPTII,S$GLB,, | Performed by: PSYCHIATRY & NEUROLOGY

## 2022-12-16 NOTE — PROGRESS NOTES
[facility] NEUROLOGY  [facility], [place]    Date: 12/16/22  Patient Name: Norman Saunders   MRN: [id number]   PCP: Darshana Brandon  Referring Provider: Nuvia Montoya MD    *This visit was conducted with the assistance of professional translation services present via audio/video, Samantha [phone]*    Chief Complaint:  Left-sided trigeminal neuralgia  Subjective:   Patient seen in consultation at the request of Nuvia Montoya MD for the evaluation of TN. A copy of this note will be sent to the referring physician.        HPI:   Mr. Norman Saunders is a 89 y.o. male presenting alongside family member to discuss is better with left-sided trigeminal neuralgia.  The patient shares that he has been suffering with these types of facial pains for over 6 years.  He reports originally being on carbamazepine which was discontinued due to ineffectiveness.  He is tried gabapentin with no results.  He is currently taking oxcarbazepine 300 mg twice daily but reports absolutely no effect on the pain.  The patient expresses great frustration with this type of pain as it is affecting his ability to eat.  He reports that cold liquids cause intolerable, electric left facial pain; light touch also brings on worsened pain.  Most prominent in the V2 distribution followed by V3 with minor V1 involvement on the left.    Pain is present daily with waxing and waning lower level pains and at least a couple of episodes of severe electric pains daily.    Denies changes in vision or hearing.  No jaw claudication.    PAST MEDICAL HISTORY:  Past Medical History:   Diagnosis Date    Cataract     COVID-19 01/27/2022    GERD (gastroesophageal reflux disease)     Trigeminal neuralgia     Type 2 diabetes mellitus with hyperglycemia, without long-term current use of insulin 5/26/2021     Patient Active Problem List   Diagnosis    Vertigo    Trigeminal neuralgia    GERD (gastroesophageal reflux disease)    Pain, joint, shoulder region,  right    Low back pain    Type 2 diabetes mellitus with hyperglycemia, without long-term current use of insulin    Depression    Acute respiratory failure due to COVID-19    COVID-19 Multifocal pneumonia    Hearing difficulty    Hyperlipidemia    Frequency of urination    Benign prostatic hyperplasia with nocturia     PAST SURGICAL HISTORY:  Past Surgical History:   Procedure Laterality Date    CATARACT EXTRACTION         CURRENT MEDS:  Current Outpatient Medications   Medication Sig Dispense Refill    ascorbic acid, vitamin C, (VITAMIN C) 500 MG tablet Take 1 tablet (500 mg total) by mouth 2 (two) times daily. 30 tablet 0    aspirin (ECOTRIN) 81 MG EC tablet Take 1 tablet (81 mg total) by mouth once daily. 30 tablet 11    blood sugar diagnostic Strp To check BG 2 times daily, to use with insurance preferred meter 100 strip 11    blood-glucose meter kit To check BG 2 times daily, to use with insurance preferred meter 1 each 5    clotrimazole (LOTRIMIN) 1 % Soln Apply 4 drops to affected ear twice daily 10 mL 2    fluocinolone acetonide oiL (DERMOTIC OIL) 0.01 % Drop Place 3 drops in ear(s) 2 (two) times daily. 20 mL 3    fluticasone propionate (FLONASE) 50 mcg/actuation nasal spray 2 sprays (100 mcg total) by Each Nostril route once daily. 16 g 2    lancets Misc To check BG 2 times daily, to use with insurance preferred meter 100 each 11    metFORMIN (GLUCOPHAGE-XR) 500 MG ER 24hr tablet TOME JAK TABLETA TODOS LOS SWENSON CON EL DESAYUNO 90 tablet 3    omeprazole (PRILOSEC) 20 MG capsule Take 1 capsule (20 mg total) by mouth once daily. Para el reflujo. 90 capsule 3    OXcarbazepine (TRILEPTAL) 300 MG Tab Take 1 tablet (300 mg total) by mouth 2 (two) times daily. 180 tablet 3    promethazine (PHENERGAN) 6.25 mg/5 mL syrup Take 5 mLs (6.25 mg total) by mouth every 6 (six) hours as needed (cough). 120 mL 0    tamsulosin (FLOMAX) 0.4 mg Cap Take 1 capsule (0.4 mg total) by mouth once daily. 30 capsule 11     No current  "facility-administered medications for this visit.       ALLERGIES:  Review of patient's allergies indicates:  No Known Allergies    FAMILY HISTORY:  Family History   Problem Relation Age of Onset    No Known Problems Mother     No Known Problems Father     Amblyopia Neg Hx     Blindness Neg Hx     Cancer Neg Hx     Cataracts Neg Hx     Diabetes Neg Hx     Glaucoma Neg Hx     Hypertension Neg Hx     Macular degeneration Neg Hx     Retinal detachment Neg Hx     Strabismus Neg Hx     Stroke Neg Hx     Thyroid disease Neg Hx        SOCIAL HISTORY:  Social History     Tobacco Use    Smoking status: Never    Smokeless tobacco: Never   Substance Use Topics    Alcohol use: No    Drug use: No       Review of Systems:  Gen: no fever, no chills, no generalized feeling of weakness   HEENT: no double vision, no blurred vision, no eye pain, no eye exudates.   Heart: no chest pain, no SOB    Lungs: no SOB, no cough    MSK: no weakness of legs, intact ROM    ABD: no abd pain, no N/V/D/C, no difficulty with defecation.    Extremities: No leg pain, no edema.       Objective:     Vitals:    12/16/22 1336   BP: 139/80   BP Location: Left arm   Patient Position: Sitting   BP Method: Small (Automatic)   Pulse: 69   Weight: 76.7 kg (169 lb)   Height: 5' 6" (1.676 m)       General: male in NAD, alert and awake, Aox3, well groomed. ?    ? ?    HEENT: Head is NC/AT EOMI, pupil size: 4 mm B/L, no nystagmus noted; hearing grossly intact b/l. Mucous membrane moist, uvula midline, no pharyngeal erythema, exudates or discharges.     No external abnormalities of the face or skin.  No rashes or lesions.  No external discharge from the ear.  No obvious complications from dentition.     Neck: Supple. no nuchal rigidity.      Cardiovascular: well perfused, no cyanosis        Respiratory: Symmetric chest rise noted       Musculoskeletal: Muscle tone noted to be adequate for patient age, muscle mass is WNL. No spontaneous movements or fasciculations " noted during this examination.       Extremities: No pedal edema or calf tenderness. No cogwheel rigidity noted on B/L UE extremities.       Neurological Examination.    Mental status: AA&O x3; Affect/mood is euthymic/congruent; no aphasia noted during examination. Patient answers simple questions appropriately & follows simple commands; no dysarthria or expressive aphasia; no lesli-neglect or extinction. Vocabulary/word finding: excellent.       Cranial Nerves: II-XII grossly intact.      Muscle Function: Tone WNL and Muscle bulk WNL.  5/5 throughout     Sensory: ?Intact to light touch throughout     Reflexes:  2/4 and symmetric throughout     Other:  04/21/2017 MRI brain without contrast:  Findings:  Age-appropriate generalized cerebral volume loss with mild degree of scattered foci of increased T2/flair signal of the periventricular and supratentorial white matter, likely sequela of chronic microvascular ischemic change.  No intracranial hemorrhage or extraaxial fluid collection. No foci of abnormal parenchymal signal intensity. No mass or mass effect.  Mild compensatory prominence of the ventricles without evidence of hydrocephalus. No evidence of a large vascular territory infarct.  No acute infarct on diffusion weighted images.  A small vessel is seen arising from the basilar artery, likely the left superior cerebellar artery, which runs adjacent to the preganglionic segment of the left trigeminal nerve without evidence of significant mass effect.  A mucus retention cyst is seen in the left maxillary antrum.  The remaining visualized paranasal sinuses and mastoid air cells are clear. Postoperative changes of prior bilateral cataract surgery.  IMPRESSION:   1.  Noncontrast MRI demonstrates no evidence of acute intracranial pathology.  No significant abnormality to correlate with patient's clinical history of trigeminal neuralgia.  2.  Age-appropriate degenerative volume loss with mild degree of supratentorial  "and periventricular white matter changes suggestive of chronic microvascular ischemic disease."    Assessment:   Norman Saunders is a 89 y.o. male presenting for evaluation of left-sided trigeminal neuralgia with typical features.  The patient has now failed therapies with carbamazepine, oxcarbazepine, and gabapentin.  For this reason, we discussed advanced treatment options including gamma knife.  The patient is willing to speak with Neurosurgery to assess whether he is a candidate for the procedure.  In preparation for this consultation, we will update brain and intracranial vascular imaging with MRI and MRA.    The patient was prompted repeatedly regarding dizziness or vertigo.  He denies any current complaints.  He attributes the sensations going away during his treatment with gabapentin.  He was encouraged to follow-up with our clinic in the future if he develops new or worsening symptoms.    Plan:     Problem List Items Addressed This Visit          ENT    Vertigo     Other Visit Diagnoses       Left-sided trigeminal neuralgia    -  Primary    Relevant Orders    Ambulatory consult to Neurosurgery    MRI Brain Without Contrast    MRA Brain          - continue current medications  - referral to Neurosurgery for consideration of gamma knife  - MRI brain and MRA brain  - return to clinic in the future as needed    I spent a total of 45 minutes on the day of the visit. This includes face to face time and non-face to face time preparing to see the patient (eg, review of tests), obtaining and/or reviewing separately obtained history, documenting clinical information in the electronic or other health record, independently interpreting results and communicating results to the patient/family/caregiver, or care coordinator.    A dictation device was used to produce this document. Use of such devices sometimes results in grammatical errors or replacement of words that sound similarly.    Alcon Benitez, DO  "

## 2023-01-03 ENCOUNTER — TELEPHONE (OUTPATIENT)
Dept: NEUROLOGY | Facility: CLINIC | Age: 88
End: 2023-01-03
Payer: COMMERCIAL

## 2023-04-27 DIAGNOSIS — R35.1 BENIGN PROSTATIC HYPERPLASIA WITH NOCTURIA: ICD-10-CM

## 2023-04-27 DIAGNOSIS — R35.0 FREQUENCY OF URINATION: ICD-10-CM

## 2023-04-27 DIAGNOSIS — N40.1 BENIGN PROSTATIC HYPERPLASIA WITH NOCTURIA: ICD-10-CM

## 2023-04-28 RX ORDER — TAMSULOSIN HYDROCHLORIDE 0.4 MG/1
0.4 CAPSULE ORAL DAILY
Qty: 90 CAPSULE | Refills: 3 | Status: SHIPPED | OUTPATIENT
Start: 2023-04-28 | End: 2023-06-19

## 2023-05-02 DIAGNOSIS — E11.00 TYPE 2 DIABETES MELLITUS WITH HYPEROSMOLARITY WITHOUT COMA, WITHOUT LONG-TERM CURRENT USE OF INSULIN: ICD-10-CM

## 2023-05-02 NOTE — TELEPHONE ENCOUNTER
----- Message from Roxy Garibay sent at 5/2/2023  2:15 PM CDT -----  Type:  RX Refill Request    Who Called: Krista/ [facility]  Refill or New Rx: Rx   RX Name and Strength: metFORMIN (GLUCOPHAGE-XR) 500 MG ER 24hr tablet, omeprazole (PRILOSEC) 20 MG capsule,   How is the patient currently taking it? (ex. 1XDay):1  Is this a 30 day or 90 day RX:90  Preferred Pharmacy with phone number:[facility] - [place], [place] - [address]   Phone: [phone]  Fax:  [phone]  Would the patient rather a call back or a response via [facility]? Call back   Best Call Back Number:[phone]  Additional Information:

## 2023-05-02 NOTE — TELEPHONE ENCOUNTER
Care Due:                  Date            Visit Type   Department     Provider  --------------------------------------------------------------------------------                                EP -                              PRIMARY      [facility]  Last Visit: 10-      CARE ([facility])   MEDICINE       Darshana MAGALLONHART                              ANNUAL                              CHECKUP/PHY  [facility]  Next Visit: 05-      S            MEDICINE       Darshana Brandon                                                            Last  Test          Frequency    Reason                     Performed    Due Date  --------------------------------------------------------------------------------    HBA1C.......  6 months...  metFORMIN................  10-   04-    [employer] Embedded Care Due Messages. Reference number: [id number].   5/02/2023 2:57:42 PM CDT

## 2023-05-03 RX ORDER — METFORMIN HYDROCHLORIDE 500 MG/1
TABLET, EXTENDED RELEASE ORAL
Qty: 90 TABLET | Refills: 3 | Status: SHIPPED | OUTPATIENT
Start: 2023-05-03 | End: 2023-05-17

## 2023-05-12 ENCOUNTER — LAB VISIT (OUTPATIENT)
Dept: LAB | Facility: HOSPITAL | Age: 88
End: 2023-05-12
Attending: INTERNAL MEDICINE
Payer: MEDICARE

## 2023-05-12 ENCOUNTER — OFFICE VISIT (OUTPATIENT)
Dept: FAMILY MEDICINE | Facility: CLINIC | Age: 88
End: 2023-05-12
Payer: MEDICARE

## 2023-05-12 VITALS
BODY MASS INDEX: 27.1 KG/M2 | DIASTOLIC BLOOD PRESSURE: 62 MMHG | HEART RATE: 88 BPM | HEIGHT: 66 IN | WEIGHT: 168.63 LBS | OXYGEN SATURATION: 96 % | SYSTOLIC BLOOD PRESSURE: 124 MMHG

## 2023-05-12 DIAGNOSIS — R63.0 ANOREXIA: ICD-10-CM

## 2023-05-12 DIAGNOSIS — Z00.00 ROUTINE HEALTH MAINTENANCE: Primary | ICD-10-CM

## 2023-05-12 DIAGNOSIS — R63.0 POOR APPETITE: ICD-10-CM

## 2023-05-12 DIAGNOSIS — G50.0 TRIGEMINAL NEURALGIA: ICD-10-CM

## 2023-05-12 DIAGNOSIS — E11.65 TYPE 2 DIABETES MELLITUS WITH HYPERGLYCEMIA, WITHOUT LONG-TERM CURRENT USE OF INSULIN: ICD-10-CM

## 2023-05-12 DIAGNOSIS — E78.5 HYPERLIPIDEMIA, UNSPECIFIED HYPERLIPIDEMIA TYPE: ICD-10-CM

## 2023-05-12 DIAGNOSIS — L29.9 EAR ITCH: ICD-10-CM

## 2023-05-12 DIAGNOSIS — R05.3 CHRONIC COUGH: ICD-10-CM

## 2023-05-12 DIAGNOSIS — R05.9 COUGH, UNSPECIFIED TYPE: ICD-10-CM

## 2023-05-12 LAB
ALBUMIN SERPL BCP-MCNC: 4.2 G/DL (ref 3.5–5.2)
ALP SERPL-CCNC: 79 U/L (ref 55–135)
ALT SERPL W/O P-5'-P-CCNC: 16 U/L (ref 10–44)
ANION GAP SERPL CALC-SCNC: 10 MMOL/L (ref 8–16)
AST SERPL-CCNC: 18 U/L (ref 10–40)
BASOPHILS # BLD AUTO: 0.08 K/UL (ref 0–0.2)
BASOPHILS NFR BLD: 1.2 % (ref 0–1.9)
BILIRUB SERPL-MCNC: 0.5 MG/DL (ref 0.1–1)
BUN SERPL-MCNC: 21 MG/DL (ref 8–23)
CALCIUM SERPL-MCNC: 9.4 MG/DL (ref 8.7–10.5)
CHLORIDE SERPL-SCNC: 108 MMOL/L (ref 95–110)
CHOLEST SERPL-MCNC: 205 MG/DL (ref 120–199)
CHOLEST/HDLC SERPL: 4.2 {RATIO} (ref 2–5)
CO2 SERPL-SCNC: 22 MMOL/L (ref 23–29)
CREAT SERPL-MCNC: 1.1 MG/DL (ref 0.5–1.4)
DIFFERENTIAL METHOD: NORMAL
EOSINOPHIL # BLD AUTO: 0.2 K/UL (ref 0–0.5)
EOSINOPHIL NFR BLD: 3.6 % (ref 0–8)
ERYTHROCYTE [DISTWIDTH] IN BLOOD BY AUTOMATED COUNT: 14.5 % (ref 11.5–14.5)
EST. GFR  (NO RACE VARIABLE): >60 ML/MIN/1.73 M^2
ESTIMATED AVG GLUCOSE: 131 MG/DL (ref 68–131)
GLUCOSE SERPL-MCNC: 107 MG/DL (ref 70–110)
HBA1C MFR BLD: 6.2 % (ref 4–5.6)
HCT VFR BLD AUTO: 42.2 % (ref 40–54)
HDLC SERPL-MCNC: 49 MG/DL (ref 40–75)
HDLC SERPL: 23.9 % (ref 20–50)
HGB BLD-MCNC: 14 G/DL (ref 14–18)
IMM GRANULOCYTES # BLD AUTO: 0.01 K/UL (ref 0–0.04)
IMM GRANULOCYTES NFR BLD AUTO: 0.2 % (ref 0–0.5)
LDLC SERPL CALC-MCNC: 132.2 MG/DL (ref 63–159)
LYMPHOCYTES # BLD AUTO: 1.5 K/UL (ref 1–4.8)
LYMPHOCYTES NFR BLD: 23.1 % (ref 18–48)
MCH RBC QN AUTO: 27.7 PG (ref 27–31)
MCHC RBC AUTO-ENTMCNC: 33.2 G/DL (ref 32–36)
MCV RBC AUTO: 84 FL (ref 82–98)
MONOCYTES # BLD AUTO: 0.6 K/UL (ref 0.3–1)
MONOCYTES NFR BLD: 9.5 % (ref 4–15)
NEUTROPHILS # BLD AUTO: 4.2 K/UL (ref 1.8–7.7)
NEUTROPHILS NFR BLD: 62.4 % (ref 38–73)
NONHDLC SERPL-MCNC: 156 MG/DL
NRBC BLD-RTO: 0 /100 WBC
PLATELET # BLD AUTO: 220 K/UL (ref 150–450)
PMV BLD AUTO: 10 FL (ref 9.2–12.9)
POTASSIUM SERPL-SCNC: 4.1 MMOL/L (ref 3.5–5.1)
PROT SERPL-MCNC: 7.9 G/DL (ref 6–8.4)
RBC # BLD AUTO: 5.05 M/UL (ref 4.6–6.2)
SODIUM SERPL-SCNC: 140 MMOL/L (ref 136–145)
TRIGL SERPL-MCNC: 119 MG/DL (ref 30–150)
TSH SERPL DL<=0.005 MIU/L-ACNC: 1.5 UIU/ML (ref 0.4–4)
WBC # BLD AUTO: 6.66 K/UL (ref 3.9–12.7)

## 2023-05-12 PROCEDURE — 99397 PR PREVENTIVE VISIT,EST,65 & OVER: ICD-10-PCS | Mod: 25,S$GLB,, | Performed by: INTERNAL MEDICINE

## 2023-05-12 PROCEDURE — 85025 COMPLETE CBC W/AUTO DIFF WBC: CPT | Performed by: INTERNAL MEDICINE

## 2023-05-12 PROCEDURE — 90677 PCV20 VACCINE IM: CPT | Mod: S$GLB,,, | Performed by: INTERNAL MEDICINE

## 2023-05-12 PROCEDURE — 36415 COLL VENOUS BLD VENIPUNCTURE: CPT | Performed by: INTERNAL MEDICINE

## 2023-05-12 PROCEDURE — 1126F PR PAIN SEVERITY QUANTIFIED, NO PAIN PRESENT: ICD-10-PCS | Mod: CPTII,S$GLB,, | Performed by: INTERNAL MEDICINE

## 2023-05-12 PROCEDURE — 99999 PR PBB SHADOW E&M-EST. PATIENT-LVL III: ICD-10-PCS | Mod: PBBFAC,,, | Performed by: INTERNAL MEDICINE

## 2023-05-12 PROCEDURE — 84443 ASSAY THYROID STIM HORMONE: CPT | Performed by: INTERNAL MEDICINE

## 2023-05-12 PROCEDURE — 3072F PR LOW RISK FOR RETINOPATHY: ICD-10-PCS | Mod: CPTII,S$GLB,, | Performed by: INTERNAL MEDICINE

## 2023-05-12 PROCEDURE — G0009 PNEUMOCOCCAL CONJUGATE VACCINE 20-VALENT: ICD-10-PCS | Mod: S$GLB,,, | Performed by: INTERNAL MEDICINE

## 2023-05-12 PROCEDURE — 80053 COMPREHEN METABOLIC PANEL: CPT | Performed by: INTERNAL MEDICINE

## 2023-05-12 PROCEDURE — 99397 PER PM REEVAL EST PAT 65+ YR: CPT | Mod: 25,S$GLB,, | Performed by: INTERNAL MEDICINE

## 2023-05-12 PROCEDURE — 90677 PNEUMOCOCCAL CONJUGATE VACCINE 20-VALENT: ICD-10-PCS | Mod: S$GLB,,, | Performed by: INTERNAL MEDICINE

## 2023-05-12 PROCEDURE — 83036 HEMOGLOBIN GLYCOSYLATED A1C: CPT | Performed by: INTERNAL MEDICINE

## 2023-05-12 PROCEDURE — 99999 PR PBB SHADOW E&M-EST. PATIENT-LVL III: CPT | Mod: PBBFAC,,, | Performed by: INTERNAL MEDICINE

## 2023-05-12 PROCEDURE — 3072F LOW RISK FOR RETINOPATHY: CPT | Mod: CPTII,S$GLB,, | Performed by: INTERNAL MEDICINE

## 2023-05-12 PROCEDURE — G0009 ADMIN PNEUMOCOCCAL VACCINE: HCPCS | Mod: S$GLB,,, | Performed by: INTERNAL MEDICINE

## 2023-05-12 PROCEDURE — 1126F AMNT PAIN NOTED NONE PRSNT: CPT | Mod: CPTII,S$GLB,, | Performed by: INTERNAL MEDICINE

## 2023-05-12 PROCEDURE — 80061 LIPID PANEL: CPT | Performed by: INTERNAL MEDICINE

## 2023-05-12 RX ORDER — FLUTICASONE PROPIONATE 50 MCG
2 SPRAY, SUSPENSION (ML) NASAL DAILY
Qty: 16 G | Refills: 2 | Status: SHIPPED | OUTPATIENT
Start: 2023-05-12 | End: 2023-12-28

## 2023-05-12 RX ORDER — CYPROHEPTADINE HYDROCHLORIDE 4 MG/1
4 TABLET ORAL NIGHTLY
Qty: 30 TABLET | Refills: 2 | Status: SHIPPED | OUTPATIENT
Start: 2023-05-12 | End: 2023-08-14

## 2023-05-12 RX ORDER — ACETIC ACID 20.65 MG/ML
4 SOLUTION AURICULAR (OTIC) 3 TIMES DAILY PRN
Qty: 6 ML | Refills: 1 | Status: SHIPPED | OUTPATIENT
Start: 2023-05-12 | End: 2023-12-28 | Stop reason: SDUPTHER

## 2023-05-12 RX ORDER — BENZONATATE 200 MG/1
200 CAPSULE ORAL 3 TIMES DAILY PRN
Qty: 30 CAPSULE | Refills: 2 | Status: SHIPPED | OUTPATIENT
Start: 2023-05-12

## 2023-05-13 ENCOUNTER — PATIENT MESSAGE (OUTPATIENT)
Dept: FAMILY MEDICINE | Facility: CLINIC | Age: 88
End: 2023-05-13
Payer: MEDICARE

## 2023-05-13 NOTE — PROGRESS NOTES
Subjective:       Patient ID: Norman Saunders is a 89 y.o. male.    Chief Complaint: Annual Exam      HPI  Norman Saunders is a 89 y.o. male with history of diabetes mellitus type 2, depression, trigeminal neuralgia, GERD who presents today for Annual Exam    Patient reports has been feeling better.  He is here for routine evaluation.  He is neuralgia has not been as bothersome.  Only complaint is of poor appetite.  Mood has been good.    Also has been having persistent cough after upper respiratory tract infection.  No shortness of breath.  Possibly related to postnasal drip.  Ears have been itching.    Tries to stay active and walks for exercise.  His weight has been stable.  Lives with family.    Health Maintenance:  Health Maintenance   Topic Date Due    TETANUS VACCINE  Never done    Eye Exam  09/29/2023    Hemoglobin A1c  11/12/2023    Lipid Panel  05/12/2024       Review of Systems   Constitutional: Negative.  Negative for appetite change (poor appetite), fever and unexpected weight change.   HENT:  Positive for postnasal drip.         Ear itching   Respiratory:  Positive for cough.    Cardiovascular: Negative.    Gastrointestinal: Negative.    Genitourinary:  Negative for difficulty urinating.   Musculoskeletal: Negative.    Integumentary:  Negative.   Neurological: Negative.    Psychiatric/Behavioral: Negative.      Past Medical History:   Diagnosis Date    Cataract     COVID-19 01/27/2022    GERD (gastroesophageal reflux disease)     Trigeminal neuralgia     Type 2 diabetes mellitus with hyperglycemia, without long-term current use of insulin 5/26/2021       Past Surgical History:   Procedure Laterality Date    CATARACT EXTRACTION         Family History   Problem Relation Age of Onset    No Known Problems Mother     No Known Problems Father     Amblyopia Neg Hx     Blindness Neg Hx     Cancer Neg Hx     Cataracts Neg Hx     Diabetes Neg Hx     Glaucoma Neg Hx     Hypertension Neg Hx     Macular  degeneration Neg Hx     Retinal detachment Neg Hx     Strabismus Neg Hx     Stroke Neg Hx     Thyroid disease Neg Hx        Social History     Socioeconomic History    Marital status:    Tobacco Use    Smoking status: Never    Smokeless tobacco: Never   Substance and Sexual Activity    Alcohol use: No    Drug use: No       Current Outpatient Medications   Medication Sig Dispense Refill    blood sugar diagnostic Strp To check BG 2 times daily, to use with insurance preferred meter 100 strip 11    fluocinolone acetonide oiL (DERMOTIC OIL) 0.01 % Drop Place 3 drops in ear(s) 2 (two) times daily. 20 mL 3    lancets Misc To check BG 2 times daily, to use with insurance preferred meter 100 each 11    metFORMIN (GLUCOPHAGE-XR) 500 MG ER 24hr tablet TOME JAK TABLETA TODOS LOS SWENSON CON EL DESAYUNO 90 tablet 3    omeprazole (PRILOSEC) 20 MG capsule Take 1 capsule (20 mg total) by mouth once daily. Para el reflujo. 90 capsule 3    OXcarbazepine (TRILEPTAL) 300 MG Tab Take 1 tablet (300 mg total) by mouth 2 (two) times daily. 180 tablet 3    tamsulosin (FLOMAX) 0.4 mg Cap Take 1 capsule (0.4 mg total) by mouth once daily. 90 capsule 3    acetic acid (VOSOL) 2 % otic solution Place 4 drops into both ears 3 (three) times daily as needed (itching). 6 mL 1    aspirin (ECOTRIN) 81 MG EC tablet Take 1 tablet (81 mg total) by mouth once daily. 30 tablet 11    benzonatate (TESSALON) 200 MG capsule Take 1 capsule (200 mg total) by mouth 3 (three) times daily as needed for Cough. 30 capsule 2    blood-glucose meter kit To check BG 2 times daily, to use with insurance preferred meter 1 each 5    cyproheptadine (PERIACTIN) 4 mg tablet Take 1 tablet (4 mg total) by mouth nightly. 30 tablet 2    fluticasone propionate (FLONASE) 50 mcg/actuation nasal spray 2 sprays (100 mcg total) by Each Nostril route once daily. 16 g 2     No current facility-administered medications for this visit.       Review of patient's allergies indicates:  No  Known Allergies      Objective:       Last 3 sets of Vitals    Vitals - 1 value per visit 12/16/2022 5/12/2023 5/12/2023   SYSTOLIC 139 - 124   DIASTOLIC 80 - 62   Pulse 69 - 88   Temp - - -   Resp - - -   SPO2 - - 96   Weight (lb) 169 - 168.65   Weight (kg) 76.658 - 76.5   Height 66 - 66   BMI (Calculated) 27.3 - 27.2   VISIT REPORT - - -   Pain Score  - 0 -   Some recent data might be hidden   Physical Exam  Constitutional:       General: He is not in acute distress.     Appearance: He is normal weight.      Comments: Good spirits   HENT:      Head: Normocephalic.      Right Ear: Tympanic membrane, ear canal and external ear normal.      Left Ear: Tympanic membrane, ear canal and external ear normal.      Ears:      Comments: Wax present not impacted, no erythema.     Nose: Nose normal.      Mouth/Throat:      Mouth: Mucous membranes are moist.   Eyes:      General: No scleral icterus.     Extraocular Movements: Extraocular movements intact.      Conjunctiva/sclera: Conjunctivae normal.   Neck:      Vascular: No carotid bruit.      Comments: No goiter.  Cardiovascular:      Rate and Rhythm: Normal rate and regular rhythm.      Pulses: Normal pulses.      Heart sounds: Normal heart sounds.   Pulmonary:      Effort: Pulmonary effort is normal.      Breath sounds: Normal breath sounds.   Abdominal:      General: Bowel sounds are normal. There is no distension.      Palpations: Abdomen is soft. There is no mass.      Tenderness: There is no abdominal tenderness.   Musculoskeletal:         General: No swelling.   Lymphadenopathy:      Cervical: No cervical adenopathy.   Skin:     General: Skin is warm and dry.   Neurological:      General: No focal deficit present.      Mental Status: He is alert and oriented to person, place, and time.   Psychiatric:         Mood and Affect: Mood normal.         Behavior: Behavior normal.         CBC:  Recent Labs   Lab 04/21/22  1432 10/18/22  0822 05/12/23  1620   WBC 6.24 7.29 6.66    RBC 4.92 5.05 5.05   Hemoglobin 14.1 14.1 14.0   Hematocrit 42.1 42.6 42.2   Platelets 218 220 220   MCV 86 84 84   MCH 28.7 27.9 27.7   MCHC 33.5 33.1 33.2     CMP:  Recent Labs   Lab 04/21/22  1432 10/18/22  0822 05/12/23  1620   Glucose 125 H 113 H 107   Calcium 9.7 9.3 9.4   Albumin 4.2 3.8 4.2   Total Protein 8.0 7.6 7.9   Sodium 141 139 140   Potassium 4.1 4.1 4.1   CO2 24 22 L 22 L   Chloride 105 106 108   BUN 12 13 21   Creatinine 1.1 1.0 1.1   Alkaline Phosphatase 68 90 79   ALT 15 14 16   AST 17 15 18   Total Bilirubin 0.5 0.4 0.5     URINALYSIS:  Recent Labs   Lab 04/20/21  0835 01/28/22  0035 02/11/22  1221   Color, UA Yellow Yellow Yellow   Specific Gravity, UA >=1.030 A >1.030 A 1.030   pH, UA 6.0 7.0 6.0   Protein, UA 1+ A 2+ A 1+ A   Bacteria Rare None Rare   Nitrite, UA Negative Negative Negative   Leukocytes, UA Negative Negative Negative   Urobilinogen, UA Negative Negative 2.0-3.0 A   Hyaline Casts, UA 0 0 1      LIPIDS:  Recent Labs   Lab 02/11/22  1255 04/21/22  1432 10/18/22  0822 05/12/23  1620   TSH 1.751 1.704  --  1.498   HDL  --  48 45 49   Cholesterol  --  224 H 194 205 H   Triglycerides  --  128 90 119   LDL Cholesterol  --  150.4 131.0 132.2   HDL/Cholesterol Ratio  --  21.4 23.2 23.9   Non-HDL Cholesterol  --  176 149 156   Total Cholesterol/HDL Ratio  --  4.7 4.3 4.2     TSH:  Recent Labs   Lab 02/11/22  1255 04/21/22  1432 05/12/23  1620   TSH 1.751 1.704 1.498       A1C:  Recent Labs   Lab 04/20/21  0855 08/20/21  0742 02/11/22  1255 04/21/22  1432 10/18/22  0822 05/12/23  1620   Hemoglobin A1C 8.3 H 6.6 H 6.9 H 6.6 H 6.2 H 6.2 H       Imaging:  X-Ray Lumbar Spine AP And Lateral  Narrative: EXAMINATION:  XR LUMBAR SPINE AP AND LATERAL    CLINICAL HISTORY:  Low back pain, unspecified    TECHNIQUE:  AP, lateral and spot images were performed of the lumbar spine.    COMPARISON:  Lumbar spine, 06/01/2006    FINDINGS:  Bony structures appear in tact with generalized spondylosis with  marginal osteophytes and disc space narrowing throughout.  Alignment is maintained.  Pedicles demonstrate no destruction  Impression: Increasing spondylosis throughout lumbar spine with no evidence of acute fracture.    Electronically signed by: Lázaro Barber  Date:    02/24/2022  Time:    10:09      Assessment:       1. Routine health maintenance    2. Type 2 diabetes mellitus with hyperglycemia, without long-term current use of insulin    3. Hyperlipidemia, unspecified hyperlipidemia type    4. Poor appetite    5. Trigeminal neuralgia    6. Cough, unspecified type    7. Anorexia    8. Ear itch            Plan:       1. Routine health maintenance  -     Hemoglobin A1C; Future; Expected date: 05/12/2023  -     CBC Auto Differential; Future; Expected date: 05/12/2023  -     Comprehensive Metabolic Panel; Future; Expected date: 05/12/2023  -     Lipid Panel; Future; Expected date: 05/12/2023  -     TSH; Future; Expected date: 05/12/2023  -     Microalbumin/Creatinine Ratio, Urine  -     acetic acid (VOSOL) 2 % otic solution; Place 4 drops into both ears 3 (three) times daily as needed (itching).  Dispense: 6 mL; Refill: 1  -     benzonatate (TESSALON) 200 MG capsule; Take 1 capsule (200 mg total) by mouth 3 (three) times daily as needed for Cough.  Dispense: 30 capsule; Refill: 2  -     (In Office Administered) Pneumococcal Conjugate Vaccine (20 Valent) (IM)  - stable exam, healthy weight, mood appears better.    2. Type 2 diabetes mellitus with hyperglycemia, without long-term current use of insulin  -     Hemoglobin A1C; Future; Expected date: 05/12/2023  -     Comprehensive Metabolic Panel; Future; Expected date: 05/12/2023  -     Lipid Panel; Future; Expected date: 05/12/2023  -     Microalbumin/Creatinine Ratio, Urine  - on metformin    3. Hyperlipidemia, unspecified hyperlipidemia type   - lipid profile.    4. Poor appetite  -     TSH; Future; Expected date: 05/12/2023  -     cyproheptadine (PERIACTIN) 4 mg  tablet; Take 1 tablet (4 mg total) by mouth nightly.  Dispense: 30 tablet; Refill: 2- awake can give drowsiness and thirst.    5. Trigeminal neuralgia   - stable, same treatment.  Better than usual.    6. Cough, unspecified type  -     possible allergies.  - CBC Auto Differential; Future; Expected date: 05/12/2023  -     fluticasone propionate (FLONASE) 50 mcg/actuation nasal spray; 2 sprays (100 mcg total) by Each Nostril route once daily.  Dispense: 16 g; Refill: 2  -     benzonatate (TESSALON) 200 MG capsule; Take 1 capsule (200 mg total) by mouth 3 (three) times daily as needed for Cough.  Dispense: 30 capsule; Refill: 2    7. Anorexia  -     TSH; Future; Expected date: 05/12/2023    8. Ear itch  -     acetic acid (VOSOL) 2 % otic solution; Place 4 drops into both ears 3 (three) times daily as needed (itching).  Dispense: 6 mL; Refill: 1       Health Maintenance Due   Topic Date Due    TETANUS VACCINE  Never done    COVID-19 Vaccine (4 - Booster for Pfizer series) 07/13/2022    Shingles Vaccine (2 of 2) 09/16/2022    Diabetes Urine Screening  02/11/2023            Return to clinic in 6 months.    Darshana Brandon MD  [facility]  Disclaimer:  This note has been generated using voice-recognition software. There may be grammatical or spelling errors that have been missed during proof-reading

## 2023-05-17 DIAGNOSIS — E11.00 TYPE 2 DIABETES MELLITUS WITH HYPEROSMOLARITY WITHOUT COMA, WITHOUT LONG-TERM CURRENT USE OF INSULIN: ICD-10-CM

## 2023-05-17 DIAGNOSIS — K21.00 GASTROESOPHAGEAL REFLUX DISEASE WITH ESOPHAGITIS WITHOUT HEMORRHAGE: ICD-10-CM

## 2023-05-17 RX ORDER — METFORMIN HYDROCHLORIDE 500 MG/1
TABLET, EXTENDED RELEASE ORAL
Qty: 90 TABLET | Refills: 1 | Status: SHIPPED | OUTPATIENT
Start: 2023-05-17

## 2023-05-17 RX ORDER — OMEPRAZOLE 20 MG/1
20 CAPSULE, DELAYED RELEASE ORAL DAILY
Qty: 90 CAPSULE | Refills: 3 | Status: SHIPPED | OUTPATIENT
Start: 2023-05-17 | End: 2024-02-12 | Stop reason: SDUPTHER

## 2023-05-17 NOTE — TELEPHONE ENCOUNTER
No care due was identified.  [facility] Embedded Care Due Messages. Reference number: [id number].   5/17/2023 1:46:32 AM CDT

## 2023-05-17 NOTE — TELEPHONE ENCOUNTER
Refill Routing Note   Medication(s) are not appropriate for processing by [facility] for the following reason(s):      Drug-disease interaction: metFORMIN and Acute respiratory failure due to COVID-19    [facility] action(s):  Defer  Approve Care Due:  None identified          Appointments  past 12m or future 3m with PCP    Date Provider   Last Visit   5/12/2023 Darshana Brandon MD   Next Visit   Visit date not found Darshana Brandon MD   ED visits in past 90 days: 0        Note composed:4:18 AM 05/17/2023

## 2023-06-15 DIAGNOSIS — N40.1 BENIGN PROSTATIC HYPERPLASIA WITH NOCTURIA: ICD-10-CM

## 2023-06-15 DIAGNOSIS — R35.1 BENIGN PROSTATIC HYPERPLASIA WITH NOCTURIA: ICD-10-CM

## 2023-06-15 DIAGNOSIS — R35.0 FREQUENCY OF URINATION: ICD-10-CM

## 2023-06-19 ENCOUNTER — TELEPHONE (OUTPATIENT)
Dept: UROLOGY | Facility: CLINIC | Age: 88
End: 2023-06-19
Payer: MEDICARE

## 2023-06-19 RX ORDER — TAMSULOSIN HYDROCHLORIDE 0.4 MG/1
CAPSULE ORAL
Qty: 90 CAPSULE | Refills: 3 | Status: SHIPPED | OUTPATIENT
Start: 2023-06-19

## 2023-06-19 NOTE — TELEPHONE ENCOUNTER
Pharmacy said they just filled this one month ago. I'm not sure why we're getting this message. Please refuse or approve.

## 2023-06-19 NOTE — TELEPHONE ENCOUNTER
I called [facility] pharmacy and they said there are no issues with this medication and they have no outstanding questions. This was filled by [facility] one month ago.

## 2023-08-04 DIAGNOSIS — G50.0 TRIGEMINAL NEURALGIA: ICD-10-CM

## 2023-08-04 DIAGNOSIS — G50.0 LEFT-SIDED TRIGEMINAL NEURALGIA: ICD-10-CM

## 2023-08-04 RX ORDER — OXCARBAZEPINE 300 MG/1
TABLET, FILM COATED ORAL
Qty: 180 TABLET | Refills: 0 | Status: SHIPPED | OUTPATIENT
Start: 2023-08-04

## 2023-08-04 NOTE — TELEPHONE ENCOUNTER
Patient needs a visit with a new provider in the area. The patient has not been evaluated in clinic in in over a year and I have left that location. He needs to call to schedule an appointment in [place]. Please notify patient that I can fill x 1 month, but he will need to find someone else to fill after that.

## 2023-08-14 DIAGNOSIS — R63.0 POOR APPETITE: ICD-10-CM

## 2023-08-14 RX ORDER — CYPROHEPTADINE HYDROCHLORIDE 4 MG/1
TABLET ORAL
Qty: 90 TABLET | Refills: 3 | Status: SHIPPED | OUTPATIENT
Start: 2023-08-14 | End: 2023-12-28

## 2023-08-14 NOTE — TELEPHONE ENCOUNTER
Refill Routing Note     Refill Routing Note   Medication(s) are not appropriate for processing by [facility] for the following reason(s):      Medication outside of protocol    [facility] action(s):  Route Care Due:  Labs due            Appointments  past 12m or future 3m with PCP    Date Provider   Last Visit   5/12/2023 Darshana Brandon MD   Next Visit   Visit date not found Darshana Brandon MD   ED visits in past 90 days: 0        Note composed:4:47 AM 08/14/2023

## 2023-08-14 NOTE — TELEPHONE ENCOUNTER
Care Due:                  Date            Visit Type   Department     Provider  --------------------------------------------------------------------------------                                MYCHART                              ANNUAL                              CHECKUP/PHY  [facility]  Last Visit: 05-      [facility]       Darshana Brandon  Next Visit: None Scheduled  None         None Found                                                            Last  Test          Frequency    Reason                     Performed    Due Date  --------------------------------------------------------------------------------    HBA1C.......  6 months...  metFORMIN................  05- 11-    [facility] Embedded Care Due Messages. Reference number: [id number].   8/14/2023 12:14:35 AM CDT

## 2023-12-07 ENCOUNTER — PATIENT MESSAGE (OUTPATIENT)
Dept: FAMILY MEDICINE | Facility: CLINIC | Age: 88
End: 2023-12-07
Payer: MEDICARE

## 2023-12-07 ENCOUNTER — TELEPHONE (OUTPATIENT)
Dept: FAMILY MEDICINE | Facility: CLINIC | Age: 88
End: 2023-12-07
Payer: MEDICARE

## 2023-12-12 ENCOUNTER — PATIENT MESSAGE (OUTPATIENT)
Dept: FAMILY MEDICINE | Facility: CLINIC | Age: 88
End: 2023-12-12
Payer: MEDICARE

## 2023-12-13 RX ORDER — MECLIZINE HCL 12.5 MG 12.5 MG/1
12.5 TABLET ORAL 3 TIMES DAILY PRN
Qty: 60 TABLET | Refills: 1 | Status: SHIPPED | OUTPATIENT
Start: 2023-12-13

## 2023-12-28 ENCOUNTER — OFFICE VISIT (OUTPATIENT)
Dept: FAMILY MEDICINE | Facility: CLINIC | Age: 88
End: 2023-12-28
Payer: MEDICARE

## 2023-12-28 ENCOUNTER — PATIENT MESSAGE (OUTPATIENT)
Dept: FAMILY MEDICINE | Facility: CLINIC | Age: 88
End: 2023-12-28

## 2023-12-28 VITALS
HEART RATE: 89 BPM | SYSTOLIC BLOOD PRESSURE: 142 MMHG | HEIGHT: 66 IN | WEIGHT: 178.13 LBS | BODY MASS INDEX: 28.63 KG/M2 | OXYGEN SATURATION: 95 % | DIASTOLIC BLOOD PRESSURE: 68 MMHG

## 2023-12-28 DIAGNOSIS — H81.12 BENIGN PAROXYSMAL VERTIGO, LEFT EAR: ICD-10-CM

## 2023-12-28 DIAGNOSIS — K08.89 TOOTH ACHE: ICD-10-CM

## 2023-12-28 DIAGNOSIS — M54.89 BACK PAIN WITHOUT SCIATICA: ICD-10-CM

## 2023-12-28 DIAGNOSIS — L29.9 EAR ITCH: ICD-10-CM

## 2023-12-28 DIAGNOSIS — R29.6 MULTIPLE FALLS: ICD-10-CM

## 2023-12-28 DIAGNOSIS — E11.65 TYPE 2 DIABETES MELLITUS WITH HYPERGLYCEMIA, WITHOUT LONG-TERM CURRENT USE OF INSULIN: ICD-10-CM

## 2023-12-28 DIAGNOSIS — F32.1 CURRENT MODERATE EPISODE OF MAJOR DEPRESSIVE DISORDER, UNSPECIFIED WHETHER RECURRENT: ICD-10-CM

## 2023-12-28 DIAGNOSIS — Z00.00 ROUTINE HEALTH MAINTENANCE: Primary | ICD-10-CM

## 2023-12-28 DIAGNOSIS — M25.511 RIGHT SHOULDER PAIN, UNSPECIFIED CHRONICITY: ICD-10-CM

## 2023-12-28 DIAGNOSIS — E78.49 OTHER HYPERLIPIDEMIA: ICD-10-CM

## 2023-12-28 PROCEDURE — 1101F PR PT FALLS ASSESS DOC 0-1 FALLS W/OUT INJ PAST YR: ICD-10-PCS | Mod: HCNC,CPTII,S$GLB, | Performed by: INTERNAL MEDICINE

## 2023-12-28 PROCEDURE — 99999 PR PBB SHADOW E&M-EST. PATIENT-LVL V: ICD-10-PCS | Mod: PBBFAC,HCNC,, | Performed by: INTERNAL MEDICINE

## 2023-12-28 PROCEDURE — G0008 ADMIN INFLUENZA VIRUS VAC: HCPCS | Mod: HCNC,S$GLB,, | Performed by: INTERNAL MEDICINE

## 2023-12-28 PROCEDURE — 3072F LOW RISK FOR RETINOPATHY: CPT | Mod: HCNC,CPTII,S$GLB, | Performed by: INTERNAL MEDICINE

## 2023-12-28 PROCEDURE — 3072F PR LOW RISK FOR RETINOPATHY: ICD-10-PCS | Mod: HCNC,CPTII,S$GLB, | Performed by: INTERNAL MEDICINE

## 2023-12-28 PROCEDURE — 3288F FALL RISK ASSESSMENT DOCD: CPT | Mod: HCNC,CPTII,S$GLB, | Performed by: INTERNAL MEDICINE

## 2023-12-28 PROCEDURE — 1160F RVW MEDS BY RX/DR IN RCRD: CPT | Mod: HCNC,CPTII,S$GLB, | Performed by: INTERNAL MEDICINE

## 2023-12-28 PROCEDURE — 1159F PR MEDICATION LIST DOCUMENTED IN MEDICAL RECORD: ICD-10-PCS | Mod: HCNC,CPTII,S$GLB, | Performed by: INTERNAL MEDICINE

## 2023-12-28 PROCEDURE — 1159F MED LIST DOCD IN RCRD: CPT | Mod: HCNC,CPTII,S$GLB, | Performed by: INTERNAL MEDICINE

## 2023-12-28 PROCEDURE — 1126F PR PAIN SEVERITY QUANTIFIED, NO PAIN PRESENT: ICD-10-PCS | Mod: HCNC,CPTII,S$GLB, | Performed by: INTERNAL MEDICINE

## 2023-12-28 PROCEDURE — 3288F PR FALLS RISK ASSESSMENT DOCUMENTED: ICD-10-PCS | Mod: HCNC,CPTII,S$GLB, | Performed by: INTERNAL MEDICINE

## 2023-12-28 PROCEDURE — 1101F PT FALLS ASSESS-DOCD LE1/YR: CPT | Mod: HCNC,CPTII,S$GLB, | Performed by: INTERNAL MEDICINE

## 2023-12-28 PROCEDURE — 99397 PR PREVENTIVE VISIT,EST,65 & OVER: ICD-10-PCS | Mod: HCNC,S$GLB,, | Performed by: INTERNAL MEDICINE

## 2023-12-28 PROCEDURE — 1160F PR REVIEW ALL MEDS BY PRESCRIBER/CLIN PHARMACIST DOCUMENTED: ICD-10-PCS | Mod: HCNC,CPTII,S$GLB, | Performed by: INTERNAL MEDICINE

## 2023-12-28 PROCEDURE — G0008 FLU VACCINE - QUADRIVALENT - ADJUVANTED: ICD-10-PCS | Mod: HCNC,S$GLB,, | Performed by: INTERNAL MEDICINE

## 2023-12-28 PROCEDURE — 90694 VACC AIIV4 NO PRSRV 0.5ML IM: CPT | Mod: HCNC,S$GLB,, | Performed by: INTERNAL MEDICINE

## 2023-12-28 PROCEDURE — 1126F AMNT PAIN NOTED NONE PRSNT: CPT | Mod: HCNC,CPTII,S$GLB, | Performed by: INTERNAL MEDICINE

## 2023-12-28 PROCEDURE — 90694 FLU VACCINE - QUADRIVALENT - ADJUVANTED: ICD-10-PCS | Mod: HCNC,S$GLB,, | Performed by: INTERNAL MEDICINE

## 2023-12-28 PROCEDURE — 99397 PER PM REEVAL EST PAT 65+ YR: CPT | Mod: HCNC,S$GLB,, | Performed by: INTERNAL MEDICINE

## 2023-12-28 PROCEDURE — 99999 PR PBB SHADOW E&M-EST. PATIENT-LVL V: CPT | Mod: PBBFAC,HCNC,, | Performed by: INTERNAL MEDICINE

## 2023-12-28 RX ORDER — ACETIC ACID 20.65 MG/ML
4 SOLUTION AURICULAR (OTIC) 3 TIMES DAILY PRN
Qty: 6 ML | Refills: 1 | Status: SHIPPED | OUTPATIENT
Start: 2023-12-28 | End: 2024-02-12 | Stop reason: SDUPTHER

## 2023-12-28 RX ORDER — FLUTICASONE PROPIONATE 50 MCG
2 SPRAY, SUSPENSION (ML) NASAL DAILY
Qty: 16 G | Refills: 2 | Status: SHIPPED | OUTPATIENT
Start: 2023-12-28

## 2023-12-28 NOTE — PROGRESS NOTES
Subjective:       Patient ID: Norman Saunders is a 89 y.o. male.    Chief Complaint: Annual Exam      HPI  Norman Saunders is a 89 y.o. male with diabetes mellitus type 2, HLD, depression, trigeminal neuralgia, GERD   who presents today for Annual Exam    Reports has been having a tooth ache that can spread to maxillary area and left frontal area. Mouth has been dry. Has not interfere with eating but sometimes reproduced. Denies fever  or bleeding. Has not seen his dentist for this condition. Appetite has improved and no longer taking periactine (which can cause dry mouth). Biotene helps some.    Has been having back and shoulder pain. Tylenol helps. Fell 4 times in the last 7 months. He tripped most of the time but vertigo bothers him often. He is taking a medication for Vertigo over the counter (meclizine or dramamine?), taking it every day, seems to help.    Last labs with stable disable diabetes. Had Eye exam at [facility].    His mood has been stable. Sometimes sad with the holidays but does not feel need medication change.      Health Maintenance:  Health Maintenance   Topic Date Due    TETANUS VACCINE  Never done    Shingles Vaccine (2 of 2) 09/16/2022    Eye Exam  09/29/2023    Hemoglobin A1c  11/12/2023    Lipid Panel  05/12/2024       Review of Systems   Constitutional: Negative.  Negative for appetite change, fever and unexpected weight change.   HENT: Negative.          Dry mouth, tooth ache   Respiratory: Negative.     Cardiovascular: Negative.    Gastrointestinal: Negative.    Endocrine: Positive for polydipsia.   Genitourinary:  Negative for difficulty urinating.   Musculoskeletal:  Positive for arthralgias (shoulder) and back pain.   Integumentary:  Negative.   Neurological:  Positive for dizziness.        Chronic trigeminal neuralgia   Psychiatric/Behavioral: Negative.  Negative for sleep disturbance.       Past Medical History:   Diagnosis Date    Cataract     COVID-19 01/27/2022    GERD  (gastroesophageal reflux disease)     Trigeminal neuralgia     Type 2 diabetes mellitus with hyperglycemia, without long-term current use of insulin 5/26/2021       Past Surgical History:   Procedure Laterality Date    CATARACT EXTRACTION         Family History   Problem Relation Age of Onset    No Known Problems Mother     No Known Problems Father     Amblyopia Neg Hx     Blindness Neg Hx     Cancer Neg Hx     Cataracts Neg Hx     Diabetes Neg Hx     Glaucoma Neg Hx     Hypertension Neg Hx     Macular degeneration Neg Hx     Retinal detachment Neg Hx     Strabismus Neg Hx     Stroke Neg Hx     Thyroid disease Neg Hx        Social History     Socioeconomic History    Marital status:    Tobacco Use    Smoking status: Never    Smokeless tobacco: Never   Substance and Sexual Activity    Alcohol use: No    Drug use: No       Current Outpatient Medications   Medication Sig Dispense Refill    benzonatate (TESSALON) 200 MG capsule Take 1 capsule (200 mg total) by mouth 3 (three) times daily as needed for Cough. 30 capsule 2    blood sugar diagnostic Strp To check BG 2 times daily, to use with insurance preferred meter 100 strip 11    fluocinolone acetonide oiL (DERMOTIC OIL) 0.01 % Drop Place 3 drops in ear(s) 2 (two) times daily. 20 mL 3    lancets Misc To check BG 2 times daily, to use with insurance preferred meter 100 each 11    meclizine (ANTIVERT) 12.5 mg tablet Take 1 tablet (12.5 mg total) by mouth 3 (three) times daily as needed for Dizziness. 60 tablet 1    metFORMIN (GLUCOPHAGE-XR) 500 MG ER 24hr tablet TOME JAK TABLETA TODOS LOS SWENSON CON EL DESAYUNO 90 tablet 1    omeprazole (PRILOSEC) 20 MG capsule TAKE 1 CAPSULE (20 MG TOTAL) BY MOUTH ONCE DAILY. PARA EL REFLUJO. 90 capsule 3    OXcarbazepine (TRILEPTAL) 300 MG Tab TOME 1 TABLETA DOS VECES AL GRANT 180 tablet 0    tamsulosin (FLOMAX) 0.4 mg Cap TOME JAK CAPSULA TODOS LOS SWENSON 90 capsule 3    acetic acid (VOSOL) 2 % otic solution Place 4 drops into both  "ears 3 (three) times daily as needed (itching). 6 mL 1    aspirin (ECOTRIN) 81 MG EC tablet Take 1 tablet (81 mg total) by mouth once daily. 30 tablet 11    blood-glucose meter kit To check BG 2 times daily, to use with insurance preferred meter 1 each 5    fluticasone propionate (FLONASE) 50 mcg/actuation nasal spray 2 sprays (100 mcg total) by Each Nostril route once daily. 16 g 2     No current facility-administered medications for this visit.       Review of patient's allergies indicates:  No Known Allergies      Objective:       Last 3 sets of Vitals        12/16/2022     1:36 PM 5/12/2023     2:36 PM 12/28/2023     1:55 PM   Vitals - 1 value per visit   SYSTOLIC 139 124 142   DIASTOLIC 80 62 68   Pulse 69 88 89   SPO2  96 % 95 %   Weight (lb) 169 168.65 178.13   Weight (kg) 76.658 76.5 80.8   Height 5' 6" (1.676 m) 5' 6" (1.676 m) 5' 6" (1.676 m)   BMI (Calculated) 27.3 27.2 28.8   Pain Score Seven Zero Zero   Physical Exam  Constitutional:       General: He is not in acute distress.     Appearance: Normal appearance.   HENT:      Head: Normocephalic.      Right Ear: Tympanic membrane, ear canal and external ear normal.      Left Ear: Tympanic membrane, ear canal and external ear normal.      Nose: Nose normal.      Mouth/Throat:      Mouth: Mucous membranes are moist.      Comments: No blister, significant swelling, or bleeding.  Eyes:      General: No scleral icterus.     Extraocular Movements: Extraocular movements intact.      Conjunctiva/sclera: Conjunctivae normal.   Neck:      Vascular: No carotid bruit.      Comments: No goiter.  Cardiovascular:      Rate and Rhythm: Normal rate and regular rhythm.      Pulses: Normal pulses.      Heart sounds: Normal heart sounds.   Pulmonary:      Effort: Pulmonary effort is normal.      Breath sounds: Normal breath sounds.   Abdominal:      General: Bowel sounds are normal. There is no distension.      Palpations: Abdomen is soft. There is no mass.      Tenderness: " There is no abdominal tenderness.   Musculoskeletal:         General: No swelling.   Lymphadenopathy:      Cervical: No cervical adenopathy.   Skin:     General: Skin is warm and dry.   Neurological:      General: No focal deficit present.      Mental Status: He is alert and oriented to person, place, and time.      Comments: Hard of hearing, no loss of balance   Psychiatric:         Mood and Affect: Mood normal.         Behavior: Behavior normal.           CBC:  Recent Labs   Lab 04/21/22  1432 10/18/22  0822 05/12/23  1620   WBC 6.24 7.29 6.66   RBC 4.92 5.05 5.05   Hemoglobin 14.1 14.1 14.0   Hematocrit 42.1 42.6 42.2   Platelets 218 220 220   MCV 86 84 84   MCH 28.7 27.9 27.7   MCHC 33.5 33.1 33.2     CMP:  Recent Labs   Lab 04/21/22  1432 10/18/22  0822 05/12/23  1620   Glucose 125 H 113 H 107   Calcium 9.7 9.3 9.4   Albumin 4.2 3.8 4.2   Total Protein 8.0 7.6 7.9   Sodium 141 139 140   Potassium 4.1 4.1 4.1   CO2 24 22 L 22 L   Chloride 105 106 108   BUN 12 13 21   Creatinine 1.1 1.0 1.1   Alkaline Phosphatase 68 90 79   ALT 15 14 16   AST 17 15 18   Total Bilirubin 0.5 0.4 0.5     URINALYSIS:  Recent Labs   Lab 04/20/21  0835 01/28/22  0035 02/11/22  1221   Color, UA Yellow Yellow Yellow   Specific Gravity, UA >=1.030 A >1.030 A 1.030   pH, UA 6.0 7.0 6.0   Protein, UA 1+ A 2+ A 1+ A   Bacteria Rare None Rare   Nitrite, UA Negative Negative Negative   Leukocytes, UA Negative Negative Negative   Urobilinogen, UA Negative Negative 2.0-3.0 A   Hyaline Casts, UA 0 0 1      LIPIDS:  Recent Labs   Lab 02/11/22  1255 04/21/22  1432 10/18/22  0822 05/12/23  1620   TSH 1.751 1.704  --  1.498   HDL  --  48 45 49   Cholesterol  --  224 H 194 205 H   Triglycerides  --  128 90 119   LDL Cholesterol  --  150.4 131.0 132.2   HDL/Cholesterol Ratio  --  21.4 23.2 23.9   Non-HDL Cholesterol  --  176 149 156   Total Cholesterol/HDL Ratio  --  4.7 4.3 4.2     TSH:  Recent Labs   Lab 02/11/22  1255 04/21/22  1432 05/12/23  1620    TSH 1.751 1.704 1.498       A1C:  Recent Labs   Lab 04/20/21  0855 08/20/21  0742 02/11/22  1255 04/21/22  1432 10/18/22  0822 05/12/23  1620   Hemoglobin A1C 8.3 H 6.6 H 6.9 H 6.6 H 6.2 H 6.2 H       Imaging:  X-Ray Lumbar Spine AP And Lateral  Narrative: EXAMINATION:  XR LUMBAR SPINE AP AND LATERAL    CLINICAL HISTORY:  Low back pain, unspecified    TECHNIQUE:  AP, lateral and spot images were performed of the lumbar spine.    COMPARISON:  Lumbar spine, 06/01/2006    FINDINGS:  Bony structures appear in tact with generalized spondylosis with marginal osteophytes and disc space narrowing throughout.  Alignment is maintained.  Pedicles demonstrate no destruction  Impression: Increasing spondylosis throughout lumbar spine with no evidence of acute fracture.    Electronically signed by: Lázaro Barber  Date:    02/24/2022  Time:    10:09      Assessment:       1. Routine health maintenance    2. Benign paroxysmal vertigo, left ear    3. Multiple falls    4. Tooth ache    5. Type 2 diabetes mellitus with hyperglycemia, without long-term current use of insulin    6. Other hyperlipidemia    7. Back pain without sciatica    8. Right shoulder pain, unspecified chronicity    9. Current moderate episode of major depressive disorder, unspecified whether recurrent    10. Ear itch            Plan:       1. Routine health maintenance  -     Influenza (FLUAD) - Quadrivalent (Adjuvanted) *Preferred* (65+) (PF)  -     Comprehensive Metabolic Panel; Future; Expected date: 12/28/2023  -     CBC Auto Differential; Future; Expected date: 12/28/2023  -     Lipid Panel; Future; Expected date: 12/28/2023  -     TSH; Future; Expected date: 12/28/2023  - Stable exam, monitor vital signs as BP is mildly elevated.    2. Benign paroxysmal vertigo, left ear  -     CBC Auto Differential; Future; Expected date: 12/28/2023  -     fluticasone propionate (FLONASE) 50 mcg/actuation nasal spray; 2 sprays (100 mcg total) by Each Nostril route once  daily.  Dispense: 16 g; Refill: 2  - Meclizine helps but may be causing dry mouth. Use as needed. PT ordered.    3. Multiple falls  -     Ambulatory referral/consult to Physical/Occupational Therapy; Future; Expected date: 12/28/2023  - No focal signs. It will help balance, strengthening, and pain.    4. Tooth ache  -     CBC Auto Differential; Future; Expected date: 12/28/2023  - Dental evaluation recommended.    5. Type 2 diabetes mellitus with hyperglycemia, without long-term current use of insulin  -     Hemoglobin A1C; Future; Expected date: 12/28/2023  -     Comprehensive Metabolic Panel; Future; Expected date: 12/28/2023  -     Lipid Panel; Future; Expected date: 12/28/2023  -     TSH; Future; Expected date: 12/28/2023  - Stable, controlling with diet and metformin.    6. Other hyperlipidemia  -     Lipid Panel; Future; Expected date: 12/28/2023  -     TSH; Future; Expected date: 12/28/2023    7. Back pain without sciatica  -     Ambulatory referral/consult to Physical/Occupational Therapy; Future; Expected date: 12/28/2023    8. Right shoulder pain, unspecified chronicity  -     Ambulatory referral/consult to Physical/Occupational Therapy; Future; Expected date: 12/28/2023    9. Current moderate episode of major depressive disorder, unspecified whether recurrent   - Stable, off mirtazapine.    10. Ear itch  -     acetic acid (VOSOL) 2 % otic solution; Place 4 drops into both ears 3 (three) times daily as needed (itching).  Dispense: 6 mL; Refill: 1       Health Maintenance Due   Topic Date Due    TETANUS VACCINE  Never done    RSV Vaccine (Age 60+ and Pregnant patients) (1 - 1-dose 60+ series) Never done    Shingles Vaccine (2 of 2) 09/16/2022    Diabetes Urine Screening  02/11/2023    COVID-19 Vaccine (4 - 2023-24 season) 09/01/2023    Eye Exam  09/29/2023    Hemoglobin A1c  11/12/2023            Return to clinic in 3 months.    Darshana Brandon MD  [facility]  Disclaimer:  This note has been generated  using voice-recognition software. There may be grammatical or spelling errors that have been missed during proof-reading

## 2024-02-12 DIAGNOSIS — L29.9 EAR ITCH: ICD-10-CM

## 2024-02-12 DIAGNOSIS — K21.00 GASTROESOPHAGEAL REFLUX DISEASE WITH ESOPHAGITIS WITHOUT HEMORRHAGE: ICD-10-CM

## 2024-02-12 RX ORDER — OMEPRAZOLE 20 MG/1
20 CAPSULE, DELAYED RELEASE ORAL DAILY
Qty: 90 CAPSULE | Refills: 3 | Status: SHIPPED | OUTPATIENT
Start: 2024-02-12

## 2024-02-12 RX ORDER — ACETIC ACID 20.65 MG/ML
4 SOLUTION AURICULAR (OTIC) 3 TIMES DAILY PRN
Qty: 6 ML | Refills: 1 | Status: SHIPPED | OUTPATIENT
Start: 2024-02-12

## 2024-02-12 NOTE — TELEPHONE ENCOUNTER
----- Message from Vipin Tipton MA sent at 2/12/2024 10:06 AM CST -----    ----- Message -----  From: Ayush Gonzalez  Sent: 2/12/2024   9:59 AM CST  To: [facility] Staff    Type:  Pharmacy Calling to Clarify an RX      [facility]   Prescription Name:omeprazole (PRILOSEC) 20 MG capsule  acetic acid (VOSOL) 2 % otic solution  What do they need to clarify?:refill  Best Call Back Number: [phone]  Additional Information:     Fax was sent to office on 02/06 regarding medications listed above

## 2024-03-22 ENCOUNTER — LAB VISIT (OUTPATIENT)
Dept: LAB | Facility: HOSPITAL | Age: 89
End: 2024-03-22
Attending: INTERNAL MEDICINE
Payer: MEDICARE

## 2024-03-22 DIAGNOSIS — K08.89 TOOTH ACHE: ICD-10-CM

## 2024-03-22 DIAGNOSIS — E78.49 OTHER HYPERLIPIDEMIA: ICD-10-CM

## 2024-03-22 DIAGNOSIS — H81.12 BENIGN PAROXYSMAL VERTIGO, LEFT EAR: ICD-10-CM

## 2024-03-22 DIAGNOSIS — E11.65 TYPE 2 DIABETES MELLITUS WITH HYPERGLYCEMIA, WITHOUT LONG-TERM CURRENT USE OF INSULIN: ICD-10-CM

## 2024-03-22 DIAGNOSIS — Z00.00 ROUTINE HEALTH MAINTENANCE: ICD-10-CM

## 2024-03-22 LAB
ALBUMIN SERPL BCP-MCNC: 3.8 G/DL (ref 3.5–5.2)
ALP SERPL-CCNC: 105 U/L (ref 55–135)
ALT SERPL W/O P-5'-P-CCNC: 12 U/L (ref 10–44)
ANION GAP SERPL CALC-SCNC: 12 MMOL/L (ref 8–16)
AST SERPL-CCNC: 13 U/L (ref 10–40)
BASOPHILS # BLD AUTO: 0.07 K/UL (ref 0–0.2)
BASOPHILS NFR BLD: 0.9 % (ref 0–1.9)
BILIRUB SERPL-MCNC: 0.3 MG/DL (ref 0.1–1)
BUN SERPL-MCNC: 12 MG/DL (ref 10–30)
CALCIUM SERPL-MCNC: 9.4 MG/DL (ref 8.7–10.5)
CHLORIDE SERPL-SCNC: 98 MMOL/L (ref 95–110)
CHOLEST SERPL-MCNC: 208 MG/DL (ref 120–199)
CHOLEST/HDLC SERPL: 4.1 {RATIO} (ref 2–5)
CO2 SERPL-SCNC: 24 MMOL/L (ref 23–29)
CREAT SERPL-MCNC: 1 MG/DL (ref 0.5–1.4)
DIFFERENTIAL METHOD BLD: ABNORMAL
EOSINOPHIL # BLD AUTO: 0.3 K/UL (ref 0–0.5)
EOSINOPHIL NFR BLD: 4.3 % (ref 0–8)
ERYTHROCYTE [DISTWIDTH] IN BLOOD BY AUTOMATED COUNT: 13.6 % (ref 11.5–14.5)
EST. GFR  (NO RACE VARIABLE): >60 ML/MIN/1.73 M^2
ESTIMATED AVG GLUCOSE: 143 MG/DL (ref 68–131)
GLUCOSE SERPL-MCNC: 140 MG/DL (ref 70–110)
HBA1C MFR BLD: 6.6 % (ref 4–5.6)
HCT VFR BLD AUTO: 41.3 % (ref 40–54)
HDLC SERPL-MCNC: 51 MG/DL (ref 40–75)
HDLC SERPL: 24.5 % (ref 20–50)
HGB BLD-MCNC: 13.8 G/DL (ref 14–18)
IMM GRANULOCYTES # BLD AUTO: 0.03 K/UL (ref 0–0.04)
IMM GRANULOCYTES NFR BLD AUTO: 0.4 % (ref 0–0.5)
LDLC SERPL CALC-MCNC: 138.8 MG/DL (ref 63–159)
LYMPHOCYTES # BLD AUTO: 2.6 K/UL (ref 1–4.8)
LYMPHOCYTES NFR BLD: 32.9 % (ref 18–48)
MCH RBC QN AUTO: 27.3 PG (ref 27–31)
MCHC RBC AUTO-ENTMCNC: 33.4 G/DL (ref 32–36)
MCV RBC AUTO: 82 FL (ref 82–98)
MONOCYTES # BLD AUTO: 0.6 K/UL (ref 0.3–1)
MONOCYTES NFR BLD: 7.7 % (ref 4–15)
NEUTROPHILS # BLD AUTO: 4.3 K/UL (ref 1.8–7.7)
NEUTROPHILS NFR BLD: 53.8 % (ref 38–73)
NONHDLC SERPL-MCNC: 157 MG/DL
NRBC BLD-RTO: 0 /100 WBC
PLATELET # BLD AUTO: 254 K/UL (ref 150–450)
PMV BLD AUTO: 9.9 FL (ref 9.2–12.9)
POTASSIUM SERPL-SCNC: 4.2 MMOL/L (ref 3.5–5.1)
PROT SERPL-MCNC: 7.6 G/DL (ref 6–8.4)
RBC # BLD AUTO: 5.06 M/UL (ref 4.6–6.2)
SODIUM SERPL-SCNC: 134 MMOL/L (ref 136–145)
TRIGL SERPL-MCNC: 91 MG/DL (ref 30–150)
TSH SERPL DL<=0.005 MIU/L-ACNC: 3.73 UIU/ML (ref 0.4–4)
WBC # BLD AUTO: 7.94 K/UL (ref 3.9–12.7)

## 2024-03-22 PROCEDURE — 85025 COMPLETE CBC W/AUTO DIFF WBC: CPT | Mod: HCNC | Performed by: INTERNAL MEDICINE

## 2024-03-22 PROCEDURE — 36415 COLL VENOUS BLD VENIPUNCTURE: CPT | Mod: HCNC | Performed by: INTERNAL MEDICINE

## 2024-03-22 PROCEDURE — 83036 HEMOGLOBIN GLYCOSYLATED A1C: CPT | Mod: HCNC | Performed by: INTERNAL MEDICINE

## 2024-03-22 PROCEDURE — 84443 ASSAY THYROID STIM HORMONE: CPT | Mod: HCNC | Performed by: INTERNAL MEDICINE

## 2024-03-22 PROCEDURE — 80061 LIPID PANEL: CPT | Mod: HCNC | Performed by: INTERNAL MEDICINE

## 2024-03-22 PROCEDURE — 80053 COMPREHEN METABOLIC PANEL: CPT | Mod: HCNC | Performed by: INTERNAL MEDICINE

## 2024-03-23 ENCOUNTER — PATIENT MESSAGE (OUTPATIENT)
Dept: PRIMARY CARE CLINIC | Facility: CLINIC | Age: 89
End: 2024-03-23
Payer: MEDICARE

## 2024-03-28 ENCOUNTER — TELEPHONE (OUTPATIENT)
Dept: PAIN MEDICINE | Facility: CLINIC | Age: 89
End: 2024-03-28
Payer: MEDICARE

## 2024-04-05 NOTE — TELEPHONE ENCOUNTER
No care due was identified.  Health [facility] Embedded Care Due Messages. Reference number: [id number].   4/05/2024 5:07:00 PM CDT

## 2024-04-07 DIAGNOSIS — E11.00 TYPE 2 DIABETES MELLITUS WITH HYPEROSMOLARITY WITHOUT COMA, WITHOUT LONG-TERM CURRENT USE OF INSULIN: ICD-10-CM

## 2024-04-07 RX ORDER — METFORMIN HYDROCHLORIDE 500 MG/1
TABLET, EXTENDED RELEASE ORAL
Qty: 90 TABLET | Refills: 0 | Status: SHIPPED | OUTPATIENT
Start: 2024-04-07

## 2024-04-07 RX ORDER — MECLIZINE HCL 12.5 MG 12.5 MG/1
12.5 TABLET ORAL 3 TIMES DAILY PRN
Qty: 60 TABLET | Refills: 1 | Status: SHIPPED | OUTPATIENT
Start: 2024-04-07 | End: 2024-04-10 | Stop reason: SDUPTHER

## 2024-04-07 NOTE — TELEPHONE ENCOUNTER
No care due was identified.  Health [facility] Embedded Care Due Messages. Reference number: [id number].   4/07/2024 8:04:30 AM CDT

## 2024-04-08 NOTE — TELEPHONE ENCOUNTER
Refill Decision Note   Norman Saunders  is requesting a refill authorization.  Brief Assessment and Rationale for Refill:  Approve     Medication Therapy Plan:        Comments:     Note composed:8:29 PM 04/07/2024

## 2024-04-10 ENCOUNTER — OFFICE VISIT (OUTPATIENT)
Dept: PRIMARY CARE CLINIC | Facility: CLINIC | Age: 89
End: 2024-04-10
Payer: MEDICARE

## 2024-04-10 VITALS
DIASTOLIC BLOOD PRESSURE: 72 MMHG | OXYGEN SATURATION: 97 % | BODY MASS INDEX: 28.51 KG/M2 | HEIGHT: 66 IN | WEIGHT: 177.38 LBS | SYSTOLIC BLOOD PRESSURE: 134 MMHG

## 2024-04-10 DIAGNOSIS — E11.65 TYPE 2 DIABETES MELLITUS WITH HYPERGLYCEMIA, WITHOUT LONG-TERM CURRENT USE OF INSULIN: ICD-10-CM

## 2024-04-10 DIAGNOSIS — E78.49 OTHER HYPERLIPIDEMIA: ICD-10-CM

## 2024-04-10 DIAGNOSIS — G50.0 LEFT-SIDED TRIGEMINAL NEURALGIA: ICD-10-CM

## 2024-04-10 DIAGNOSIS — Z00.00 ANNUAL PHYSICAL EXAM: Primary | ICD-10-CM

## 2024-04-10 DIAGNOSIS — Z00.00 HEALTHCARE MAINTENANCE: ICD-10-CM

## 2024-04-10 DIAGNOSIS — R42 VERTIGO: ICD-10-CM

## 2024-04-10 PROBLEM — F32.1 CURRENT MODERATE EPISODE OF MAJOR DEPRESSIVE DISORDER, UNSPECIFIED WHETHER RECURRENT: Status: RESOLVED | Noted: 2023-12-28 | Resolved: 2024-04-10

## 2024-04-10 PROCEDURE — 99999 PR PBB SHADOW E&M-EST. PATIENT-LVL V: CPT | Mod: PBBFAC,HCNC,, | Performed by: INTERNAL MEDICINE

## 2024-04-10 PROCEDURE — 1126F AMNT PAIN NOTED NONE PRSNT: CPT | Mod: HCNC,CPTII,S$GLB, | Performed by: INTERNAL MEDICINE

## 2024-04-10 PROCEDURE — 1101F PT FALLS ASSESS-DOCD LE1/YR: CPT | Mod: HCNC,CPTII,S$GLB, | Performed by: INTERNAL MEDICINE

## 2024-04-10 PROCEDURE — 1160F RVW MEDS BY RX/DR IN RCRD: CPT | Mod: HCNC,CPTII,S$GLB, | Performed by: INTERNAL MEDICINE

## 2024-04-10 PROCEDURE — 3288F FALL RISK ASSESSMENT DOCD: CPT | Mod: HCNC,CPTII,S$GLB, | Performed by: INTERNAL MEDICINE

## 2024-04-10 PROCEDURE — 99215 OFFICE O/P EST HI 40 MIN: CPT | Mod: HCNC,S$GLB,, | Performed by: INTERNAL MEDICINE

## 2024-04-10 PROCEDURE — 1159F MED LIST DOCD IN RCRD: CPT | Mod: HCNC,CPTII,S$GLB, | Performed by: INTERNAL MEDICINE

## 2024-04-10 RX ORDER — OXCARBAZEPINE 300 MG/1
TABLET, FILM COATED ORAL
Qty: 180 TABLET | Refills: 2 | Status: SHIPPED | OUTPATIENT
Start: 2024-04-10

## 2024-04-10 RX ORDER — ATORVASTATIN CALCIUM 20 MG/1
20 TABLET, FILM COATED ORAL DAILY
Qty: 90 TABLET | Refills: 3 | Status: SHIPPED | OUTPATIENT
Start: 2024-04-10 | End: 2025-04-10

## 2024-04-10 RX ORDER — MECLIZINE HCL 12.5 MG 12.5 MG/1
12.5 TABLET ORAL 3 TIMES DAILY PRN
Qty: 60 TABLET | Refills: 1 | Status: SHIPPED | OUTPATIENT
Start: 2024-04-10

## 2024-04-10 NOTE — PROGRESS NOTES
"Subjective:       Patient ID: Norman Saunders is a 89 y.o. male.    Chief Complaint: Follow-up      HPI  Norman Saunders is a 89 y.o. male with  diabetes mellitus type 2, HLD, depression, trigeminal neuralgia, GERD who presents today for Follow-up    Reports has been having episodes of dizziness.  Sometimes with changes in position and other times just by turning he has head recently saw an ENT and had was removed from ears and a topical treatment.  Starting to feel again some discomfort of his right ear.  Has chronic "children voice" like sound in his ear.  Meclizine helps his symptoms and takes it twice a day. He uses Flonase off and on which helps his sinus symptoms.  There is no nausea, palpitations, shortness of breath.    Planes of left-sided frontal and left side of the face pain associated to his trigeminal neuralgia.  Ran out of his medications, Trileptal, that used to help.  Has not noted vision changes or memory changes.    His diabetes has been well controlled.  Keeps his diet healthy but sometimes will like to have tortillas or other carbs but not every day.  His recent labs show mild increasing A1c and fasting glucose.  Will like to try changes in diet before seeing the nutritionist.  Family needs to encourage him to exercise.    Blood pressure has been well controlled.  Has occasional right-sided chest pain that did not seem to be cardiac.    Labs with stable lipid profile, not at goal.  We have discussed lipid profile in the past regarding diabetes.  Benefit is questionable 80 but he is doing overall well and I would favor prevention.    No toxic habits. No falls. Lives with son.    ACP- no documents in chart.    Health Maintenance:  Health Maintenance   Topic Date Due    TETANUS VACCINE  Never done    Shingles Vaccine (2 of 2) 09/16/2022    Eye Exam  09/29/2023    Hemoglobin A1c  09/22/2024    Lipid Panel  03/22/2025       Review of Systems   Constitutional: Negative.  Negative for fever and " unexpected weight change.   HENT:  Positive for ear pain.    Respiratory: Negative.     Cardiovascular:  Positive for chest pain (right sided, at random).   Gastrointestinal: Negative.    Genitourinary:  Negative for difficulty urinating.   Musculoskeletal: Negative.    Integumentary:  Negative.   Neurological:  Positive for dizziness and numbness (occasional feet numbness).        Pain in trigeminal area   Psychiatric/Behavioral: Negative.  Negative for sleep disturbance.       Past Medical History:   Diagnosis Date    Cataract     COVID-19 01/27/2022    GERD (gastroesophageal reflux disease)     Trigeminal neuralgia     Type 2 diabetes mellitus with hyperglycemia, without long-term current use of insulin 5/26/2021       Past Surgical History:   Procedure Laterality Date    CATARACT EXTRACTION         Family History   Problem Relation Age of Onset    No Known Problems Mother     No Known Problems Father     Amblyopia Neg Hx     Blindness Neg Hx     Cancer Neg Hx     Cataracts Neg Hx     Diabetes Neg Hx     Glaucoma Neg Hx     Hypertension Neg Hx     Macular degeneration Neg Hx     Retinal detachment Neg Hx     Strabismus Neg Hx     Stroke Neg Hx     Thyroid disease Neg Hx        Social History     Socioeconomic History    Marital status:    Tobacco Use    Smoking status: Never    Smokeless tobacco: Never   Substance and Sexual Activity    Alcohol use: No    Drug use: No       Current Outpatient Medications   Medication Sig Dispense Refill    acetic acid (VOSOL) 2 % otic solution Place 4 drops into both ears 3 (three) times daily as needed (itching). 6 mL 1    blood sugar diagnostic Strp To check BG 2 times daily, to use with insurance preferred meter 100 strip 11    fluocinolone acetonide oiL (DERMOTIC OIL) 0.01 % Drop Place 3 drops in ear(s) 2 (two) times daily. 20 mL 3    fluticasone propionate (FLONASE) 50 mcg/actuation nasal spray 2 sprays (100 mcg total) by Each Nostril route once daily. 16 g 2     "lancets Misc To check BG 2 times daily, to use with insurance preferred meter 100 each 11    metFORMIN (GLUCOPHAGE-XR) 500 MG ER 24hr tablet TOME JAK TABLETA TODOS LOS SWENSON CON EL DESAYUNO 90 tablet 0    omeprazole (PRILOSEC) 20 MG capsule Take 1 capsule (20 mg total) by mouth once daily. Para el reflujo. 90 capsule 3    tamsulosin (FLOMAX) 0.4 mg Cap TOME JAK CAPSULA TODOS LOS SWENSON 90 capsule 3    aspirin (ECOTRIN) 81 MG EC tablet Take 1 tablet (81 mg total) by mouth once daily. 30 tablet 11    atorvastatin (LIPITOR) 20 MG tablet Take 1 tablet (20 mg total) by mouth once daily. 90 tablet 3    blood-glucose meter kit To check BG 2 times daily, to use with insurance preferred meter 1 each 5    meclizine (ANTIVERT) 12.5 mg tablet Take 1 tablet (12.5 mg total) by mouth 3 (three) times daily as needed for Dizziness. 60 tablet 1    OXcarbazepine (TRILEPTAL) 300 MG Tab TOME 1 TABLETA DOS VECES AL GRANT 180 tablet 2     No current facility-administered medications for this visit.       Review of patient's allergies indicates:  No Known Allergies      Objective:       Last 3 sets of Vitals        5/12/2023     2:36 PM 12/28/2023     1:55 PM 4/10/2024     3:36 PM   Vitals - 1 value per visit   SYSTOLIC 124 142 134   DIASTOLIC 62 68 72   Pulse 88 89    SPO2 96 % 95 % 97 %   Weight (lb) 168.65 178.13 177.36   Weight (kg) 76.5 80.8 80.45   Height 5' 6" (1.676 m) 5' 6" (1.676 m) 5' 6" (1.676 m)   BMI (Calculated) 27.2 28.8 28.6   Pain Score Zero Zero Zero   Physical Exam  Constitutional:       General: He is not in acute distress.  HENT:      Head: Normocephalic.      Comments: Wax present but not completely blocked, no erythema or exudate.      Right Ear: Tympanic membrane, ear canal and external ear normal.      Left Ear: Tympanic membrane, ear canal and external ear normal.      Nose: Nose normal.      Mouth/Throat:      Mouth: Mucous membranes are moist.      Pharynx: No posterior oropharyngeal erythema.   Eyes:      General: " No scleral icterus.     Extraocular Movements: Extraocular movements intact.      Conjunctiva/sclera: Conjunctivae normal.   Neck:      Vascular: No carotid bruit.      Comments: No goiter.  Cardiovascular:      Rate and Rhythm: Normal rate and regular rhythm.      Pulses: Normal pulses.      Heart sounds: Normal heart sounds.   Pulmonary:      Effort: Pulmonary effort is normal.      Breath sounds: Normal breath sounds.   Abdominal:      General: Bowel sounds are normal. There is no distension.      Palpations: Abdomen is soft. There is no mass.      Tenderness: There is no abdominal tenderness.   Musculoskeletal:         General: No swelling.   Lymphadenopathy:      Cervical: No cervical adenopathy.   Skin:     General: Skin is warm and dry.   Neurological:      General: No focal deficit present.      Mental Status: He is alert and oriented to person, place, and time.   Psychiatric:         Mood and Affect: Mood normal.         Behavior: Behavior normal.       Protective Sensation (w/ 10 gram monofilament):  Right: Intact  Left: Intact    Visual Inspection:  Normal -  Bilateral    Pedal Pulses:   Right: Present  Left: Present    Posterior Tibialis Pulses:   Right:Present  Left: Present     CBC:  Recent Labs   Lab 10/18/22  0822 05/12/23  1620 03/22/24  0824   WBC 7.29 6.66 7.94   RBC 5.05 5.05 5.06   Hemoglobin 14.1 14.0 13.8 L   Hematocrit 42.6 42.2 41.3   Platelets 220 220 254   MCV 84 84 82   MCH 27.9 27.7 27.3   MCHC 33.1 33.2 33.4     CMP:  Recent Labs   Lab 10/18/22  0822 05/12/23  1620 03/22/24  0824   Glucose 113 H 107 140 H   Calcium 9.3 9.4 9.4   Albumin 3.8 4.2 3.8   Total Protein 7.6 7.9 7.6   Sodium 139 140 134 L   Potassium 4.1 4.1 4.2   CO2 22 L 22 L 24   Chloride 106 108 98   BUN 13 21 12   Creatinine 1.0 1.1 1.0   Alkaline Phosphatase 90 79 105   ALT 14 16 12   AST 15 18 13   Total Bilirubin 0.4 0.5 0.3     URINALYSIS:  Recent Labs   Lab 04/20/21  0835 01/28/22  0035 02/11/22  1221   Color, UA  Yellow Yellow Yellow   Specific Gravity, UA >=1.030 A >1.030 A 1.030   pH, UA 6.0 7.0 6.0   Protein, UA 1+ A 2+ A 1+ A   Bacteria Rare None Rare   Nitrite, UA Negative Negative Negative   Leukocytes, UA Negative Negative Negative   Urobilinogen, UA Negative Negative 2.0-3.0 A   Hyaline Casts, UA 0 0 1      LIPIDS:  Recent Labs   Lab 04/21/22  1432 10/18/22  0822 05/12/23  1620 03/22/24  0824   TSH 1.704  --  1.498 3.727   HDL 48 45 49 51   Cholesterol 224 H 194 205 H 208 H   Triglycerides 128 90 119 91   LDL Cholesterol 150.4 131.0 132.2 138.8   HDL/Cholesterol Ratio 21.4 23.2 23.9 24.5   Non-HDL Cholesterol 176 149 156 157   Total Cholesterol/HDL Ratio 4.7 4.3 4.2 4.1     TSH:  Recent Labs   Lab 04/21/22  1432 05/12/23  1620 03/22/24  0824   TSH 1.704 1.498 3.727       A1C:  Recent Labs   Lab 04/20/21  0855 08/20/21  0742 02/11/22  1255 04/21/22  1432 10/18/22  0822 05/12/23  1620 03/22/24  0824   Hemoglobin A1C 8.3 H 6.6 H 6.9 H 6.6 H 6.2 H 6.2 H 6.6 H       Imaging:  X-Ray Lumbar Spine AP And Lateral  Narrative: EXAMINATION:  XR LUMBAR SPINE AP AND LATERAL    CLINICAL HISTORY:  Low back pain, unspecified    TECHNIQUE:  AP, lateral and spot images were performed of the lumbar spine.    COMPARISON:  Lumbar spine, 06/01/2006    FINDINGS:  Bony structures appear in tact with generalized spondylosis with marginal osteophytes and disc space narrowing throughout.  Alignment is maintained.  Pedicles demonstrate no destruction  Impression: Increasing spondylosis throughout lumbar spine with no evidence of acute fracture.    Electronically signed by: Lázaro Barber  Date:    02/24/2022  Time:    10:09      Assessment:       1. Annual physical exam    2. Type 2 diabetes mellitus with hyperglycemia, without long-term current use of insulin    3. Left-sided trigeminal neuralgia    4. Vertigo    5. Other hyperlipidemia    6. Healthcare maintenance            Plan:       1. Annual physical exam    2. Type 2 diabetes mellitus  with hyperglycemia, without long-term current use of insulin  Overview:  On metformin daily    Assessment & Plan:  Will work on diet and exercise. May need to increase Metformin to 2 tablets a day.  Follow labs.    Orders:  -     Ambulatory referral/consult to Optometry; Future; Expected date: 04/10/2024  -     Hemoglobin A1C; Future; Expected date: 04/10/2024  -     Comprehensive Metabolic Panel; Future; Expected date: 04/10/2024  -     Lipid Panel; Future; Expected date: 04/10/2024  -     Microalbumin/Creatinine Ratio, Urine; Future; Expected date: 04/10/2024    3. Left-sided trigeminal neuralgia  Overview:  On Oxcarmazepine 300mg twice a day.      Assessment & Plan:  Reports some pain but ran out of medication. Restart and monitor.    Orders:  -     OXcarbazepine (TRILEPTAL) 300 MG Tab; TOME 1 TABLETA DOS VECES AL GRANT  Dispense: 180 tablet; Refill: 2    4. Vertigo  Overview:  On meclizine twice a day    Assessment & Plan:  Recent ear disimpaction. Reports chronic tinnitus and some hearing loss that could be associated. No neuro deficits on exam.  Meclizine helps. Could try to use as needed and Epley exercise.   Consider vestibular PT.  Has follow up with ENT.    Orders:  -     meclizine (ANTIVERT) 12.5 mg tablet; Take 1 tablet (12.5 mg total) by mouth 3 (three) times daily as needed for Dizziness.  Dispense: 60 tablet; Refill: 1    5. Other hyperlipidemia  Overview:  Will try atorvastatin.    Assessment & Plan:  LDL not at goal. Overall doing ok and I would favor statin. Will start low dose atorvastatin. Side effects discussed.    Orders:  -     atorvastatin (LIPITOR) 20 MG tablet; Take 1 tablet (20 mg total) by mouth once daily.  Dispense: 90 tablet; Refill: 3  -     Lipid Panel; Future; Expected date: 04/10/2024    6. Healthcare maintenance  Assessment & Plan:  - Yearly labs- 3/22/24  - Colon cancer screening- no longer indicated  - ACP- information given  - Eye exam- last on 2022, referral placed.  - Foot  exam- done today.  - Vaccination- due for Tdap, shingles, RSV, COVID booster.         Health Maintenance Due   Topic Date Due    TETANUS VACCINE  Never done    RSV Vaccine (Age 60+ and Pregnant patients) (1 - 1-dose 60+ series) Never done    Shingles Vaccine (2 of 2) 09/16/2022    Diabetes Urine Screening  02/11/2023    COVID-19 Vaccine (4 - 2023-24 season) 09/01/2023    Eye Exam  09/29/2023        I spent a total of 40 minutes on the day of the visit.This includes face to face time and non-face to face time preparing to see the patient (eg, review of tests), obtaining and/or reviewing separately obtained history, documenting clinical information in the electronic or other health record, independently interpreting results and communicating results to the patient/family/caregiver, or care coordinator.       Return to clinic in 3 months.  WS 2    Darshana Brandon MD  [facility]  Disclaimer:  This note has been generated using voice-recognition software. There may be grammatical or spelling errors that have been missed during proof-reading

## 2024-04-10 NOTE — PATIENT INSTRUCTIONS
Cuando tienes diabetes tipo 2, lo que comes puede ayudarte a controlar tu nivel de azúcar en mike, evitar el hambre y sentirte lleno por más tiempo.  La diabetes es cuando los niveles de azúcar en mike o glucosa son más altos de lo normal. Son los alimentos con carbohidratos randy panes, cereales, arroz, pasta, frutas, leche y postres los que pueden causar qi aumento.  [place] plan de alimentación debe centrarse en la cantidad y el tipo de carbohidratos que pone en mackay plato sylvester todo el día.    1. Vegetales crudos, cocidos o asados- Estos agregan color, sabor y textura a krzysztof comida. Elige sabrosas verduras bajas en carbohidratos, randy champiñones/setas, cebollas, berenjenas, tomates, coles de Bruselas y calabazas bajas en carbohidratos, randy calabacín. Pruébalos con salsas randy aderezos bajos en grasa, hummus, guacamole y salsa, o asados con diferentes condimentos randy koenig, yovana de cayena o ajo.    2. Greens/Verdes- Ir más allá de mackay ensalada regular y pruebe col rizada (kale), espinacas, y acelga (chard). Son saludables, deliciosos y bajos en carbohidratos. Hojas de col rizada asadas en el horno con aceite de purdy para patatas fritas rápidas y crujientes. También puedes mezclar verduras con verduras tostadas para añadir textura y un sabor diferente, o servirlas con un poco de proteína, randy el salmón.    3. Bebidas sabrosas y bajas en calorías- El agua normal siempre es buena, joe el agua infundida con frutas y verduras es más interesante. Corta un jamie o pepino y ponlo en tu agua, o haz cubitos de hielo con un poco de sabor.  Si no eres un bebedor de té caliente, prueba el té frío con jamie o un palito de joshua. Estas bebidas no solo son bajas en carbohidratos, sino que también pueden ayudarte a llenarte para que no ansías otros alimentos.    4. Bayas (Berries)- ¿Sabías que 1 taza de cualquiera de estos tiene sólo 15 gramos de carbohidratos? Es un poco más kiser, joe es un regalo saludable lleno de  nutrientes y fibra, y es un poco susy. Para un toque diferente, mezcle las bayas con yogur natural, o colóquelas en cubitos de hielo.    5. Alimentos integrales y de fibra más dion- Pruebe legumbres randy frijoles secos, guisantes y lentejas. Estos alimentos todavía tienen carbohidratos, joe tienen sabores interesantes que ayudan a mantenerte satisfecho.    6. Un poco de grasa- Las buenas opciones de grasa incluyen aceite de purdy, aguacate y pescados grasos -- piensen que el salmón servido en un lecho de alcon, por ejemplo.    7. Yogur marcos de proteínas, queso cottage, huevos y andreea magras.     Y no olvides las golosinas- La mantequilla de maní en un palillo de apio es krzysztof buena mezcla de grasa y proteínas para un aperitivo saludable y satisfactorio. También puedes picar en un palillo de queso con bajo contenido de grasa o en un palito de carne de res, joe vigila la cantidad de sodio que hay en ellos.

## 2024-04-11 NOTE — ASSESSMENT & PLAN NOTE
- Yearly labs- 3/22/24  - Colon cancer screening- no longer indicated  - ACP- information given  - Eye exam- last on 2022, referral placed.  - Foot exam- done today.  - Vaccination- due for Tdap, shingles, RSV, COVID booster.

## 2024-04-11 NOTE — ASSESSMENT & PLAN NOTE
Recent ear disimpaction. Reports chronic tinnitus and some hearing loss that could be associated. No neuro deficits on exam.  Meclizine helps. Could try to use as needed and Epley exercise.   Consider vestibular PT.  Has follow up with ENT.

## 2024-04-11 NOTE — ASSESSMENT & PLAN NOTE
LDL not at goal. Overall doing ok and I would favor statin. Will start low dose atorvastatin. Side effects discussed.

## 2024-04-19 DIAGNOSIS — H81.12 BENIGN PAROXYSMAL VERTIGO, LEFT EAR: ICD-10-CM

## 2024-04-22 RX ORDER — FLUTICASONE PROPIONATE 50 MCG
2 SPRAY, SUSPENSION (ML) NASAL DAILY
Qty: 16 G | Refills: 2 | Status: SHIPPED | OUTPATIENT
Start: 2024-04-22

## 2024-06-10 ENCOUNTER — PATIENT MESSAGE (OUTPATIENT)
Dept: PRIMARY CARE CLINIC | Facility: CLINIC | Age: 89
End: 2024-06-10
Payer: MEDICARE

## 2024-06-14 ENCOUNTER — LAB VISIT (OUTPATIENT)
Dept: LAB | Facility: HOSPITAL | Age: 89
End: 2024-06-14
Attending: INTERNAL MEDICINE
Payer: MEDICARE

## 2024-06-14 DIAGNOSIS — E11.65 TYPE 2 DIABETES MELLITUS WITH HYPERGLYCEMIA, WITHOUT LONG-TERM CURRENT USE OF INSULIN: ICD-10-CM

## 2024-06-14 LAB
ALBUMIN/CREAT UR: 18.2 UG/MG (ref 0–30)
CREAT UR-MCNC: 181 MG/DL (ref 23–375)
MICROALBUMIN UR DL<=1MG/L-MCNC: 33 UG/ML

## 2024-06-14 PROCEDURE — 82043 UR ALBUMIN QUANTITATIVE: CPT | Mod: HCNC | Performed by: INTERNAL MEDICINE

## 2024-06-14 PROCEDURE — 82570 ASSAY OF URINE CREATININE: CPT | Mod: HCNC | Performed by: INTERNAL MEDICINE

## 2024-06-20 ENCOUNTER — OFFICE VISIT (OUTPATIENT)
Dept: PRIMARY CARE CLINIC | Facility: CLINIC | Age: 89
End: 2024-06-20
Payer: MEDICARE

## 2024-06-20 VITALS
OXYGEN SATURATION: 95 % | HEART RATE: 72 BPM | WEIGHT: 178.81 LBS | HEIGHT: 66 IN | BODY MASS INDEX: 28.74 KG/M2 | DIASTOLIC BLOOD PRESSURE: 72 MMHG | SYSTOLIC BLOOD PRESSURE: 128 MMHG

## 2024-06-20 DIAGNOSIS — E11.65 TYPE 2 DIABETES MELLITUS WITH HYPERGLYCEMIA, WITHOUT LONG-TERM CURRENT USE OF INSULIN: Primary | ICD-10-CM

## 2024-06-20 DIAGNOSIS — Z00.00 HEALTHCARE MAINTENANCE: ICD-10-CM

## 2024-06-20 DIAGNOSIS — E78.49 OTHER HYPERLIPIDEMIA: ICD-10-CM

## 2024-06-20 DIAGNOSIS — R42 VERTIGO: ICD-10-CM

## 2024-06-20 PROCEDURE — 99999 PR PBB SHADOW E&M-EST. PATIENT-LVL IV: CPT | Mod: PBBFAC,HCNC,, | Performed by: INTERNAL MEDICINE

## 2024-06-20 NOTE — PROGRESS NOTES
Subjective:       Patient ID: Norman Saunders is a 89 y.o. male.    Chief Complaint: Results      HPI  Norman Saunders is a 89 y.o. male with diabetes mellitus type 2, HLD, depression, trigeminal neuralgia, GERD who presents today for Results    Reports he feels well.  Reports no pain and sometimes has mild dizziness.    Takes meclizine twice a day scheduled but dizziness is only occasional does not complain drowsiness or other side effects.  We will try to decrease to once a day.  He was seen by ENT and recommended as needed eardrops.  Feels much better but sometimes may have some itching.    Reports compliance with medications.  Tries to keep his diet healthy but sometimes he has not sure what he can agrees to see nutritionist.  Last labs with mild improvement the fasting glucose and A1cs table.  Could decrease.      Lipid profile looks better.  On atorvastatin 20 mg daily.  Needs encouragement for exercise.    Has no toxic habits.  Lives with son.    Advance Care Planning     Date: 06/21/2024    Power of   I initiated the process of voluntary advance care planning today and explained the importance of this process to the patient.  I introduced the concept of advance directives to the patient, as well. Then the patient received detailed information about the importance of designating a Health Care Power of  (HCPOA). He was also instructed to communicate with this person about their wishes for future healthcare, should he become sick and lose decision-making capacity. The patient has not previously appointed a HCPOA. After our discussion, the patient has decided to complete a HCPOA and has appointed his son and grandson , health care agent:  Tristan Kevin  & health care agent number:  [phone],  . I encouraged him to communicate with this person about their wishes for future healthcare, should he become sick and lose decision-making capacity.      A total of 6 min was spent on  advance care planning, goals of care discussion, emotional support, formulating and communicating prognosis and exploring burden/benefit of various approaches of treatment. This discussion occurred on a fully voluntary basis with the verbal consent of the patient and/or family.    Advance Care Planning     Date: 06/21/2024  Patient did not wish or was not able to provide an Advance Care Plan.            Health Maintenance:  Health Maintenance   Topic Date Due    TETANUS VACCINE  Never done    Shingles Vaccine (2 of 2) 09/16/2022    Eye Exam  09/29/2023    Hemoglobin A1c  12/14/2024    Lipid Panel  06/14/2025       Review of Systems   Neurological:  Positive for dizziness.   All other systems reviewed and are negative.     Past Medical History:   Diagnosis Date    Cataract     COVID-19 01/27/2022    GERD (gastroesophageal reflux disease)     Trigeminal neuralgia     Type 2 diabetes mellitus with hyperglycemia, without long-term current use of insulin 5/26/2021       Past Surgical History:   Procedure Laterality Date    CATARACT EXTRACTION         Family History   Problem Relation Name Age of Onset    No Known Problems Mother      No Known Problems Father      Amblyopia Neg Hx      Blindness Neg Hx      Cancer Neg Hx      Cataracts Neg Hx      Diabetes Neg Hx      Glaucoma Neg Hx      Hypertension Neg Hx      Macular degeneration Neg Hx      Retinal detachment Neg Hx      Strabismus Neg Hx      Stroke Neg Hx      Thyroid disease Neg Hx         Social History     Socioeconomic History    Marital status:    Tobacco Use    Smoking status: Never    Smokeless tobacco: Never   Substance and Sexual Activity    Alcohol use: No    Drug use: No       Current Outpatient Medications   Medication Sig Dispense Refill    acetic acid (VOSOL) 2 % otic solution Place 4 drops into both ears 3 (three) times daily as needed (itching). 6 mL 1    atorvastatin (LIPITOR) 20 MG tablet Take 1 tablet (20 mg total) by mouth once daily. 90  "tablet 3    blood sugar diagnostic Strp To check BG 2 times daily, to use with insurance preferred meter 100 strip 11    fluocinolone acetonide oiL (DERMOTIC OIL) 0.01 % Drop Place 3 drops in ear(s) 2 (two) times daily. 20 mL 3    fluticasone propionate (FLONASE) 50 mcg/actuation nasal spray 2 sprays (100 mcg total) by Each Nostril route once daily. 16 g 2    lancets Misc To check BG 2 times daily, to use with insurance preferred meter 100 each 11    meclizine (ANTIVERT) 12.5 mg tablet Take 1 tablet (12.5 mg total) by mouth 3 (three) times daily as needed for Dizziness. 60 tablet 1    metFORMIN (GLUCOPHAGE-XR) 500 MG ER 24hr tablet TOME JAK TABLETA TODOS LOS SWENSON CON EL DESAYUNO 90 tablet 0    omeprazole (PRILOSEC) 20 MG capsule Take 1 capsule (20 mg total) by mouth once daily. Para el reflujo. 90 capsule 3    OXcarbazepine (TRILEPTAL) 300 MG Tab TOME 1 TABLETA DOS VECES AL GRANT 180 tablet 2    tamsulosin (FLOMAX) 0.4 mg Cap TOME JAK CAPSULA TODOS LOS SWENSON 90 capsule 3    aspirin (ECOTRIN) 81 MG EC tablet Take 1 tablet (81 mg total) by mouth once daily. 30 tablet 11    blood-glucose meter kit To check BG 2 times daily, to use with insurance preferred meter 1 each 5     No current facility-administered medications for this visit.       Review of patient's allergies indicates:  No Known Allergies      Objective:       Last 3 sets of Vitals        12/28/2023     1:55 PM 4/10/2024     3:36 PM 6/20/2024     2:02 PM   Vitals - 1 value per visit   SYSTOLIC 142 134 128   DIASTOLIC 68 72 72   Pulse 89  72   SPO2 95 % 97 % 95 %   Weight (lb) 178.13 177.36 178.79   Weight (kg) 80.8 80.45 81.1   Height 5' 6" (1.676 m) 5' 6" (1.676 m) 5' 6" (1.676 m)   BMI (Calculated) 28.8 28.6 28.9   Pain Score Zero Zero Zero   Physical Exam  Constitutional:       General: He is not in acute distress.     Appearance: Normal appearance.   HENT:      Head: Normocephalic.      Right Ear: Tympanic membrane, ear canal and external ear normal.      " Left Ear: Tympanic membrane, ear canal and external ear normal.      Nose: Nose normal.      Mouth/Throat:      Mouth: Mucous membranes are moist.   Eyes:      General: No scleral icterus.     Extraocular Movements: Extraocular movements intact.      Conjunctiva/sclera: Conjunctivae normal.   Neck:      Vascular: No carotid bruit.      Comments: No goiter.  Cardiovascular:      Rate and Rhythm: Normal rate and regular rhythm.      Pulses: Normal pulses.      Heart sounds: Normal heart sounds.   Pulmonary:      Effort: Pulmonary effort is normal.      Breath sounds: Normal breath sounds.   Abdominal:      General: Bowel sounds are normal. There is no distension.      Palpations: Abdomen is soft. There is no mass.      Tenderness: There is no abdominal tenderness.   Musculoskeletal:         General: No swelling. Normal range of motion.   Lymphadenopathy:      Cervical: No cervical adenopathy.   Skin:     General: Skin is warm and dry.   Neurological:      General: No focal deficit present.      Mental Status: He is alert and oriented to person, place, and time.   Psychiatric:         Mood and Affect: Mood normal.         Behavior: Behavior normal.           CBC:  Recent Labs   Lab 10/18/22  0822 05/12/23  1620 03/22/24  0824   WBC 7.29 6.66 7.94   RBC 5.05 5.05 5.06   Hemoglobin 14.1 14.0 13.8 L   Hematocrit 42.6 42.2 41.3   Platelets 220 220 254   MCV 84 84 82   MCH 27.9 27.7 27.3   MCHC 33.1 33.2 33.4     CMP:  Recent Labs   Lab 05/12/23  1620 03/22/24  0824 06/14/24  0815   Glucose 107 140 H 133 H   Calcium 9.4 9.4 9.3   Albumin 4.2 3.8 3.7   Total Protein 7.9 7.6 7.4   Sodium 140 134 L 144   Potassium 4.1 4.2 3.9   CO2 22 L 24 23   Chloride 108 98 110   BUN 21 12 21   Creatinine 1.1 1.0 1.1   Alkaline Phosphatase 79 105 99   ALT 16 12 10   AST 18 13 12   Total Bilirubin 0.5 0.3 0.3     URINALYSIS:  Recent Labs   Lab 01/28/22  0035 02/11/22  1221   Color, UA Yellow Yellow   Specific Gravity, UA >1.030 A 1.030   pH,  UA 7.0 6.0   Protein, UA 2+ A 1+ A   Bacteria None Rare   Nitrite, UA Negative Negative   Leukocytes, UA Negative Negative   Urobilinogen, UA Negative 2.0-3.0 A   Hyaline Casts, UA 0 1      LIPIDS:  Recent Labs   Lab 04/21/22  1432 10/18/22  0822 05/12/23  1620 03/22/24  0824 06/14/24  0815   TSH 1.704  --  1.498 3.727  --    HDL 48   < > 49 51 47   Cholesterol 224 H   < > 205 H 208 H 130   Triglycerides 128   < > 119 91 67   LDL Cholesterol 150.4   < > 132.2 138.8 69.6   HDL/Cholesterol Ratio 21.4   < > 23.9 24.5 36.2   Non-HDL Cholesterol 176   < > 156 157 83   Total Cholesterol/HDL Ratio 4.7   < > 4.2 4.1 2.8    < > = values in this interval not displayed.     TSH:  Recent Labs   Lab 04/21/22  1432 05/12/23  1620 03/22/24  0824   TSH 1.704 1.498 3.727       A1C:  Recent Labs   Lab 08/20/21  0742 02/11/22  1255 04/21/22  1432 10/18/22  0822 05/12/23  1620 03/22/24  0824 06/14/24  0815   Hemoglobin A1C 6.6 H 6.9 H 6.6 H 6.2 H 6.2 H 6.6 H 6.6 H       Imaging:  X-Ray Lumbar Spine AP And Lateral  Narrative: EXAMINATION:  XR LUMBAR SPINE AP AND LATERAL    CLINICAL HISTORY:  Low back pain, unspecified    TECHNIQUE:  AP, lateral and spot images were performed of the lumbar spine.    COMPARISON:  Lumbar spine, 06/01/2006    FINDINGS:  Bony structures appear in tact with generalized spondylosis with marginal osteophytes and disc space narrowing throughout.  Alignment is maintained.  Pedicles demonstrate no destruction  Impression: Increasing spondylosis throughout lumbar spine with no evidence of acute fracture.    Electronically signed by: Lázaro Barber  Date:    02/24/2022  Time:    10:09      Assessment:       1. Type 2 diabetes mellitus with hyperglycemia, without long-term current use of insulin    2. Other hyperlipidemia    3. Vertigo    4. Healthcare maintenance            Plan:       1. Type 2 diabetes mellitus with hyperglycemia, without long-term current use of insulin  Overview:  On metformin  daily    Assessment & Plan:  A1cs stable slight improvement of the fasting glucose.  Will try to optimize diet and exercise.  Encourage planning and nutritionist evaluation.    Orders:  -     Ambulatory referral/consult to Optometry; Future; Expected date: 06/20/2024  -     Ambulatory referral/consult to Nutrition Services; Future; Expected date: 06/20/2024  -     Hemoglobin A1C; Future; Expected date: 06/20/2024  -     Comprehensive Metabolic Panel; Future; Expected date: 06/20/2024    2. Other hyperlipidemia  Overview:  Tolerating atorvastatin atorvastatin 20 mg daily    Assessment & Plan:  Last LDL less than 70 less than 70 mg/dL, at goal.      3. Vertigo  Overview:  On meclizine twice a day, will try to decrease it to once a day.  With the afraid to use it as needed.    Assessment & Plan:  Reports occasional dizziness.  Blood pressure stable.  Try to decrease meclizine and stay hydrated.  If dizziness increase been continue as currently.      4. Healthcare maintenance  Assessment & Plan:  - Yearly labs- 3/22/24  - Colon cancer screening- no longer indicated  - ACP-completed HPOA 6/20/24.  - Eye exam- last on 2022, referral placed.  - Foot exam- done on 04/10/24.  - Vaccination- due for Tdap, shingles, RSV, COVID booster.         Health Maintenance Due   Topic Date Due    TETANUS VACCINE  Never done    RSV Vaccine (Age 60+ and Pregnant patients) (1 - 1-dose 60+ series) Never done    Shingles Vaccine (2 of 2) 09/16/2022    COVID-19 Vaccine (4 - 2023-24 season) 09/01/2023    Eye Exam  09/29/2023        I spent a total of 40 minutes on the day of the visit.This includes face to face time and non-face to face time preparing to see the patient (eg, review of tests), obtaining and/or reviewing separately obtained history, documenting clinical information in the electronic or other health record, independently interpreting results and communicating results to the patient/family/caregiver, or care coordinator.        Return to clinic in 3 months.  WS 2    Darshana Brandon MD  [facility]  Disclaimer:  This note has been generated using voice-recognition software. There may be grammatical or spelling errors that have been missed during proof-reading

## 2024-06-22 NOTE — ASSESSMENT & PLAN NOTE
- Yearly labs- 3/22/24  - Colon cancer screening- no longer indicated  - ACP-completed HPOA 6/20/24.  - Eye exam- last on 2022, referral placed.  - Foot exam- done on 04/10/24.  - Vaccination- due for Tdap, shingles, RSV, COVID booster.

## 2024-06-22 NOTE — ASSESSMENT & PLAN NOTE
Reports occasional dizziness.  Blood pressure stable.  Try to decrease meclizine and stay hydrated.  If dizziness increase been continue as currently.

## 2024-06-22 NOTE — ASSESSMENT & PLAN NOTE
A1cs stable slight improvement of the fasting glucose.  Will try to optimize diet and exercise.  Encourage planning and nutritionist evaluation.

## 2024-07-10 ENCOUNTER — PATIENT MESSAGE (OUTPATIENT)
Dept: PRIMARY CARE CLINIC | Facility: CLINIC | Age: 89
End: 2024-07-10
Payer: MEDICARE

## 2024-07-10 DIAGNOSIS — R42 VERTIGO: ICD-10-CM

## 2024-07-10 DIAGNOSIS — E78.49 OTHER HYPERLIPIDEMIA: ICD-10-CM

## 2024-07-10 RX ORDER — ATORVASTATIN CALCIUM 20 MG/1
20 TABLET, FILM COATED ORAL DAILY
Qty: 90 TABLET | Refills: 3 | Status: SHIPPED | OUTPATIENT
Start: 2024-07-10 | End: 2025-07-10

## 2024-07-10 RX ORDER — MECLIZINE HCL 12.5 MG 12.5 MG/1
12.5 TABLET ORAL 3 TIMES DAILY PRN
Qty: 60 TABLET | Refills: 1 | Status: SHIPPED | OUTPATIENT
Start: 2024-07-10

## 2024-07-15 PROBLEM — Z00.00 HEALTHCARE MAINTENANCE: Status: RESOLVED | Noted: 2024-04-10 | Resolved: 2024-07-15

## 2024-09-16 DIAGNOSIS — R42 VERTIGO: ICD-10-CM

## 2024-09-16 RX ORDER — MECLIZINE HCL 12.5 MG 12.5 MG/1
12.5 TABLET ORAL 3 TIMES DAILY PRN
Qty: 60 TABLET | Refills: 1 | Status: SHIPPED | OUTPATIENT
Start: 2024-09-16

## 2024-09-18 DIAGNOSIS — R35.0 FREQUENCY OF URINATION: ICD-10-CM

## 2024-09-18 DIAGNOSIS — R35.1 BENIGN PROSTATIC HYPERPLASIA WITH NOCTURIA: ICD-10-CM

## 2024-09-18 DIAGNOSIS — N40.1 BENIGN PROSTATIC HYPERPLASIA WITH NOCTURIA: ICD-10-CM

## 2024-09-18 RX ORDER — TAMSULOSIN HYDROCHLORIDE 0.4 MG/1
0.4 CAPSULE ORAL DAILY
Qty: 90 CAPSULE | Refills: 3 | Status: SHIPPED | OUTPATIENT
Start: 2024-09-18

## 2024-09-18 NOTE — TELEPHONE ENCOUNTER
----- Message from Ilana Moreno sent at 9/18/2024  3:19 PM CDT -----  Contact: [phone]- Tristan, son  Requesting an RX refill or new RX.    Is this a refill or new RX:     RX name and strength (tamsulosin (FLOMAX) 0.4 mg Capy/paste from chart):      Is this a 30 day or 90 day RX: 90    Pharmacy name and phone # (      [facility] - [facility] - [address]  [address] [place] [address]  Phone: [phone] Fax: [phone]    Caller states pt has been out of the medication since Monday.

## 2024-09-19 ENCOUNTER — PATIENT MESSAGE (OUTPATIENT)
Dept: PRIMARY CARE CLINIC | Facility: CLINIC | Age: 89
End: 2024-09-19

## 2024-09-19 ENCOUNTER — OFFICE VISIT (OUTPATIENT)
Dept: PRIMARY CARE CLINIC | Facility: CLINIC | Age: 89
End: 2024-09-19
Payer: MEDICARE

## 2024-09-19 VITALS
DIASTOLIC BLOOD PRESSURE: 70 MMHG | OXYGEN SATURATION: 95 % | SYSTOLIC BLOOD PRESSURE: 130 MMHG | HEIGHT: 66 IN | HEART RATE: 82 BPM | BODY MASS INDEX: 28.45 KG/M2 | WEIGHT: 177 LBS

## 2024-09-19 DIAGNOSIS — E11.00 TYPE 2 DIABETES MELLITUS WITH HYPEROSMOLARITY WITHOUT COMA, WITHOUT LONG-TERM CURRENT USE OF INSULIN: ICD-10-CM

## 2024-09-19 DIAGNOSIS — K21.00 GASTROESOPHAGEAL REFLUX DISEASE WITH ESOPHAGITIS WITHOUT HEMORRHAGE: ICD-10-CM

## 2024-09-19 DIAGNOSIS — E11.65 TYPE 2 DIABETES MELLITUS WITH HYPERGLYCEMIA, WITHOUT LONG-TERM CURRENT USE OF INSULIN: Primary | ICD-10-CM

## 2024-09-19 DIAGNOSIS — E78.49 OTHER HYPERLIPIDEMIA: ICD-10-CM

## 2024-09-19 DIAGNOSIS — G50.0 LEFT-SIDED TRIGEMINAL NEURALGIA: ICD-10-CM

## 2024-09-19 DIAGNOSIS — H81.12 BENIGN PAROXYSMAL VERTIGO, LEFT EAR: ICD-10-CM

## 2024-09-19 DIAGNOSIS — Z00.00 HEALTHCARE MAINTENANCE: ICD-10-CM

## 2024-09-19 PROCEDURE — 99999 PR PBB SHADOW E&M-EST. PATIENT-LVL IV: CPT | Mod: PBBFAC,HCNC,, | Performed by: INTERNAL MEDICINE

## 2024-09-19 RX ORDER — METFORMIN HYDROCHLORIDE 500 MG/1
TABLET, EXTENDED RELEASE ORAL
Qty: 90 TABLET | Refills: 2 | Status: SHIPPED | OUTPATIENT
Start: 2024-09-19

## 2024-09-19 RX ORDER — METFORMIN HYDROCHLORIDE 500 MG/1
TABLET, EXTENDED RELEASE ORAL
Qty: 90 TABLET | Refills: 0 | OUTPATIENT
Start: 2024-09-19

## 2024-09-19 RX ORDER — FLUTICASONE PROPIONATE 50 MCG
2 SPRAY, SUSPENSION (ML) NASAL DAILY
Qty: 16 G | Refills: 2 | Status: SHIPPED | OUTPATIENT
Start: 2024-09-19

## 2024-09-19 RX ORDER — OMEPRAZOLE 20 MG/1
20 CAPSULE, DELAYED RELEASE ORAL DAILY
Qty: 90 CAPSULE | Refills: 3 | Status: SHIPPED | OUTPATIENT
Start: 2024-09-19

## 2024-09-19 RX ORDER — ATORVASTATIN CALCIUM 20 MG/1
20 TABLET, FILM COATED ORAL DAILY
Qty: 90 TABLET | Refills: 3 | Status: SHIPPED | OUTPATIENT
Start: 2024-09-19 | End: 2025-09-19

## 2024-09-19 RX ORDER — METFORMIN HYDROCHLORIDE 500 MG/1
TABLET, EXTENDED RELEASE ORAL
Qty: 90 TABLET | Refills: 0 | Status: SHIPPED | OUTPATIENT
Start: 2024-09-19 | End: 2024-09-19 | Stop reason: SDUPTHER

## 2024-09-19 NOTE — PROGRESS NOTES
Subjective:       Patient ID: Norman Saunders is a 89 y.o. male.    Chief Complaint: Diabetes and Hip Pain      HPI  Norman Saunders is a 89 y.o. male with  diabetes mellitus type 2, HLD, depression, trigeminal neuralgia, GERD who presents today for Diabetes and Hip Pain    Norman presents today for follow up.    He reports a fall near the edge of a bathtub approximately one month ago, resulting in pain on right lower back area. He denies losing consciousness during the fall. For pain relief, he uses a topical spray (possibly Icy Hot) at night when the discomfort becomes intolerable, which provides effective relief throughout the night and is improving. He also takes acetaminophen for pain management. He reports that eating sometimes alleviates his pain.    He sometimes uses a cane for ambulation and reports occasional balance issues, particularly when taking a misstep. He denies constant balance problems or recent falls related to dizziness. He reports not walking very far due to concerns about falling.    He takes medications as prescribed. His diabetes has been well controlled on Metformin. Continues medication for trigeminal neuralgia twice daily and the medication for dizziness due to occasional short episodes of dizziness when he turns .     He has two bicycles available at home - one stationary and one that moves independently. He expresses willingness to engage in more exercise, stating he will follow the doctor's advice for his health.    He recently had a dental follow-up and was given a dental plate. He has only used it twice due to discomfort, reporting that a front tooth is causing discomfort and interfering with the use of the dental plate. He expresses the need for an adjustment to prevent the plate from touching the problematic tooth.    He reports occasional sleep disturbances. He uses an unspecified device at night when he can't tolerate the disturbance, which helps him sleep through the night.  The device is set at a very low level. He denies any issues with sleep in the morning.    He reports occasional difficulty hearing, but denies constant hearing issues. He desires exam for hanging skin on his eyelid, present for more than two years. He expresses interest in having the excess skin lifted, emphasizes he does not want surgical intervention, but wants vision checked.     He reports his memory is generally good.      ROS:  General: no fever, no chills, no fatigue, no weight gain, no weight loss  Eyes: no vision changes, no redness, no discharge  ENT: no ear pain, no nasal congestion, no sore throat, reports tooth pain, reports hearing loss  Cardiovascular: no chest pain, no palpitations, no lower extremity edema  Respiratory: no cough, no shortness of breath  Gastrointestinal: no abdominal pain, no nausea, no vomiting, no diarrhea, no constipation, no blood in stool  Genitourinary: no dysuria, no hematuria, no frequency  Musculoskeletal: reports joint and back pain, no muscle pain  Skin: no rash, reports lesion  Neurological: no headache, occasional dizziness, no numbness, no tingling  Psychiatric: no anxiety, no depression, reports sleep difficulty, no memory problems        Health Maintenance:  Health Maintenance   Topic Date Due    TETANUS VACCINE  Never done    Shingles Vaccine (2 of 2) 09/16/2022    Eye Exam  09/29/2023    Hemoglobin A1c  12/14/2024    Lipid Panel  06/14/2025         Past Medical History:   Diagnosis Date    Cataract     COVID-19 01/27/2022    GERD (gastroesophageal reflux disease)     Trigeminal neuralgia     Type 2 diabetes mellitus with hyperglycemia, without long-term current use of insulin 5/26/2021       Past Surgical History:   Procedure Laterality Date    CATARACT EXTRACTION         Family History   Problem Relation Name Age of Onset    No Known Problems Mother      No Known Problems Father      Amblyopia Neg Hx      Blindness Neg Hx      Cancer Neg Hx      Cataracts Neg Hx       Diabetes Neg Hx      Glaucoma Neg Hx      Hypertension Neg Hx      Macular degeneration Neg Hx      Retinal detachment Neg Hx      Strabismus Neg Hx      Stroke Neg Hx      Thyroid disease Neg Hx         Social History     Socioeconomic History    Marital status:    Tobacco Use    Smoking status: Never    Smokeless tobacco: Never   Substance and Sexual Activity    Alcohol use: No    Drug use: No       Current Outpatient Medications   Medication Sig Dispense Refill    acetic acid (VOSOL) 2 % otic solution Place 4 drops into both ears 3 (three) times daily as needed (itching). 6 mL 1    blood sugar diagnostic Strp To check BG 2 times daily, to use with insurance preferred meter 100 strip 11    fluocinolone acetonide oiL (DERMOTIC OIL) 0.01 % Drop Place 3 drops in ear(s) 2 (two) times daily. 20 mL 3    lancets Misc To check BG 2 times daily, to use with insurance preferred meter 100 each 11    meclizine (ANTIVERT) 12.5 mg tablet Take 1 tablet (12.5 mg total) by mouth 3 (three) times daily as needed for Dizziness. 60 tablet 1    metFORMIN (GLUCOPHAGE-XR) 500 MG ER 24hr tablet TOME JAK TABLETA TODOS LOS SWENSON CON EL DESAYUNO 90 tablet 2    OXcarbazepine (TRILEPTAL) 300 MG Tab TOME 1 TABLETA DOS VECES AL GRANT 180 tablet 2    tamsulosin (FLOMAX) 0.4 mg Cap Take 1 capsule (0.4 mg total) by mouth once daily. 90 capsule 3    aspirin (ECOTRIN) 81 MG EC tablet Take 1 tablet (81 mg total) by mouth once daily. 30 tablet 11    atorvastatin (LIPITOR) 20 MG tablet Take 1 tablet (20 mg total) by mouth once daily. 90 tablet 3    blood-glucose meter kit To check BG 2 times daily, to use with insurance preferred meter 1 each 5    fluticasone propionate (FLONASE) 50 mcg/actuation nasal spray 2 sprays (100 mcg total) by Each Nostril route once daily. 16 g 2    omeprazole (PRILOSEC) 20 MG capsule Take 1 capsule (20 mg total) by mouth once daily. Para el reflujo. 90 capsule 3     No current facility-administered medications for this  "visit.       Review of patient's allergies indicates:  No Known Allergies      Objective:       Last 3 sets of Vitals        4/10/2024     3:36 PM 6/20/2024     2:02 PM 9/19/2024     2:05 PM   Vitals - 1 value per visit   SYSTOLIC 134 128 146   DIASTOLIC 72 72 82   Pulse  72 82   SPO2 97 % 95 % 95 %   Weight (lb) 177.36 178.79 177.03   Weight (kg) 80.45 81.1 80.3   Height 5' 6" (1.676 m) 5' 6" (1.676 m) 5' 6" (1.676 m)   BMI (Calculated) 28.6 28.9 28.6   Pain Score Zero Zero Six   Physical Exam  Constitutional:       General: He is not in acute distress.     Appearance: Normal appearance.   HENT:      Head: Normocephalic.      Right Ear: Tympanic membrane, ear canal and external ear normal.      Left Ear: Tympanic membrane, ear canal and external ear normal.      Nose: Nose normal.      Mouth/Throat:      Mouth: Mucous membranes are moist.   Eyes:      General: No scleral icterus.     Extraocular Movements: Extraocular movements intact.      Conjunctiva/sclera: Conjunctivae normal.      Comments: Eyelids with no obvious interference of visual fields.   Neck:      Vascular: No carotid bruit.      Comments: No goiter.  Cardiovascular:      Rate and Rhythm: Normal rate and regular rhythm.      Pulses: Normal pulses.      Heart sounds: Normal heart sounds.   Pulmonary:      Effort: Pulmonary effort is normal.      Breath sounds: Normal breath sounds.   Abdominal:      General: Bowel sounds are normal. There is no distension.      Palpations: Abdomen is soft. There is no mass.      Tenderness: There is no abdominal tenderness.   Musculoskeletal:         General: No swelling. Normal range of motion.   Lymphadenopathy:      Cervical: No cervical adenopathy.   Skin:     General: Skin is warm and dry.   Neurological:      General: No focal deficit present.      Mental Status: He is alert and oriented to person, place, and time.   Psychiatric:         Mood and Affect: Mood normal.         Behavior: Behavior normal.       "     CBC:  Recent Labs   Lab 10/18/22  0822 05/12/23  1620 03/22/24  0824   WBC 7.29 6.66 7.94   RBC 5.05 5.05 5.06   Hemoglobin 14.1 14.0 13.8 L   Hematocrit 42.6 42.2 41.3   Platelets 220 220 254   MCV 84 84 82   MCH 27.9 27.7 27.3   MCHC 33.1 33.2 33.4     CMP:  Recent Labs   Lab 05/12/23  1620 03/22/24  0824 06/14/24  0815   Glucose 107 140 H 133 H   Calcium 9.4 9.4 9.3   Albumin 4.2 3.8 3.7   Total Protein 7.9 7.6 7.4   Sodium 140 134 L 144   Potassium 4.1 4.2 3.9   CO2 22 L 24 23   Chloride 108 98 110   BUN 21 12 21   Creatinine 1.1 1.0 1.1   Alkaline Phosphatase 79 105 99   ALT 16 12 10   AST 18 13 12   Total Bilirubin 0.5 0.3 0.3     URINALYSIS:  Recent Labs   Lab 01/28/22  0035 02/11/22  1221   Color, UA Yellow Yellow   Specific Gravity, UA >1.030 A 1.030   pH, UA 7.0 6.0   Protein, UA 2+ A 1+ A   Bacteria None Rare   Nitrite, UA Negative Negative   Leukocytes, UA Negative Negative   Urobilinogen, UA Negative 2.0-3.0 A   Hyaline Casts, UA 0 1      LIPIDS:  Recent Labs   Lab 04/21/22  1432 10/18/22  0822 05/12/23  1620 03/22/24  0824 06/14/24  0815   TSH 1.704  --  1.498 3.727  --    HDL 48   < > 49 51 47   Cholesterol 224 H   < > 205 H 208 H 130   Triglycerides 128   < > 119 91 67   LDL Cholesterol 150.4   < > 132.2 138.8 69.6   HDL/Cholesterol Ratio 21.4   < > 23.9 24.5 36.2   Non-HDL Cholesterol 176   < > 156 157 83   Total Cholesterol/HDL Ratio 4.7   < > 4.2 4.1 2.8    < > = values in this interval not displayed.     TSH:  Recent Labs   Lab 04/21/22  1432 05/12/23  1620 03/22/24  0824   TSH 1.704 1.498 3.727       A1C:  Recent Labs   Lab 02/11/22  1255 04/21/22  1432 10/18/22  0822 05/12/23  1620 03/22/24  0824 06/14/24  0815   Hemoglobin A1C 6.9 H 6.6 H 6.2 H 6.2 H 6.6 H 6.6 H       Imaging:  X-Ray Lumbar Spine AP And Lateral  Narrative: EXAMINATION:  XR LUMBAR SPINE AP AND LATERAL    CLINICAL HISTORY:  Low back pain, unspecified    TECHNIQUE:  AP, lateral and spot images were performed of the lumbar  spine.    COMPARISON:  Lumbar spine, 06/01/2006    FINDINGS:  Bony structures appear in tact with generalized spondylosis with marginal osteophytes and disc space narrowing throughout.  Alignment is maintained.  Pedicles demonstrate no destruction  Impression: Increasing spondylosis throughout lumbar spine with no evidence of acute fracture.    Electronically signed by: Lázaro Barber  Date:    02/24/2022  Time:    10:09      Assessment:       1. Type 2 diabetes mellitus with hyperglycemia, without long-term current use of insulin    2. Other hyperlipidemia    3. Benign paroxysmal vertigo, left ear    4. Left-sided trigeminal neuralgia    5. Gastroesophageal reflux disease with esophagitis without hemorrhage    6. Healthcare maintenance            Plan:       1. Type 2 diabetes mellitus with hyperglycemia, without long-term current use of insulin  Overview:  On metformin daily    Assessment & Plan:  - A1cs stable at 6.6%.  -  Will try to optimize diet and exercise.  Encourage planning and nutritionist evaluation.  - Assessed blood pressure, finding it within normal range (130/70)  - Considered adding medication for kidney protection due to diabetes, despite normal blood pressure.  - Eye exam ordered for diabetes.  - Referred to eye specialist for diabetic retinopathy assessment.    Orders:  -     Comprehensive Metabolic Panel; Future; Expected date: 09/19/2024  -     Hemoglobin A1C; Future; Expected date: 09/19/2024  -     Ambulatory referral/consult to Optometry; Future; Expected date: 09/19/2024    2. Other hyperlipidemia  Overview:  Tolerating atorvastatin atorvastatin 20 mg daily    Assessment & Plan:  Last LDL less than 70 less than 70 mg/dL, at goal.  - Encuraged to exercise regularly, starting with 5 minutes of slow pedaling daily.  - Recommend using [facility] as a potential location for walking exercise.    Orders:  -     atorvastatin (LIPITOR) 20 MG tablet; Take 1 tablet (20 mg total) by mouth once daily.   Dispense: 90 tablet; Refill: 3    3. Benign paroxysmal vertigo, left ear  Overview:  On Meclizine as needed.    Assessment & Plan:  - Explained that meclizine for dizziness can be taken as needed rather than daily.  - Decreased meclizine to as-needed basis for dizziness, rather than daily use.  - Encourage Flonase    Orders:  -     fluticasone propionate (FLONASE) 50 mcg/actuation nasal spray; 2 sprays (100 mcg total) by Each Nostril route once daily.  Dispense: 16 g; Refill: 2  -     CBC Auto Differential; Future; Expected date: 09/19/2024    4. Left-sided trigeminal neuralgia  Overview:  On Oxcarmazepine 300mg twice a day.      Assessment & Plan:  - Continued Trileptal (oxcarbazepine) twice daily for trigeminal neuralgia.      5. Gastroesophageal reflux disease with esophagitis without hemorrhage  Assessment & Plan:  - Continued Prilosec (omeprazole) for reflux.    Orders:  -     omeprazole (PRILOSEC) 20 MG capsule; Take 1 capsule (20 mg total) by mouth once daily. Para el reflujo.  Dispense: 90 capsule; Refill: 3    6. Healthcare maintenance  Assessment & Plan:  - Yearly labs- 3/22/24  - Colon cancer screening- no longer indicated  - ACP-completed HPOA 6/20/24.  - Eye exam- last on 2022, referral placed.  - Foot exam- done on 04/10/24.  - Vaccination- due for Flu, Tdap, shingles, RSV, COVID booster.           Health Maintenance Due   Topic Date Due    TETANUS VACCINE  Never done    RSV Vaccine (Age 60+ and Pregnant patients) (1 - 1-dose 60+ series) Never done    Shingles Vaccine (2 of 2) 09/16/2022    Eye Exam  09/29/2023    Influenza Vaccine (1) 09/01/2024    COVID-19 Vaccine (4 - 2023-24 season) 09/01/2024        I spent a total of 30 minutes on the day of the visit.  This includes face to face time and non-face to face time preparing to see the patient (eg, review of tests), obtaining and/or reviewing separately obtained history, documenting clinical information in the electronic or other health record,  independently interpreting results and communicating results to the patient/family/caregiver, or care coordinator.       Return to clinic in 3 months.  WS 2    Darshana Brandon MD  [facility]  Disclaimer:  This note has been generated using voice-recognition software. There may be grammatical or spelling errors that have been missed during proof-reading

## 2024-09-20 PROBLEM — H81.12 BENIGN PAROXYSMAL VERTIGO, LEFT EAR: Status: ACTIVE | Noted: 2024-09-20

## 2024-09-21 NOTE — ASSESSMENT & PLAN NOTE
Last LDL less than 70 less than 70 mg/dL, at goal.  - Encuraged to exercise regularly, starting with 5 minutes of slow pedaling daily.  - Recommend using [facility] as a potential location for walking exercise.

## 2024-09-21 NOTE — ASSESSMENT & PLAN NOTE
- Yearly labs- 3/22/24  - Colon cancer screening- no longer indicated  - ACP-completed HPOA 6/20/24.  - Eye exam- last on 2022, referral placed.  - Foot exam- done on 04/10/24.  - Vaccination- due for Flu, Tdap, shingles, RSV, COVID booster.

## 2024-09-21 NOTE — ASSESSMENT & PLAN NOTE
- A1cs stable at 6.6%.  -  Will try to optimize diet and exercise.  Encourage planning and nutritionist evaluation.  - Assessed blood pressure, finding it within normal range (130/70)  - Considered adding medication for kidney protection due to diabetes, despite normal blood pressure.  - Eye exam ordered for diabetes.  - Referred to eye specialist for diabetic retinopathy assessment.

## 2024-09-21 NOTE — ASSESSMENT & PLAN NOTE
- Explained that meclizine for dizziness can be taken as needed rather than daily.  - Decreased meclizine to as-needed basis for dizziness, rather than daily use.  - Encourage Flonase

## 2024-09-26 NOTE — PROGRESS NOTES
Chronic Pain - Established Visit    Referring Physician: Piyush Mcmillan    Chief Complaint:   Chief Complaint   Patient presents with    Headache     pain on left jaw        SUBJECTIVE: Disclaimer: This note has been generated using voice-recognition software. There may be typographical errors that have been missed during proof-reading    Interval History 4/7/2017:  The patient present to clinic today for follow up of left jaw pain. He presents with his daughter today, who is active in his care. He reports that he is taking Gabapentin 600 mg TID with benefit. He continues to report left-sided face pain that radiates around his eye to the outer corner of his mouth. He denies any other health changes. His pain today is 7/10.     Initial encounter:    Norman Saunders presents to the clinic for the evaluation of trigeminal neuralgia pain. The pain started 2 years ago insidiously (no herpes zoster) and symptoms have been unchanged.    Brief history:    Pain Description:    The pain is located in the left side of the face, periorbitally and over the maxilla V2 distribution      At BEST  8/10     At WORST  10/10 on the WORST day.      On average pain is rated as 8/10.     Today the pain is rated as 8/10    The pain is described as burning, tingling and intermittent      Symptoms interfere with daily activity and sleeping.     Exacerbating factors: Touching.      Mitigating factors nothing.     Patient denies urinary incontinence, bowel incontinence and significant weight loss.  Patient denies any suicidal or homicidal ideations    Pain Medications:  Current:  Gabapentin 600mg TID    Tried in Past:  NSAIDs -Never  TCA -Never  SNRI -Never  Anti-convulsants -Never  Muscle Relaxants -Never  Opioids-Never    Physical Therapy/Home Exercise: no       report:  Reviewed and consistent with medication use as prescribed.    Pain Procedures: none    Chiropractor -never  Acupuncture - never  TENS unit -never  Spinal  decompression -never  Joint replacement -never    Imaging: none available for review today    Past Medical History:   Diagnosis Date    Cataract     GERD (gastroesophageal reflux disease)      Past Surgical History:   Procedure Laterality Date    CATARACT EXTRACTION       Social History     Social History    Marital status:      Spouse name: N/A    Number of children: N/A    Years of education: N/A     Occupational History    Not on file.     Social History Main Topics    Smoking status: Never Smoker    Smokeless tobacco: Not on file    Alcohol use No    Drug use: No    Sexual activity: Not on file     Other Topics Concern    Not on file     Social History Narrative     Family History   Problem Relation Age of Onset    Amblyopia Neg Hx     Blindness Neg Hx     Cancer Neg Hx     Cataracts Neg Hx     Diabetes Neg Hx     Glaucoma Neg Hx     Hypertension Neg Hx     Macular degeneration Neg Hx     Retinal detachment Neg Hx     Strabismus Neg Hx     Stroke Neg Hx     Thyroid disease Neg Hx        Review of patient's allergies indicates:  No Known Allergies    Current Outpatient Prescriptions   Medication Sig    gabapentin (NEURONTIN) 300 MG capsule Take 1 capsule (300 mg total) by mouth 3 (three) times daily.    gabapentin (NEURONTIN) 600 MG tablet Increase to 1 tablet in AM and 1 in PM for 7 days, then increase to 1 tablet three times a day    meclizine (ANTIVERT) 12.5 mg tablet Take 1 tablet (12.5 mg total) by mouth 2 (two) times daily as needed.    omeprazole (PRILOSEC) 20 MG capsule Take 1 capsule (20 mg total) by mouth once daily.    omeprazole (PRILOSEC) 20 MG capsule Take 1 capsule (20 mg total) by mouth once daily.    clotrimazole (LOTRIMIN) 1 % cream Apply topically 2 (two) times daily.    clotrimazole-betamethasone 1-0.05% (LOTRISONE) cream APPLY ONE CREAM EXTERNALLY TWICE DAILY AFFECTED AREA    diclofenac sodium (VOLTAREN) 1 % Gel Apply 2 g topically once daily.     "econazole nitrate 1 % cream Apply topically 2 (two) times daily. To affected area x 4 wks    prednisoLONE acetate (PRED FORTE) 1 % ophthalmic suspension 1 drop 4 (four) times daily.     No current facility-administered medications for this visit.        REVIEW OF SYSTEMS:    GENERAL:  No weight loss, malaise or fevers.  HEENT:   No recent changes in vision or hearing  NECK:  Negative for lumps, no difficulty with swallowing.  RESPIRATORY:  Negative for cough, wheezing or shortness of breath, patient denies any recent URI.  CARDIOVASCULAR:  Negative for chest pain, leg swelling or palpitations.  GI:  Negative for abdominal discomfort, blood in stools or black stools or change in bowel habits. GERD controlled with omeprazole.  MUSCULOSKELETAL:  See HPI.  SKIN:  Negative for lesions, rash, and itching.  PSYCH:  No mood disorder or recent psychosocial stressors.  Patient's sleep is not disturbed secondary to pain.  HEMATOLOGY/LYMPHOLOGY:  Negative for prolonged bleeding, bruising easily or swollen nodes.  Patient is not currently taking any anti-coagulants  ENDO: No history of diabetes or thyroid dysfunction  NEURO:   No history of headaches, syncope, paralysis, seizures or tremors.  All other reviewed and negative other than HPI.    OBJECTIVE:    BP (!) 151/80  Pulse 69  Temp 98.5 °F (36.9 °C) (Oral)   Resp 20  Ht 5' 6" (1.676 m)  Wt 84 kg (185 lb 3.2 oz)  BMI 29.89 kg/m2    PHYSICAL EXAMINATION:    GENERAL: Well appearing, in no acute distress, alert and oriented x3.  PSYCH:  Mood and affect appropriate.  SKIN: Skin color, texture, turgor normal, no rashes or lesions.  HEAD/FACE:  Normocephalic, atraumatic. No pain to palpation over the area or evidence of zoster rash. Cranial nerves grossly intact.  NECK: No pain to palpation over the cervical paraspinous muscles. Spurling Negative. No pain with neck flexion, extension, or lateral flexion.   CV: RRR with palpation of the radial artery.  PULM: No evidence of " respiratory difficulty, symmetric chest rise.  EXTREMITIES: Peripheral joint ROM is full and pain free without obvious instability or laxity in all four extremities. No deformities, edema, or skin discoloration. Good capillary refill.  MUSCULOSKELETAL: Shoulder  provocative maneuvers are negative.  There is no pain with palpation over the sacroiliac joints bilaterally.  FABERs test is negative.  FADIRs test is negative.   Bilateral upper and lower extremity strength is normal and symmetric.  No atrophy or tone abnormalities are noted.  NEURO: Bilateral upper and lower extremity coordination and muscle stretch reflexes are physiologic and symmetric.  Plantar response are downgoing. No clonus.  No loss of sensation is noted.  GAIT: normal.    ASSESSMENT: 84 y.o. year old male with pain, consistent with     Encounter Diagnosis   Name Primary?    Trigeminal neuralgia Yes       PLAN:     - Increase Gabapentin 2400 mg daily.     - Referral to neurology per daughter's request.     - We discussed V2 distribution injection, but the patient is not interested at this time.     - RTC in 1 month or sooner if needed.     The above plan and management options were discussed at length with patient. Patient is in agreement with the above and verbalized understanding.     Nancy Todd NP  04/07/2017     No

## 2024-12-09 ENCOUNTER — TELEPHONE (OUTPATIENT)
Dept: OPTOMETRY | Facility: CLINIC | Age: 89
End: 2024-12-09
Payer: MEDICARE

## 2024-12-12 DIAGNOSIS — R42 VERTIGO: ICD-10-CM

## 2024-12-13 ENCOUNTER — LAB VISIT (OUTPATIENT)
Dept: LAB | Facility: HOSPITAL | Age: 89
End: 2024-12-13
Attending: INTERNAL MEDICINE
Payer: MEDICARE

## 2024-12-13 DIAGNOSIS — E11.65 TYPE 2 DIABETES MELLITUS WITH HYPERGLYCEMIA, WITHOUT LONG-TERM CURRENT USE OF INSULIN: ICD-10-CM

## 2024-12-13 DIAGNOSIS — H81.12 BENIGN PAROXYSMAL VERTIGO, LEFT EAR: ICD-10-CM

## 2024-12-13 LAB
ALBUMIN SERPL BCP-MCNC: 4 G/DL (ref 3.5–5.2)
ALP SERPL-CCNC: 102 U/L (ref 40–150)
ALT SERPL W/O P-5'-P-CCNC: 12 U/L (ref 10–44)
ANION GAP SERPL CALC-SCNC: 12 MMOL/L (ref 8–16)
AST SERPL-CCNC: 16 U/L (ref 10–40)
BASOPHILS # BLD AUTO: 0.09 K/UL (ref 0–0.2)
BASOPHILS NFR BLD: 1.2 % (ref 0–1.9)
BILIRUB SERPL-MCNC: 0.3 MG/DL (ref 0.1–1)
BUN SERPL-MCNC: 12 MG/DL (ref 10–30)
CALCIUM SERPL-MCNC: 9.5 MG/DL (ref 8.7–10.5)
CHLORIDE SERPL-SCNC: 100 MMOL/L (ref 95–110)
CO2 SERPL-SCNC: 24 MMOL/L (ref 23–29)
CREAT SERPL-MCNC: 1 MG/DL (ref 0.5–1.4)
DIFFERENTIAL METHOD BLD: ABNORMAL
EOSINOPHIL # BLD AUTO: 0.4 K/UL (ref 0–0.5)
EOSINOPHIL NFR BLD: 5 % (ref 0–8)
ERYTHROCYTE [DISTWIDTH] IN BLOOD BY AUTOMATED COUNT: 13.6 % (ref 11.5–14.5)
EST. GFR  (NO RACE VARIABLE): >60 ML/MIN/1.73 M^2
ESTIMATED AVG GLUCOSE: 146 MG/DL (ref 68–131)
GLUCOSE SERPL-MCNC: 135 MG/DL (ref 70–110)
HBA1C MFR BLD: 6.7 % (ref 4–5.6)
HCT VFR BLD AUTO: 41.1 % (ref 40–54)
HGB BLD-MCNC: 13.6 G/DL (ref 14–18)
IMM GRANULOCYTES # BLD AUTO: 0.02 K/UL (ref 0–0.04)
IMM GRANULOCYTES NFR BLD AUTO: 0.3 % (ref 0–0.5)
LYMPHOCYTES # BLD AUTO: 2.1 K/UL (ref 1–4.8)
LYMPHOCYTES NFR BLD: 28.4 % (ref 18–48)
MCH RBC QN AUTO: 27.9 PG (ref 27–31)
MCHC RBC AUTO-ENTMCNC: 33.1 G/DL (ref 32–36)
MCV RBC AUTO: 84 FL (ref 82–98)
MONOCYTES # BLD AUTO: 0.7 K/UL (ref 0.3–1)
MONOCYTES NFR BLD: 9.6 % (ref 4–15)
NEUTROPHILS # BLD AUTO: 4.1 K/UL (ref 1.8–7.7)
NEUTROPHILS NFR BLD: 55.5 % (ref 38–73)
NRBC BLD-RTO: 0 /100 WBC
PLATELET # BLD AUTO: 229 K/UL (ref 150–450)
PMV BLD AUTO: 10.5 FL (ref 9.2–12.9)
POTASSIUM SERPL-SCNC: 4.1 MMOL/L (ref 3.5–5.1)
PROT SERPL-MCNC: 7.5 G/DL (ref 6–8.4)
RBC # BLD AUTO: 4.88 M/UL (ref 4.6–6.2)
SODIUM SERPL-SCNC: 136 MMOL/L (ref 136–145)
WBC # BLD AUTO: 7.4 K/UL (ref 3.9–12.7)

## 2024-12-13 PROCEDURE — 80053 COMPREHEN METABOLIC PANEL: CPT | Mod: HCNC | Performed by: INTERNAL MEDICINE

## 2024-12-13 PROCEDURE — 83036 HEMOGLOBIN GLYCOSYLATED A1C: CPT | Mod: HCNC | Performed by: INTERNAL MEDICINE

## 2024-12-13 PROCEDURE — 85025 COMPLETE CBC W/AUTO DIFF WBC: CPT | Mod: HCNC | Performed by: INTERNAL MEDICINE

## 2024-12-13 PROCEDURE — 36415 COLL VENOUS BLD VENIPUNCTURE: CPT | Mod: HCNC | Performed by: INTERNAL MEDICINE

## 2024-12-13 RX ORDER — MECLIZINE HCL 12.5 MG 12.5 MG/1
12.5 TABLET ORAL 3 TIMES DAILY PRN
Qty: 60 TABLET | Refills: 1 | Status: SHIPPED | OUTPATIENT
Start: 2024-12-13

## 2024-12-19 ENCOUNTER — OFFICE VISIT (OUTPATIENT)
Dept: PRIMARY CARE CLINIC | Facility: CLINIC | Age: 89
End: 2024-12-19
Payer: MEDICARE

## 2024-12-19 DIAGNOSIS — Z00.00 HEALTHCARE MAINTENANCE: ICD-10-CM

## 2024-12-19 DIAGNOSIS — I10 PRIMARY HYPERTENSION: Primary | ICD-10-CM

## 2024-12-19 DIAGNOSIS — G50.0 LEFT-SIDED TRIGEMINAL NEURALGIA: ICD-10-CM

## 2024-12-19 DIAGNOSIS — Z23 NEEDS FLU SHOT: ICD-10-CM

## 2024-12-19 DIAGNOSIS — R42 VERTIGO: ICD-10-CM

## 2024-12-19 DIAGNOSIS — E78.5 HYPERLIPIDEMIA, UNSPECIFIED HYPERLIPIDEMIA TYPE: ICD-10-CM

## 2024-12-19 DIAGNOSIS — E11.65 TYPE 2 DIABETES MELLITUS WITH HYPERGLYCEMIA, WITHOUT LONG-TERM CURRENT USE OF INSULIN: ICD-10-CM

## 2024-12-19 PROCEDURE — 1160F RVW MEDS BY RX/DR IN RCRD: CPT | Mod: HCNC,CPTII,S$GLB, | Performed by: INTERNAL MEDICINE

## 2024-12-19 PROCEDURE — 1126F AMNT PAIN NOTED NONE PRSNT: CPT | Mod: HCNC,CPTII,S$GLB, | Performed by: INTERNAL MEDICINE

## 2024-12-19 PROCEDURE — 99999 PR PBB SHADOW E&M-EST. PATIENT-LVL IV: CPT | Mod: PBBFAC,HCNC,, | Performed by: INTERNAL MEDICINE

## 2024-12-19 PROCEDURE — 90653 IIV ADJUVANT VACCINE IM: CPT | Mod: HCNC,S$GLB,, | Performed by: INTERNAL MEDICINE

## 2024-12-19 PROCEDURE — 99215 OFFICE O/P EST HI 40 MIN: CPT | Mod: HCNC,S$GLB,, | Performed by: INTERNAL MEDICINE

## 2024-12-19 PROCEDURE — G0008 ADMIN INFLUENZA VIRUS VAC: HCPCS | Mod: HCNC,S$GLB,, | Performed by: INTERNAL MEDICINE

## 2024-12-19 PROCEDURE — 3288F FALL RISK ASSESSMENT DOCD: CPT | Mod: HCNC,CPTII,S$GLB, | Performed by: INTERNAL MEDICINE

## 2024-12-19 PROCEDURE — 1101F PT FALLS ASSESS-DOCD LE1/YR: CPT | Mod: HCNC,CPTII,S$GLB, | Performed by: INTERNAL MEDICINE

## 2024-12-19 PROCEDURE — 1159F MED LIST DOCD IN RCRD: CPT | Mod: HCNC,CPTII,S$GLB, | Performed by: INTERNAL MEDICINE

## 2024-12-19 RX ORDER — LOSARTAN POTASSIUM 25 MG/1
12.5 TABLET ORAL DAILY
Qty: 30 TABLET | Refills: 3 | Status: SHIPPED | OUTPATIENT
Start: 2024-12-19 | End: 2025-12-19

## 2024-12-19 RX ORDER — OXCARBAZEPINE 300 MG/1
TABLET, FILM COATED ORAL
Qty: 180 TABLET | Refills: 2 | Status: SHIPPED | OUTPATIENT
Start: 2024-12-19

## 2024-12-19 NOTE — PATIENT INSTRUCTIONS
Buscar pagina en internet: Diabetes food hub de la American Diabetes Association.    Cuando tienes diabetes tipo 2, lo que comes puede ayudarte a controlar tu nivel de azúcar en mike, evitar el hambre y sentirte lleno por más tiempo.  La diabetes es cuando los niveles de azúcar en mike o glucosa son más altos de lo normal. Son los alimentos con carbohidratos randy panes, cereales, arroz, pasta, frutas, leche y postres los que pueden causar qi aumento.  Herr plan de alimentación debe centrarse en la cantidad y el tipo de carbohidratos que pone en herr plato sylvester todo el día.    1. Vegetales crudos, cocidos o asados- Estos agregan color, sabor y textura a krzysztof comida. Elige sabrosas verduras bajas en carbohidratos, randy champiñones/setas, cebollas, berenjenas, tomates, coles de Bruselas y calabazas bajas en carbohidratos, randy calabacín. Pruébalos con salsas randy aderezos bajos en grasa, hummus, guacamole y salsa, o asados con diferentes condimentos randy koenig, yovana de cayena o ajo.    2. Greens/Verdes- Ir más allá de herr ensalada regular y pruebe col rizada (kale), espinacas, y acelga (chard). Son saludables, deliciosos y bajos en carbohidratos. Hojas de col rizada asadas en el horno con aceite de purdy para patatas fritas rápidas y crujientes. También puedes mezclar verduras con verduras tostadas para añadir textura y un sabor diferente, o servirlas con un poco de proteína, randy el salmón.    3. Bebidas sabrosas y bajas en calorías- El agua normal siempre es buena, joe el agua infundida con frutas y verduras es más interesante. Corta un jamie o pepino y ponlo en tu agua, o haz cubitos de hielo con un poco de sabor.  Si no eres un bebedor de té caliente, prueba el té frío con jamie o un palito de joshua. Estas bebidas no solo son bajas en carbohidratos, sino que también pueden ayudarte a llenarte para que no ansías otros alimentos.    4. Bayas (Berries)- ¿Sabías que 1 taza de cualquiera de estos tiene sólo 15  gramos de carbohidratos? Es un poco más kiser, joe es un regalo saludable lleno de nutrientes y fibra, y es un poco susy. Para un toque diferente, mezcle las bayas con yogur natural, o colóquelas en cubitos de hielo.    5. Alimentos integrales y de fibra más dion- Pruebe legumbres randy frijoles secos, guisantes y lentejas. Estos alimentos todavía tienen carbohidratos, joe tienen sabores interesantes que ayudan a mantenerte satisfecho.    6. Un poco de grasa- Las buenas opciones de grasa incluyen aceite de purdy, aguacate y pescados grasos -- piensen que el salmón servido en un lecho de alcon, por ejemplo.    7. Yogur marcos de proteínas, queso cottage, huevos y andreea magras.     Y no olvides las golosinas- La mantequilla de maní en un palillo de apio es krzysztof buena mezcla de grasa y proteínas para un aperitivo saludable y satisfactorio. También puedes picar en un palillo de queso con bajo contenido de grasa o en un palito de carne de res, joe vigila la cantidad de sodio que hay en ellos.

## 2024-12-22 VITALS
WEIGHT: 180.75 LBS | BODY MASS INDEX: 29.05 KG/M2 | SYSTOLIC BLOOD PRESSURE: 158 MMHG | DIASTOLIC BLOOD PRESSURE: 68 MMHG | OXYGEN SATURATION: 96 % | HEIGHT: 66 IN | HEART RATE: 83 BPM

## 2024-12-22 PROBLEM — I10 PRIMARY HYPERTENSION: Status: ACTIVE | Noted: 2024-12-22

## 2024-12-22 NOTE — PROGRESS NOTES
Subjective:       Patient ID: Norman Saunders is a 89 y.o. male.    Chief Complaint: Diabetes      Diabetes      Norman Saunders is a 89 y.o. male with  diabetes mellitus type 2, HLD, depression, trigeminal neuralgia, GERD who presents today for Diabetes    Norman presents today for follow-up regarding diabetes management and blood pressure concerns.    He reports feeling good despite elevated blood pressure readings. He does not check blood pressure at home and is not currently taking any antihypertensive medications.  Encourage to enroll to digital medicine program.    He has mild diabetes that has been with metformin. His morning blood sugar are slightly elevated, though his A1C is 6.5, below the target of 7. He reports a family history of diabetes. He is maintaining a healthy diet and started walking.    He currently engages in minimal exercise but enjoys walking and expresses willingness to gradually increase physical activity level.    Reports occasional dizziness that controls with meclizine.  Has been taking meclizine low dose once a day as he notices disequilibrium.  Feels the medication helps.  Clarify that the medication is to be used as needed as long term use may cause dizziness and tremors.      Eyes were examined in January.    ROS:  General: -fever, -chills, -fatigue, -weight gain, -weight loss  Eyes: -vision changes, -redness, -discharge  ENT: -ear pain, -nasal congestion, -sore throat  Cardiovascular: -chest pain, -palpitations, -lower extremity edema  Respiratory: -cough, -shortness of breath  Gastrointestinal: -abdominal pain, -nausea, -vomiting, -diarrhea, -constipation, -blood in stool  Genitourinary: -dysuria, -hematuria, -frequency  Musculoskeletal: -joint pain, -muscle pain  Skin: -rash, -lesion  Neurological: -headache, -dizziness, -numbness, -tingling  Psychiatric: -anxiety, -depression, -sleep difficulty          Past Medical History:   Diagnosis Date    Cataract     COVID-19 01/27/2022     GERD (gastroesophageal reflux disease)     Primary hypertension 12/22/2024    Trigeminal neuralgia     Type 2 diabetes mellitus with hyperglycemia, without long-term current use of insulin 5/26/2021       Past Surgical History:   Procedure Laterality Date    CATARACT EXTRACTION         Family History   Problem Relation Name Age of Onset    No Known Problems Mother      No Known Problems Father      Amblyopia Neg Hx      Blindness Neg Hx      Cancer Neg Hx      Cataracts Neg Hx      Diabetes Neg Hx      Glaucoma Neg Hx      Hypertension Neg Hx      Macular degeneration Neg Hx      Retinal detachment Neg Hx      Strabismus Neg Hx      Stroke Neg Hx      Thyroid disease Neg Hx         Social History     Socioeconomic History    Marital status:    Tobacco Use    Smoking status: Never    Smokeless tobacco: Never   Substance and Sexual Activity    Alcohol use: No    Drug use: No       Current Outpatient Medications   Medication Sig Dispense Refill    acetic acid (VOSOL) 2 % otic solution Place 4 drops into both ears 3 (three) times daily as needed (itching). 6 mL 1    aspirin (ECOTRIN) 81 MG EC tablet Take 1 tablet (81 mg total) by mouth once daily. 30 tablet 11    atorvastatin (LIPITOR) 20 MG tablet Take 1 tablet (20 mg total) by mouth once daily. 90 tablet 3    fluocinolone acetonide oiL (DERMOTIC OIL) 0.01 % Drop Place 3 drops in ear(s) 2 (two) times daily. 20 mL 3    fluticasone propionate (FLONASE) 50 mcg/actuation nasal spray 2 sprays (100 mcg total) by Each Nostril route once daily. 16 g 2    lancets Misc To check BG 2 times daily, to use with insurance preferred meter 100 each 11    meclizine (ANTIVERT) 12.5 mg tablet Take 1 tablet (12.5 mg total) by mouth 3 (three) times daily as needed for Dizziness. 60 tablet 1    metFORMIN (GLUCOPHAGE-XR) 500 MG ER 24hr tablet TOME JAK TABLETA TODOS LOS SWENSON CON EL DESAYUNO 90 tablet 2    omeprazole (PRILOSEC) 20 MG capsule Take 1 capsule (20 mg total) by mouth once  "daily. Para el reflujo. 90 capsule 3    blood sugar diagnostic Strp To check BG 2 times daily, to use with insurance preferred meter (Patient not taking: Reported on 12/19/2024) 100 strip 11    blood-glucose meter kit To check BG 2 times daily, to use with insurance preferred meter (Patient not taking: Reported on 12/19/2024) 1 each 5    losartan (COZAAR) 25 MG tablet Take 0.5 tablets (12.5 mg total) by mouth once daily. 30 tablet 3    OXcarbazepine (TRILEPTAL) 300 MG Tab TOME 1 TABLETA DOS VECES AL GRANT 180 tablet 2    tamsulosin (FLOMAX) 0.4 mg Cap Take 1 capsule (0.4 mg total) by mouth once daily. (Patient not taking: Reported on 12/19/2024) 90 capsule 3     No current facility-administered medications for this visit.       Review of patient's allergies indicates:  No Known Allergies      Objective:       Last 3 sets of Vitals        6/20/2024     2:02 PM 9/19/2024     2:05 PM 12/19/2024     2:11 PM   Vitals - 1 value per visit   SYSTOLIC 128 146 168   DIASTOLIC 72 82 82   Pulse 72 82 83   SPO2 95 % 95 % 96 %   Weight (lb) 178.79 177.03 180.78   Weight (kg) 81.1 80.3 82   Height 5' 6" (1.676 m) 5' 6" (1.676 m) 5' 6" (1.676 m)   BMI (Calculated) 28.9 28.6 29.2   Pain Score Zero Six Zero   Physical Exam  Constitutional:       General: He is not in acute distress.     Appearance: Normal appearance.   HENT:      Head: Normocephalic.      Right Ear: Tympanic membrane, ear canal and external ear normal.      Left Ear: Tympanic membrane, ear canal and external ear normal.      Nose: Nose normal.      Mouth/Throat:      Mouth: Mucous membranes are moist.   Eyes:      General: No scleral icterus.     Extraocular Movements: Extraocular movements intact.      Conjunctiva/sclera: Conjunctivae normal.   Neck:      Vascular: No carotid bruit.      Comments: No goiter.  Cardiovascular:      Rate and Rhythm: Normal rate and regular rhythm.      Pulses: Normal pulses.      Heart sounds: Normal heart sounds.   Pulmonary:      " Effort: Pulmonary effort is normal.      Breath sounds: Normal breath sounds.   Abdominal:      General: Bowel sounds are normal. There is no distension.      Palpations: Abdomen is soft. There is no mass.      Tenderness: There is no abdominal tenderness.   Musculoskeletal:         General: No swelling. Normal range of motion.   Lymphadenopathy:      Cervical: No cervical adenopathy.   Skin:     General: Skin is warm and dry.   Neurological:      General: No focal deficit present.      Mental Status: He is alert and oriented to person, place, and time.   Psychiatric:         Mood and Affect: Mood normal.         Behavior: Behavior normal.           CBC:  Recent Labs   Lab 05/12/23  1620 03/22/24  0824 12/13/24  0829   WBC 6.66 7.94 7.40   RBC 5.05 5.06 4.88   Hemoglobin 14.0 13.8 L 13.6 L   Hematocrit 42.2 41.3 41.1   Platelets 220 254 229   MCV 84 82 84   MCH 27.7 27.3 27.9   MCHC 33.2 33.4 33.1     CMP:  Recent Labs   Lab 03/22/24  0824 06/14/24  0815 12/13/24  0829   Glucose 140 H 133 H 135 H   Calcium 9.4 9.3 9.5   Albumin 3.8 3.7 4.0   Total Protein 7.6 7.4 7.5   Sodium 134 L 144 136   Potassium 4.2 3.9 4.1   CO2 24 23 24   Chloride 98 110 100   BUN 12 21 12   Creatinine 1.0 1.1 1.0   Alkaline Phosphatase 105 99 102   ALT 12 10 12   AST 13 12 16   Total Bilirubin 0.3 0.3 0.3     URINALYSIS:  Recent Labs   Lab 01/28/22  0035 02/11/22  1221   Color, UA Yellow Yellow   Specific Gravity, UA >1.030 A 1.030   pH, UA 7.0 6.0   Protein, UA 2+ A 1+ A   Bacteria None Rare   Nitrite, UA Negative Negative   Leukocytes, UA Negative Negative   Urobilinogen, UA Negative 2.0-3.0 A   Hyaline Casts, UA 0 1      LIPIDS:  Recent Labs   Lab 04/21/22  1432 10/18/22  0822 05/12/23  1620 03/22/24  0824 06/14/24  0815   TSH 1.704  --  1.498 3.727  --    HDL 48   < > 49 51 47   Cholesterol 224 H   < > 205 H 208 H 130   Triglycerides 128   < > 119 91 67   LDL Cholesterol 150.4   < > 132.2 138.8 69.6   HDL/Cholesterol Ratio 21.4   < >  23.9 24.5 36.2   Non-HDL Cholesterol 176   < > 156 157 83   Total Cholesterol/HDL Ratio 4.7   < > 4.2 4.1 2.8    < > = values in this interval not displayed.     TSH:  Recent Labs   Lab 04/21/22  1432 05/12/23  1620 03/22/24  0824   TSH 1.704 1.498 3.727       A1C:  Recent Labs   Lab 02/11/22  1255 04/21/22  1432 10/18/22  0822 05/12/23  1620 03/22/24  0824 06/14/24  0815 12/13/24  0829   Hemoglobin A1C 6.9 H 6.6 H 6.2 H 6.2 H 6.6 H 6.6 H 6.7 H       Imaging:  X-Ray Lumbar Spine AP And Lateral  Narrative: EXAMINATION:  XR LUMBAR SPINE AP AND LATERAL    CLINICAL HISTORY:  Low back pain, unspecified    TECHNIQUE:  AP, lateral and spot images were performed of the lumbar spine.    COMPARISON:  Lumbar spine, 06/01/2006    FINDINGS:  Bony structures appear in tact with generalized spondylosis with marginal osteophytes and disc space narrowing throughout.  Alignment is maintained.  Pedicles demonstrate no destruction  Impression: Increasing spondylosis throughout lumbar spine with no evidence of acute fracture.    Electronically signed by: Lázaro Barber  Date:    02/24/2022  Time:    10:09      Assessment:       1. Primary hypertension    2. Type 2 diabetes mellitus with hyperglycemia, without long-term current use of insulin    3. Hyperlipidemia, unspecified hyperlipidemia type    4. Left-sided trigeminal neuralgia    5. Vertigo    6. Needs flu shot    7. Healthcare maintenance            Plan:       1. Primary hypertension  Overview:  Starting losartan 12.5 mg daily    Assessment & Plan:  - Diagnosed hypertension based on 2 consecutive visits with elevated blood pressure  - Initiated antihypertensive medication to protect kidney and heart, given patient's diabetes  - Started losartan at half a pill daily, with flexibility to take in the morning or at night.  - Checked blood pressure in office.  - Contact the office if experiencing side effects from new blood pressure medication.    Orders:  -     losartan (COZAAR) 25  MG tablet; Take 0.5 tablets (12.5 mg total) by mouth once daily.  Dispense: 30 tablet; Refill: 3  -     BASIC METABOLIC PANEL; Future; Expected date: 12/19/2024    2. Type 2 diabetes mellitus with hyperglycemia, without long-term current use of insulin  Overview:  On metformin daily    Assessment & Plan:  - Considered patient's well-controlled diabetes (A1C 6.5) and current medication regimen  - Educated on the importance of blood pressure control for patients with diabetes.  - Provided information on the American Diabetes Association's Food Hub website for meal planning and recipes.  - Discussed the importance of foot care for diabetic patients.  - Norman to continue current diet and exercise routine.  - Recommend aiming for 150 minutes of exercise per week, or at least 5 minutes daily if unable to reach 150 minutes.  - Continued metformin for diabetes management.      Orders:  -     Diabetes Digital Medicine (DDMP) Enrollment Order  -     losartan (COZAAR) 25 MG tablet; Take 0.5 tablets (12.5 mg total) by mouth once daily.  Dispense: 30 tablet; Refill: 3  -     BASIC METABOLIC PANEL; Future; Expected date: 12/19/2024    3. Hyperlipidemia, unspecified hyperlipidemia type  Overview:  Tolerating atorvastatin atorvastatin 20 mg daily    Assessment & Plan:  - Last LDL less than 70 less than 70 mg/dL, at goal.  - Encuraged to exercise regularly.        4. Left-sided trigeminal neuralgia  Overview:  On Oxcarmazepine 300mg twice a day.      Orders:  -     OXcarbazepine (TRILEPTAL) 300 MG Tab; TOME 1 TABLETA DOS VECES AL GRANT  Dispense: 180 tablet; Refill: 2    5. Vertigo  Overview:  On meclizine twice a day, will try to decrease it to once a day.  With the afraid to use it as needed.    Assessment & Plan:  Reports occasional dizziness.      Encourage hydration   Decreased meclizine to as-needed basis for dizziness, hold if symptoms improve.      6. Needs flu shot  -     influenza (adjuvanted) (Fluad) 45 mcg/0.5 mL IM  vaccine (> or = 66 yo) 0.5 mL    7. Healthcare maintenance  Assessment & Plan:  - Yearly labs- 3/22/24  - Colon cancer screening- no longer indicated  - ACP-completed HPOA 6/20/24.  - Eye exam- last on 2022, referral placed.  Has appointment in January.  - Foot exam- done on 04/10/24.  - Vaccination- due for Flu, Tdap, shingles, RSV, COVID booster.        FOLLOW-UP:  - Follow up with nurse in 2 weeks for blood pressure check.  - Follow up in 6 weeks to reassess blood pressure and overall health status.        Health Maintenance Due   Topic Date Due    TETANUS VACCINE  Never done    RSV Vaccine (Age 60+ and Pregnant patients) (1 - 1-dose 75+ series) Never done    Shingles Vaccine (2 of 2) 09/16/2022    Eye Exam  09/29/2023    COVID-19 Vaccine (4 - 2024-25 season) 09/01/2024        I spent a total of 40 minutes on the day of the visit.This includes face to face time and non-face to face time preparing to see the patient (eg, review of tests), obtaining and/or reviewing separately obtained history, documenting clinical information in the electronic or other health record, independently interpreting results and communicating results to the patient/family/caregiver, or care coordinator.     RETURN TO CLINIC IN:  2 weeks for BP check and 6 weeks to follow-up treatment    FOR NEXT VISIT: MEDICATION MONITORING       Darshana Brandon MD  [facility]  Disclaimer:  This note has been generated using voice-recognition software. There may be grammatical or spelling errors that have been missed during proof-reading

## 2024-12-22 NOTE — ASSESSMENT & PLAN NOTE
- Diagnosed hypertension based on 2 consecutive visits with elevated blood pressure  - Initiated antihypertensive medication to protect kidney and heart, given patient's diabetes  - Started losartan at half a pill daily, with flexibility to take in the morning or at night.  - Checked blood pressure in office.  - Contact the office if experiencing side effects from new blood pressure medication.

## 2024-12-22 NOTE — ASSESSMENT & PLAN NOTE
Reports occasional dizziness.      Encourage hydration   Decreased meclizine to as-needed basis for dizziness, hold if symptoms improve.

## 2024-12-22 NOTE — ASSESSMENT & PLAN NOTE
- Yearly labs- 3/22/24  - Colon cancer screening- no longer indicated  - ACP-completed HPOA 6/20/24.  - Eye exam- last on 2022, referral placed.  Has appointment in January.  - Foot exam- done on 04/10/24.  - Vaccination- due for Flu, Tdap, shingles, RSV, COVID booster.

## 2024-12-22 NOTE — ASSESSMENT & PLAN NOTE
- Considered patient's well-controlled diabetes (A1C 6.5) and current medication regimen  - Educated on the importance of blood pressure control for patients with diabetes.  - Provided information on the American Diabetes Association's Food Hub website for meal planning and recipes.  - Discussed the importance of foot care for diabetic patients.  - Norman to continue current diet and exercise routine.  - Recommend aiming for 150 minutes of exercise per week, or at least 5 minutes daily if unable to reach 150 minutes.  - Continued metformin for diabetes management.

## 2025-01-07 ENCOUNTER — TELEPHONE (OUTPATIENT)
Dept: PRIMARY CARE CLINIC | Facility: CLINIC | Age: OVER 89
End: 2025-01-07

## 2025-01-07 ENCOUNTER — CLINICAL SUPPORT (OUTPATIENT)
Dept: PRIMARY CARE CLINIC | Facility: CLINIC | Age: OVER 89
End: 2025-01-07
Payer: MEDICARE

## 2025-01-07 DIAGNOSIS — E11.65 TYPE 2 DIABETES MELLITUS WITH HYPERGLYCEMIA, WITHOUT LONG-TERM CURRENT USE OF INSULIN: ICD-10-CM

## 2025-01-07 DIAGNOSIS — I10 PRIMARY HYPERTENSION: Primary | ICD-10-CM

## 2025-01-07 DIAGNOSIS — I10 PRIMARY HYPERTENSION: ICD-10-CM

## 2025-01-07 DIAGNOSIS — R05.9 COUGH, UNSPECIFIED TYPE: Primary | ICD-10-CM

## 2025-01-07 RX ORDER — GUAIFENESIN AND DEXTROMETHORPHAN HYDROBROMIDE 1200; 60 MG/1; MG/1
1 TABLET, EXTENDED RELEASE ORAL 2 TIMES DAILY PRN
Qty: 20 TABLET | Refills: 0 | Status: SHIPPED | OUTPATIENT
Start: 2025-01-07 | End: 2025-01-17

## 2025-01-07 RX ORDER — BENZONATATE 200 MG/1
200 CAPSULE ORAL 3 TIMES DAILY PRN
Qty: 30 CAPSULE | Refills: 1 | Status: SHIPPED | OUTPATIENT
Start: 2025-01-07

## 2025-01-07 RX ORDER — LOSARTAN POTASSIUM 25 MG/1
25 TABLET ORAL DAILY
Start: 2025-01-07 | End: 2026-01-07

## 2025-01-07 NOTE — PROGRESS NOTES
Patient here for b/p check for Dr Brandon. Pt currently taking Losartan 12.5 mg ( 1/2 pill 25 mg ) every AM    YEVGENIY Velasco here in nurse visit for [place] interpretation    Pt states he took 12.5 mg of Losartan today at 9am.   Last saw Dr Brandon on 12/18   Manual Blood Pressure at visit is 146/68.     Per patient , he states he has developed a cough that has gotten worse since last seeing Dr Brandon.   Patient is agreeable to coming back in this week for eval for cough and blood pressure medication adjustment.   Pt scheduled to see TEO Jerry on Thursday 1/9.                impairments found

## 2025-01-08 NOTE — TELEPHONE ENCOUNTER
----- Message from Med Assistant Velasco sent at 1/7/2025 11:17 AM CST -----  Pt. Came in to office today and complained about a cough, he requested a cough medication

## 2025-01-09 ENCOUNTER — OFFICE VISIT (OUTPATIENT)
Dept: PRIMARY CARE CLINIC | Facility: CLINIC | Age: OVER 89
End: 2025-01-09
Payer: MEDICARE

## 2025-01-09 VITALS
WEIGHT: 181.56 LBS | BODY MASS INDEX: 29.3 KG/M2 | OXYGEN SATURATION: 96 % | DIASTOLIC BLOOD PRESSURE: 68 MMHG | HEART RATE: 73 BPM | SYSTOLIC BLOOD PRESSURE: 122 MMHG

## 2025-01-09 DIAGNOSIS — E11.65 TYPE 2 DIABETES MELLITUS WITH HYPERGLYCEMIA, WITHOUT LONG-TERM CURRENT USE OF INSULIN: ICD-10-CM

## 2025-01-09 DIAGNOSIS — I10 PRIMARY HYPERTENSION: Primary | ICD-10-CM

## 2025-01-09 PROCEDURE — 99999 PR PBB SHADOW E&M-EST. PATIENT-LVL III: CPT | Mod: PBBFAC,HCNC,, | Performed by: PHYSICIAN ASSISTANT

## 2025-01-09 NOTE — PROGRESS NOTES
"  Primary Care Provider Appointment   [facility]        Subjective:       Patient ID:  Norman is a 89 y.o. male being seen for Hypertension and Cough      Chief Complaint: Hypertension and Cough    Interpretor Lázaro # [id number]    HPI: 89 yo male presents for cough and HTN. Cough was occurring for about 1 week. Improving. Denies SOB, chest pain, fever.   HTN: pcp added losartan 12.5 mg to his regimen. He has been taking it.  His BP is better controlled today. Denies CP, SOB, HA.     Past Medical History:   Diagnosis Date    Cataract     COVID-19 01/27/2022    GERD (gastroesophageal reflux disease)     Primary hypertension 12/22/2024    Trigeminal neuralgia     Type 2 diabetes mellitus with hyperglycemia, without long-term current use of insulin 5/26/2021       Review of Systems   Constitutional:  Negative for fever.   Respiratory:  Positive for cough (improved). Negative for shortness of breath.    Cardiovascular:  Negative for chest pain and leg swelling.   Neurological:  Negative for headaches.             Health Maintenance         Date Due Completion Date    TETANUS VACCINE Never done ---    RSV Vaccine (Age 60+ and Pregnant patients) (1 - 1-dose 75+ series) Never done ---    Shingles Vaccine (2 of 2) 09/16/2022 7/22/2022    Eye Exam 09/29/2023 9/29/2022    COVID-19 Vaccine (4 - 2024-25 season) 09/01/2024 5/18/2022    Hemoglobin A1c 06/13/2025 12/13/2024    Diabetes Urine Screening 06/14/2025 6/14/2024    Lipid Panel 06/14/2025 6/14/2024                     Objective:      Vitals:    01/09/25 1459   BP: 122/68   BP Location: Right arm   Patient Position: Sitting   Pulse: 73   SpO2: 96%   Weight: 82.3 kg (181 lb 8.8 oz)     Estimated body mass index is 29.3 kg/m² as calculated from the following:    Height as of 12/19/24: 5' 6" (1.676 m).    Weight as of this encounter: 82.3 kg (181 lb 8.8 oz).  Physical Exam  Constitutional:       Appearance: Normal appearance.   HENT:      " Head: Normocephalic and atraumatic.   Cardiovascular:      Rate and Rhythm: Normal rate and regular rhythm.   Pulmonary:      Effort: No respiratory distress.      Breath sounds: Normal breath sounds.   Neurological:      General: No focal deficit present.      Mental Status: He is alert and oriented to person, place, and time.   Psychiatric:         Mood and Affect: Mood normal.         Thought Content: Thought content normal.           Assessment and Plan:         1. Primary hypertension  Overview:  Starting losartan 12.5 mg daily    Assessment & Plan:  Continue losartan 12.5 mg daily, BP controlled today  F/u in 1 month with labs prior      2. Type 2 diabetes mellitus with hyperglycemia, without long-term current use of insulin  Overview:  On metformin daily    Assessment & Plan:  Last hga1c 6.7  Continue metformin   Diabetic diet.            Follow Up:   F/u in 1 month        Amy Lado, PA-C [facility]

## 2025-01-14 DIAGNOSIS — Z00.00 ENCOUNTER FOR MEDICARE ANNUAL WELLNESS EXAM: ICD-10-CM

## 2025-02-05 ENCOUNTER — OFFICE VISIT (OUTPATIENT)
Dept: PRIMARY CARE CLINIC | Facility: CLINIC | Age: OVER 89
End: 2025-02-05
Payer: MEDICARE

## 2025-02-05 VITALS
BODY MASS INDEX: 28.97 KG/M2 | DIASTOLIC BLOOD PRESSURE: 82 MMHG | SYSTOLIC BLOOD PRESSURE: 124 MMHG | WEIGHT: 180.25 LBS | HEIGHT: 66 IN | HEART RATE: 65 BPM | OXYGEN SATURATION: 96 %

## 2025-02-05 DIAGNOSIS — I10 PRIMARY HYPERTENSION: Primary | ICD-10-CM

## 2025-02-05 DIAGNOSIS — E78.5 HYPERLIPIDEMIA, UNSPECIFIED HYPERLIPIDEMIA TYPE: ICD-10-CM

## 2025-02-05 DIAGNOSIS — R42 VERTIGO: ICD-10-CM

## 2025-02-05 DIAGNOSIS — E11.65 TYPE 2 DIABETES MELLITUS WITH HYPERGLYCEMIA, WITHOUT LONG-TERM CURRENT USE OF INSULIN: ICD-10-CM

## 2025-02-05 DIAGNOSIS — J30.9 ALLERGIC SINUSITIS: ICD-10-CM

## 2025-02-05 DIAGNOSIS — G50.0 LEFT-SIDED TRIGEMINAL NEURALGIA: ICD-10-CM

## 2025-02-05 PROCEDURE — 1159F MED LIST DOCD IN RCRD: CPT | Mod: HCNC,CPTII,S$GLB, | Performed by: INTERNAL MEDICINE

## 2025-02-05 PROCEDURE — 99999 PR PBB SHADOW E&M-EST. PATIENT-LVL V: CPT | Mod: PBBFAC,HCNC,, | Performed by: INTERNAL MEDICINE

## 2025-02-05 PROCEDURE — 3288F FALL RISK ASSESSMENT DOCD: CPT | Mod: HCNC,CPTII,S$GLB, | Performed by: INTERNAL MEDICINE

## 2025-02-05 PROCEDURE — 1160F RVW MEDS BY RX/DR IN RCRD: CPT | Mod: HCNC,CPTII,S$GLB, | Performed by: INTERNAL MEDICINE

## 2025-02-05 PROCEDURE — 1126F AMNT PAIN NOTED NONE PRSNT: CPT | Mod: HCNC,CPTII,S$GLB, | Performed by: INTERNAL MEDICINE

## 2025-02-05 PROCEDURE — 99214 OFFICE O/P EST MOD 30 MIN: CPT | Mod: HCNC,S$GLB,, | Performed by: INTERNAL MEDICINE

## 2025-02-05 PROCEDURE — 1101F PT FALLS ASSESS-DOCD LE1/YR: CPT | Mod: HCNC,CPTII,S$GLB, | Performed by: INTERNAL MEDICINE

## 2025-02-05 RX ORDER — MECLIZINE HCL 12.5 MG 12.5 MG/1
12.5 TABLET ORAL 3 TIMES DAILY PRN
Qty: 60 TABLET | Refills: 2 | Status: SHIPPED | OUTPATIENT
Start: 2025-02-05

## 2025-02-05 RX ORDER — LOSARTAN POTASSIUM 25 MG/1
25 TABLET ORAL DAILY
Qty: 90 TABLET | Refills: 3 | Status: SHIPPED | OUTPATIENT
Start: 2025-02-05 | End: 2026-02-05

## 2025-02-05 RX ORDER — CETIRIZINE HYDROCHLORIDE 10 MG/1
10 TABLET ORAL DAILY PRN
Qty: 30 TABLET | Refills: 2 | Status: SHIPPED | OUTPATIENT
Start: 2025-02-05 | End: 2026-02-05

## 2025-02-05 NOTE — PROGRESS NOTES
Subjective:       Patient ID: Norman Saunders is a 89 y.o. male.    Chief Complaint: Hypertension      HPI  Norman Saunders is a 89 y.o. male with diabetes mellitus type 2, HLD, depression, trigeminal neuralgia, GERD who presents today for Hypertension    Norman presents today for follow up.    Started Losartan 12.5 mg on last visit. He reports history of elevated blood pressure readings at home but is not currently monitoring blood pressure.  Denies headaches, chest pains, palpitations, or weakness.    He reports occasional cough with phlegm originating from the upper respiratory tract rather than the chest. He denies chest pain associated with the cough. Currently using nasal spray and Mucinex for symptom management, though relief is temporary with symptoms recurring shortly after.    He takes dizziness medication as needed rather than daily, and finds relief by lying down during episodes.    He is not checking blood sugar at home and denies symptoms of hypoglycemia such as weakness or sweating.    Has no toxic habits.  Not exercising regularly.  Mood is okay.    He has an upcoming eye exam scheduled for Lat[facility] 3.      ROS:  General: -fever, -chills, -fatigue, -weight gain, -weight loss  Eyes: -vision changes, -redness, -discharge, -eye pain  ENT: -ear pain, -nasal congestion, -sore throat  Cardiovascular: -chest pain, -palpitations, -lower extremity edema  Respiratory: +cough, -shortness of breath  Gastrointestinal: -abdominal pain, -nausea, -vomiting, -diarrhea, -constipation, -blood in stool  Genitourinary: -dysuria, -hematuria, -frequency  Musculoskeletal: -joint pain, -muscle pain  Skin: -rash, -lesion  Neurological: -headache, +dizziness, -numbness, -tingling, -weakness  Psychiatric: -anxiety, -depression, -sleep difficulty            Past Medical History:   Diagnosis Date    Cataract     COVID-19 01/27/2022    GERD (gastroesophageal reflux disease)     Primary hypertension 12/22/2024    Trigeminal neuralgia      Type 2 diabetes mellitus with hyperglycemia, without long-term current use of insulin 5/26/2021       Past Surgical History:   Procedure Laterality Date    CATARACT EXTRACTION         Family History   Problem Relation Name Age of Onset    No Known Problems Mother      No Known Problems Father      Amblyopia Neg Hx      Blindness Neg Hx      Cancer Neg Hx      Cataracts Neg Hx      Diabetes Neg Hx      Glaucoma Neg Hx      Hypertension Neg Hx      Macular degeneration Neg Hx      Retinal detachment Neg Hx      Strabismus Neg Hx      Stroke Neg Hx      Thyroid disease Neg Hx         Social History     Socioeconomic History    Marital status:    Tobacco Use    Smoking status: Never    Smokeless tobacco: Never   Substance and Sexual Activity    Alcohol use: No    Drug use: No       Current Outpatient Medications   Medication Sig Dispense Refill    acetic acid (VOSOL) 2 % otic solution Place 4 drops into both ears 3 (three) times daily as needed (itching). 6 mL 1    atorvastatin (LIPITOR) 20 MG tablet Take 1 tablet (20 mg total) by mouth once daily. 90 tablet 3    benzonatate (TESSALON) 200 MG capsule Take 1 capsule (200 mg total) by mouth 3 (three) times daily as needed for Cough. 30 capsule 1    blood sugar diagnostic Strp To check BG 2 times daily, to use with insurance preferred meter 100 strip 11    fluocinolone acetonide oiL (DERMOTIC OIL) 0.01 % Drop Place 3 drops in ear(s) 2 (two) times daily. 20 mL 3    fluticasone propionate (FLONASE) 50 mcg/actuation nasal spray 2 sprays (100 mcg total) by Each Nostril route once daily. 16 g 2    lancets Misc To check BG 2 times daily, to use with insurance preferred meter 100 each 11    metFORMIN (GLUCOPHAGE-XR) 500 MG ER 24hr tablet TOME JAK TABLETA TODOS LOS SWENSON CON EL DESAYUNO 90 tablet 2    omeprazole (PRILOSEC) 20 MG capsule Take 1 capsule (20 mg total) by mouth once daily. Para el reflujo. 90 capsule 3    OXcarbazepine (TRILEPTAL) 300 MG Tab TOME 1 TABLETA  "DOS LINDEN AL GRANT 180 tablet 2    tamsulosin (FLOMAX) 0.4 mg Cap Take 1 capsule (0.4 mg total) by mouth once daily. 90 capsule 3    aspirin (ECOTRIN) 81 MG EC tablet Take 1 tablet (81 mg total) by mouth once daily. 30 tablet 11    blood-glucose meter kit To check BG 2 times daily, to use with insurance preferred meter (Patient not taking: Reported on 1/9/2025) 1 each 5    cetirizine (ZYRTEC) 10 MG tablet Take 1 tablet (10 mg total) by mouth daily as needed for Allergies. 30 tablet 2    losartan (COZAAR) 25 MG tablet Take 1 tablet (25 mg total) by mouth once daily. 90 tablet 3    meclizine (ANTIVERT) 12.5 mg tablet Take 1 tablet (12.5 mg total) by mouth 3 (three) times daily as needed for Dizziness. 60 tablet 2     No current facility-administered medications for this visit.       Review of patient's allergies indicates:  No Known Allergies      Objective:       Last 3 sets of Vitals        12/19/2024     2:11 PM 1/9/2025     2:59 PM 2/5/2025     9:17 AM   Vitals - 1 value per visit   SYSTOLIC 168 122 124   DIASTOLIC 82 68 82   Pulse 83 73 65   SPO2 96 % 96 % 96 %   Weight (lb) 180.78 181.55 180.23   Weight (kg) 82 82.35 81.75   Height 5' 6" (1.676 m)  5' 6" (1.676 m)   BMI (Calculated) 29.2  29.1   Pain Score Zero Zero Zero   Physical Exam  Constitutional:       General: He is not in acute distress.     Appearance: Normal appearance.   HENT:      Head: Normocephalic.      Right Ear: Tympanic membrane, ear canal and external ear normal.      Left Ear: Tympanic membrane, ear canal and external ear normal.      Nose: Nose normal.      Mouth/Throat:      Mouth: Mucous membranes are moist.   Eyes:      General: No scleral icterus.     Extraocular Movements: Extraocular movements intact.      Conjunctiva/sclera: Conjunctivae normal.   Neck:      Vascular: No carotid bruit.      Comments: No goiter.  Cardiovascular:      Rate and Rhythm: Normal rate and regular rhythm.      Pulses: Normal pulses.      Heart sounds: Normal " heart sounds.   Pulmonary:      Effort: Pulmonary effort is normal.      Breath sounds: Normal breath sounds.   Abdominal:      General: Bowel sounds are normal. There is no distension.      Palpations: Abdomen is soft. There is no mass.      Tenderness: There is no abdominal tenderness.   Musculoskeletal:         General: No swelling. Normal range of motion.   Lymphadenopathy:      Cervical: No cervical adenopathy.   Skin:     General: Skin is warm and dry.   Neurological:      General: No focal deficit present.      Mental Status: He is alert and oriented to person, place, and time.   Psychiatric:         Mood and Affect: Mood normal.         Behavior: Behavior normal.           CBC:  Recent Labs   Lab 05/12/23  1620 03/22/24  0824 12/13/24  0829   WBC 6.66 7.94 7.40   RBC 5.05 5.06 4.88   Hemoglobin 14.0 13.8 L 13.6 L   Hematocrit 42.2 41.3 41.1   Platelets 220 254 229   MCV 84 82 84   MCH 27.7 27.3 27.9   MCHC 33.2 33.4 33.1     CMP:  Recent Labs   Lab 03/22/24  0824 06/14/24  0815 12/13/24  0829   Glucose 140 H 133 H 135 H   Calcium 9.4 9.3 9.5   Albumin 3.8 3.7 4.0   Total Protein 7.6 7.4 7.5   Sodium 134 L 144 136   Potassium 4.2 3.9 4.1   CO2 24 23 24   Chloride 98 110 100   BUN 12 21 12   Creatinine 1.0 1.1 1.0   Alkaline Phosphatase 105 99 102   ALT 12 10 12   AST 13 12 16   Total Bilirubin 0.3 0.3 0.3     URINALYSIS:  Recent Labs   Lab 02/11/22  1221   Color, UA Yellow   Specific Gravity, UA 1.030   pH, UA 6.0   Protein, UA 1+ A   Bacteria Rare   Nitrite, UA Negative   Leukocytes, UA Negative   Urobilinogen, UA 2.0-3.0 A   Hyaline Casts, UA 1      LIPIDS:  Recent Labs   Lab 04/21/22  1432 10/18/22  0822 05/12/23  1620 03/22/24  0824 06/14/24  0815   TSH 1.704  --  1.498 3.727  --    HDL 48   < > 49 51 47   Cholesterol 224 H   < > 205 H 208 H 130   Triglycerides 128   < > 119 91 67   LDL Cholesterol 150.4   < > 132.2 138.8 69.6   HDL/Cholesterol Ratio 21.4   < > 23.9 24.5 36.2   Non-HDL Cholesterol 176   <  > 156 157 83   Total Cholesterol/HDL Ratio 4.7   < > 4.2 4.1 2.8    < > = values in this interval not displayed.     TSH:  Recent Labs   Lab 04/21/22  1432 05/12/23  1620 03/22/24  0824   TSH 1.704 1.498 3.727       A1C:  Recent Labs   Lab 02/11/22  1255 04/21/22  1432 10/18/22  0822 05/12/23  1620 03/22/24  0824 06/14/24  0815 12/13/24  0829   Hemoglobin A1C 6.9 H 6.6 H 6.2 H 6.2 H 6.6 H 6.6 H 6.7 H       Imaging:  X-Ray Lumbar Spine AP And Lateral  Narrative: EXAMINATION:  XR LUMBAR SPINE AP AND LATERAL    CLINICAL HISTORY:  Low back pain, unspecified    TECHNIQUE:  AP, lateral and spot images were performed of the lumbar spine.    COMPARISON:  Lumbar spine, 06/01/2006    FINDINGS:  Bony structures appear in tact with generalized spondylosis with marginal osteophytes and disc space narrowing throughout.  Alignment is maintained.  Pedicles demonstrate no destruction  Impression: Increasing spondylosis throughout lumbar spine with no evidence of acute fracture.    Electronically signed by: Lázaro Barber  Date:    02/24/2022  Time:    10:09      Assessment:       1. Primary hypertension    2. Type 2 diabetes mellitus with hyperglycemia, without long-term current use of insulin    3. Hyperlipidemia, unspecified hyperlipidemia type    4. Allergic sinusitis    5. Left-sided trigeminal neuralgia    6. Vertigo            Plan:       1. Primary hypertension  Overview:  Starting losartan 12.5 mg daily    Orders:  -     Hypertension Digital Medicine (HDMP) Enrollment Order  -     losartan (COZAAR) 25 MG tablet; Take 1 tablet (25 mg total) by mouth once daily.  Dispense: 90 tablet; Refill: 3    2. Type 2 diabetes mellitus with hyperglycemia, without long-term current use of insulin  Overview:  On metformin daily    Orders:  -     Diabetes Digital Medicine (DDMP) Enrollment Order  -     losartan (COZAAR) 25 MG tablet; Take 1 tablet (25 mg total) by mouth once daily.  Dispense: 90 tablet; Refill: 3  -     Comprehensive  Metabolic Panel; Future; Expected date: 02/05/2025  -     Hemoglobin A1C; Future; Expected date: 02/05/2025  -     Microalbumin/Creatinine Ratio, Urine; Future; Expected date: 02/05/2025  -     Ambulatory referral/consult to Optometry; Future; Expected date: 02/05/2025    3. Hyperlipidemia, unspecified hyperlipidemia type  Overview:  Tolerating atorvastatin atorvastatin 20 mg daily    Orders:  -     Lipid Panel; Future; Expected date: 02/05/2025    4. Allergic sinusitis  -     cetirizine (ZYRTEC) 10 MG tablet; Take 1 tablet (10 mg total) by mouth daily as needed for Allergies.  Dispense: 30 tablet; Refill: 2    5. Left-sided trigeminal neuralgia  Overview:  On Oxcarmazepine 300mg twice a day.        6. Vertigo  Overview:  On meclizine twice a day, will try to decrease it to once a day.  With the afraid to use it as needed.    Orders:  -     meclizine (ANTIVERT) 12.5 mg tablet; Take 1 tablet (12.5 mg total) by mouth 3 (three) times daily as needed for Dizziness.  Dispense: 60 tablet; Refill: 2       Assessment & Plan    > Assessed cough with phlegm, nasal congestion, and eye itching as likely due to allergic sinusitis  > Evaluated blood pressure management, noting improvement since last visit but still requiring monitoring  > Assessed diabetes management, noting slightly elevated fasting blood sugar  > Evaluated dizziness in relation to blood pressure management  > Noted elongated soft palate and uvula as potential contributors to phlegm discomfort    HYPERTENSION:  - Explained Digital Medicine program for blood pressure monitoring, including smartphone renee usage and benefits.  - Advised the patient to enroll in this program.  - Continued Losartan for blood pressure management.  - Noted improvement in the patient's blood pressure compared to the last visit and previous home measurements.  - Recommend monitoring blood pressure at home, especially when experiencing dizziness upon standing.  - Emphasized the importance  of maintaining proper hydration.    DIABETES:  - Educated the patient on signs of hypoglycemia (weakness, diaphoresis) and its relevance to diabetes management.  - Instructed the patient to monitor for these signs.  - Recommend occasional glucose checks at home.  - Noted slightly elevated fasting glucose (HbA1c) of approximately 7% from December lab results.  - Discussed enrolling the patient in a digital medicine program for diabetes monitoring.  - Recommend regular exercise to manage glucose levels.  - Referred the patient for a vision check to assess for diabetes-related eye issues.  - Emphasized the importance of regular ophthalmological exams for diabetic patients.  - Instructed the patient to bring vision check report to next appointment if done outside of [facility] system.    ALLERGIC SINUSITIS:  - Noted patient's report of nasal congestion and phlegm originating from the upper respiratory tract.  - Continued current treatment with nasal spray.  - Prescribed Zyrtec, an antihistamine, to help manage allergy symptoms.  - Noted patient's report of occasional cough with phlegm originating from the upper respiratory tract.  - Continued current treatment with an expectorant (Mucinex) for temporary relief of cough.  - Noted patient's report of itchy eyes, possibly related to environmental factors.  - Acknowledged the difficulty in completely controlling the condition with medications due to environmental factors.    DIZZINESS:  - Continued Meclizine for dizziness, to be taken as needed.  - Instructed the patient to contact the office if experiencing dizziness, especially upon standing.  - Recommend monitoring blood pressure and glucose levels during dizzy spells.  - Advised maintaining proper hydration to help manage dizziness.      GENERAL HEALTH RECOMMENDATIONS:  - Emphasized the importance of regular exercise for managing glucose levels and overall health.  - Recommend maintaining proper hydration and engaging in  regular physical activity.  - Discussed importance of staying hydrated, especially given medication regimen.  - Norman to increase water intake.  - Discussed the importance of regular exercise for overall health and diabetes management.  - Recommend engaging in regular exercise, even if minimal (e.g., standing up from chair 10 times).    SHINGLES VACCINATION:  - Explained herpes zoster and its relation to childhood varicella, emphasizing vaccine availability.    VACCINATIONS:  - Vaccinations ordered to reduce risk of infections like pneumonia and herpes zoster.    FOLLOW UP:  - Follow up in 3 months.        Health Maintenance Due   Topic Date Due    TETANUS VACCINE  Never done    RSV Vaccine (Age 60+ and Pregnant patients) (1 - 1-dose 75+ series) Never done    Shingles Vaccine (2 of 2) 09/16/2022    Diabetic Eye Exam  09/29/2023    COVID-19 Vaccine (4 - 2024-25 season) 09/01/2024        This includes face to face time and non-face to face time preparing to see the patient (eg, review of tests), obtaining and/or reviewing separately obtained history, documenting clinical information in the electronic or other health record, independently interpreting results and communicating results to the patient/family/caregiver, or care coordinator.     RETURN TO CLINIC IN: 3 MONTHS    FOR NEXT VISIT: BLOOD PRESSURE MONITORING, DIABETES MONITORING, REVIEW LABS, and MEDICATION MONITORING       Darshana Brandon MD  [facility]  Disclaimer:  This note has been generated using voice-recognition software. There may be grammatical or spelling errors that have been missed during proof-reading

## 2025-04-29 ENCOUNTER — PATIENT MESSAGE (OUTPATIENT)
Dept: PRIMARY CARE CLINIC | Facility: CLINIC | Age: OVER 89
End: 2025-04-29
Payer: MEDICARE

## 2025-04-29 DIAGNOSIS — M54.9 BACK PAIN, UNSPECIFIED BACK LOCATION, UNSPECIFIED BACK PAIN LATERALITY, UNSPECIFIED CHRONICITY: Primary | ICD-10-CM

## 2025-04-29 NOTE — TELEPHONE ENCOUNTER
RN added a urinalysis just to r/o UTI as cause of back pain.  RN also recommended going to have labs done this week.

## 2025-05-02 ENCOUNTER — LAB VISIT (OUTPATIENT)
Dept: LAB | Facility: HOSPITAL | Age: OVER 89
End: 2025-05-02
Attending: INTERNAL MEDICINE
Payer: MEDICARE

## 2025-05-02 DIAGNOSIS — M54.9 BACK PAIN, UNSPECIFIED BACK LOCATION, UNSPECIFIED BACK PAIN LATERALITY, UNSPECIFIED CHRONICITY: ICD-10-CM

## 2025-05-02 DIAGNOSIS — E78.5 HYPERLIPIDEMIA, UNSPECIFIED HYPERLIPIDEMIA TYPE: ICD-10-CM

## 2025-05-02 DIAGNOSIS — E11.65 TYPE 2 DIABETES MELLITUS WITH HYPERGLYCEMIA, WITHOUT LONG-TERM CURRENT USE OF INSULIN: ICD-10-CM

## 2025-05-02 LAB
ALBUMIN SERPL BCP-MCNC: 3.6 G/DL (ref 3.5–5.2)
ALBUMIN/CREAT UR: 8.8 UG/MG
ALP SERPL-CCNC: 94 UNIT/L (ref 40–150)
ALT SERPL W/O P-5'-P-CCNC: 13 UNIT/L (ref 10–44)
ANION GAP (OHS): 10 MMOL/L (ref 8–16)
AST SERPL-CCNC: 11 UNIT/L (ref 11–45)
BILIRUB SERPL-MCNC: 0.3 MG/DL (ref 0.1–1)
BILIRUB UR QL STRIP.AUTO: NEGATIVE
BUN SERPL-MCNC: 16 MG/DL (ref 10–30)
CALCIUM SERPL-MCNC: 8.6 MG/DL (ref 8.7–10.5)
CHLORIDE SERPL-SCNC: 108 MMOL/L (ref 95–110)
CHOLEST SERPL-MCNC: 124 MG/DL (ref 120–199)
CHOLEST/HDLC SERPL: 3 {RATIO} (ref 2–5)
CLARITY UR: CLEAR
CO2 SERPL-SCNC: 20 MMOL/L (ref 23–29)
COLOR UR AUTO: YELLOW
CREAT SERPL-MCNC: 1 MG/DL (ref 0.5–1.4)
CREAT UR-MCNC: 147 MG/DL (ref 23–375)
EAG (OHS): 143 MG/DL (ref 68–131)
GFR SERPLBLD CREATININE-BSD FMLA CKD-EPI: >60 ML/MIN/1.73/M2
GLUCOSE SERPL-MCNC: 128 MG/DL (ref 70–110)
GLUCOSE UR QL STRIP: NEGATIVE
HBA1C MFR BLD: 6.6 % (ref 4–5.6)
HDLC SERPL-MCNC: 42 MG/DL (ref 40–75)
HDLC SERPL: 33.9 % (ref 20–50)
HGB UR QL STRIP: NEGATIVE
HOLD SPECIMEN: NORMAL
KETONES UR QL STRIP: NEGATIVE
LDLC SERPL CALC-MCNC: 64 MG/DL (ref 63–159)
LEUKOCYTE ESTERASE UR QL STRIP: NEGATIVE
MICROALBUMIN UR-MCNC: 13 UG/ML (ref ?–5000)
NITRITE UR QL STRIP: NEGATIVE
NONHDLC SERPL-MCNC: 82 MG/DL
PH UR STRIP: 5 [PH]
POTASSIUM SERPL-SCNC: 4.1 MMOL/L (ref 3.5–5.1)
PROT SERPL-MCNC: 7.3 GM/DL (ref 6–8.4)
PROT UR QL STRIP: NEGATIVE
SODIUM SERPL-SCNC: 138 MMOL/L (ref 136–145)
SP GR UR STRIP: 1.03
TRIGL SERPL-MCNC: 90 MG/DL (ref 30–150)
UROBILINOGEN UR STRIP-ACNC: NEGATIVE EU/DL

## 2025-05-02 PROCEDURE — 80061 LIPID PANEL: CPT | Mod: HCNC

## 2025-05-02 PROCEDURE — 83036 HEMOGLOBIN GLYCOSYLATED A1C: CPT | Mod: HCNC

## 2025-05-02 PROCEDURE — 82043 UR ALBUMIN QUANTITATIVE: CPT | Mod: HCNC

## 2025-05-02 PROCEDURE — 80053 COMPREHEN METABOLIC PANEL: CPT | Mod: HCNC

## 2025-05-02 PROCEDURE — 81003 URINALYSIS AUTO W/O SCOPE: CPT | Mod: HCNC

## 2025-05-02 PROCEDURE — 36415 COLL VENOUS BLD VENIPUNCTURE: CPT | Mod: HCNC

## 2025-05-05 ENCOUNTER — RESULTS FOLLOW-UP (OUTPATIENT)
Dept: PRIMARY CARE CLINIC | Facility: CLINIC | Age: OVER 89
End: 2025-05-05

## 2025-05-07 ENCOUNTER — OFFICE VISIT (OUTPATIENT)
Dept: PRIMARY CARE CLINIC | Facility: CLINIC | Age: OVER 89
End: 2025-05-07
Payer: MEDICARE

## 2025-05-07 ENCOUNTER — HOSPITAL ENCOUNTER (OUTPATIENT)
Dept: RADIOLOGY | Facility: HOSPITAL | Age: OVER 89
Discharge: HOME | End: 2025-05-07
Attending: INTERNAL MEDICINE
Payer: MEDICARE

## 2025-05-07 ENCOUNTER — PATIENT MESSAGE (OUTPATIENT)
Dept: PRIMARY CARE CLINIC | Facility: CLINIC | Age: OVER 89
End: 2025-05-07
Payer: MEDICARE

## 2025-05-07 VITALS
HEIGHT: 66 IN | OXYGEN SATURATION: 96 % | SYSTOLIC BLOOD PRESSURE: 134 MMHG | HEART RATE: 86 BPM | DIASTOLIC BLOOD PRESSURE: 86 MMHG | BODY MASS INDEX: 27.83 KG/M2 | WEIGHT: 173.19 LBS

## 2025-05-07 DIAGNOSIS — E11.00 TYPE 2 DIABETES MELLITUS WITH HYPEROSMOLARITY WITHOUT COMA, WITHOUT LONG-TERM CURRENT USE OF INSULIN: ICD-10-CM

## 2025-05-07 DIAGNOSIS — H61.20 EXCESSIVE CERUMEN IN EAR CANAL, UNSPECIFIED LATERALITY: ICD-10-CM

## 2025-05-07 DIAGNOSIS — I10 PRIMARY HYPERTENSION: Primary | ICD-10-CM

## 2025-05-07 DIAGNOSIS — Z00.00 HEALTHCARE MAINTENANCE: ICD-10-CM

## 2025-05-07 DIAGNOSIS — M25.511 RIGHT SHOULDER PAIN, UNSPECIFIED CHRONICITY: ICD-10-CM

## 2025-05-07 DIAGNOSIS — E78.5 HYPERLIPIDEMIA, UNSPECIFIED HYPERLIPIDEMIA TYPE: ICD-10-CM

## 2025-05-07 DIAGNOSIS — M54.41 LOW BACK PAIN WITH RIGHT-SIDED SCIATICA, UNSPECIFIED BACK PAIN LATERALITY, UNSPECIFIED CHRONICITY: ICD-10-CM

## 2025-05-07 PROCEDURE — 99999 PR PBB SHADOW E&M-EST. PATIENT-LVL V: CPT | Mod: PBBFAC,HCNC,, | Performed by: INTERNAL MEDICINE

## 2025-05-07 PROCEDURE — 73030 X-RAY EXAM OF SHOULDER: CPT | Mod: TC,HCNC,FY,RT

## 2025-05-07 PROCEDURE — 73030 X-RAY EXAM OF SHOULDER: CPT | Mod: 26,HCNC,RT, | Performed by: RADIOLOGY

## 2025-05-07 RX ORDER — CELECOXIB 100 MG/1
100 CAPSULE ORAL DAILY PRN
Qty: 30 CAPSULE | Refills: 0 | Status: SHIPPED | OUTPATIENT
Start: 2025-05-07

## 2025-05-07 RX ORDER — KETOROLAC TROMETHAMINE 30 MG/ML
30 INJECTION, SOLUTION INTRAMUSCULAR; INTRAVENOUS ONCE
Status: COMPLETED | OUTPATIENT
Start: 2025-05-07 | End: 2025-05-07

## 2025-05-07 RX ADMIN — KETOROLAC TROMETHAMINE 30 MG: 30 INJECTION, SOLUTION INTRAMUSCULAR; INTRAVENOUS at 10:05

## 2025-05-07 NOTE — PROGRESS NOTES
Subjective:       Patient ID: Norman Saunders is a 89 y.o. male.    Chief Complaint: Hypertension, Diabetes, Results, and Medication Management    HPI  Norman Saunders is a 89 y.o. male with diabetes mellitus type 2, HLD, depression, trigeminal neuralgia, GERD who presents today for Hypertension, Diabetes, Results, and Medication Management    Norman presents today for follow up of a fall with shoulder pain.    He reports a fall with his cane approximately two months ago, uncertain if he lost consciousness or hit his head during the incident. He did not seek emergency medical care following the fall.    He reports shoulder pain and is not performing shoulder exercises. He takes Tylenol for pain relief. He also reports lower back discomfort, which he can localize to a specific area. While he denies pain, he notes the discomfort is noticeable during certain activities.    He experiences mild episodes of dizziness lasting approximately 10 seconds in duration.    He reports ear itching that has not improved with previously prescribed ear drops.    4Ms for Medical Decision-Making in Older Adults    Last Completed EAWV:  None    MEDICATIONS:  High Risk Medications:  Total Active Medications: 1  OXcarbazepine Tab - 300 MG    MOBILITY:  Activities of Daily Livin/8/2025    11:15 PM   Activities of Daily Living   Ambulation Independent   Dressing Independent   Transfers Independent   Toileting Continent of bladder   Feeding Independent   Cleaning home/Chores Assistance Required   Taking meds Assistance Required     Fall Risk:      2025     9:00 AM 2025     9:00 AM 2025     2:20 PM   Fall Risk Assessment - Outpatient   Mobility Status Ambulatory Ambulatory w/ assistance Ambulatory   Number of falls 1 0 0   Identified as fall risk False True False     Disability Status:       No data to display              Nutrition Screenin/8/2025    11:16 PM   Nutrition Screening   Has food intake declined over  the past three months due to loss of appetite, digestive problems, chewing or swallowing difficulties? No decrease in food intake   Involuntary weight loss during the last 3 months? No weight loss   Mobility? Goes out   Has the patient suffered psychological stress or acute disease in the past three months? No   Neuropsychological problems? No psychological problems   Body Mass Index (BMI)?  BMI 23 or greater   Screening Score 14   Interpretation Normal nutritional status    Screening Score: 0-7 Malnourished, 8-11 At Risk, 12-14 Normal  Get Up and Go:       No data to display              Whisper Test:       No data to display                    MENTATION:   Has Dementia Dx: No  Has Anxiety Dx: No    Depression Patient Health Questionnaire:      1/9/2025     3:02 PM   Depression Patient Health Questionnaire   Over the last two weeks how often have you been bothered by little interest or pleasure in doing things Not at all   Over the last two weeks how often have you been bothered by feeling down, depressed or hopeless Not at all   PHQ-2 Total Score 0     Cognitive Function Screening:       No data to display              Cognitive Function Screening Total - Less than 4 = Abnormal,  Greater than or equal to 4 = Normal        WHAT MATTERS MOST:  Advance Care Planning   ACP Status:   Patient has had an ACP conversation  Living Will: No  Power of : Yes  [place]: No             ROS:  General: -fever, -chills, -fatigue, -weight gain, -weight loss  Eyes: -vision changes, -redness, -discharge  ENT: -ear pain, -nasal congestion, -sore throat, +ear pruritus  Cardiovascular: -chest pain, -palpitations, -lower extremity edema  Respiratory: -cough, -shortness of breath  Gastrointestinal: -abdominal pain, -nausea, -vomiting, -diarrhea, -constipation, -blood in stool  Genitourinary: -dysuria, -hematuria, -frequency  Musculoskeletal: -joint pain, -muscle pain, +pain with movement, +limb pain, +back pain  Skin: -rash,  "-lesion  Neurological: -headache, +dizziness, -numbness, -tingling, +decreased sensation in extremities  Psychiatric: -anxiety, -depression, -sleep difficulty          Past Medical History:   Diagnosis Date    Cataract     COVID-19 01/27/2022    GERD (gastroesophageal reflux disease)     Primary hypertension 12/22/2024    Trigeminal neuralgia     Type 2 diabetes mellitus with hyperglycemia, without long-term current use of insulin 5/26/2021       Past Surgical History:   Procedure Laterality Date    CATARACT EXTRACTION         Family History   Problem Relation Name Age of Onset    No Known Problems Mother      No Known Problems Father      Amblyopia Neg Hx      Blindness Neg Hx      Cancer Neg Hx      Cataracts Neg Hx      Diabetes Neg Hx      Glaucoma Neg Hx      Hypertension Neg Hx      Macular degeneration Neg Hx      Retinal detachment Neg Hx      Strabismus Neg Hx      Stroke Neg Hx      Thyroid disease Neg Hx         Social History     Socioeconomic History    Marital status:    Tobacco Use    Smoking status: Never    Smokeless tobacco: Never   Substance and Sexual Activity    Alcohol use: No    Drug use: No       Current Medications[1]    Review of patient's allergies indicates:  No Known Allergies      Objective:       Last 3 sets of Vitals        1/9/2025     2:59 PM 2/5/2025     9:17 AM 5/7/2025     8:59 AM   Vitals - 1 value per visit   SYSTOLIC 122 124 134   DIASTOLIC 68 82 86   Pulse 73 65 86   SPO2 96 % 96 % 96 %   Weight (lb) 181.55 180.23 173.17   Weight (kg) 82.35 81.75 78.55   Height  5' 6" (1.676 m) 5' 6" (1.676 m)   BMI (Calculated)  29.1 28   Pain Score Zero Zero Zero   Physical Exam  Constitutional:       General: He is not in acute distress.  HENT:      Head: Normocephalic.      Right Ear: External ear normal. There is impacted cerumen.      Left Ear: External ear normal. There is impacted cerumen.      Nose: Nose normal.      Mouth/Throat:      Mouth: Mucous membranes are moist. "   Eyes:      General: No scleral icterus.     Extraocular Movements: Extraocular movements intact.      Conjunctiva/sclera: Conjunctivae normal.   Neck:      Vascular: No carotid bruit.      Comments: No goiter.  Cardiovascular:      Rate and Rhythm: Normal rate and regular rhythm.      Pulses: Normal pulses.      Heart sounds: Normal heart sounds.   Pulmonary:      Effort: Pulmonary effort is normal.      Breath sounds: Normal breath sounds.   Abdominal:      General: Bowel sounds are normal. There is no distension.      Palpations: Abdomen is soft. There is no mass.      Tenderness: There is no abdominal tenderness.   Musculoskeletal:         General: No swelling or tenderness.      Comments: Mildly limited ROM with internal rotation of right shoulder.   Lymphadenopathy:      Cervical: No cervical adenopathy.   Skin:     General: Skin is warm and dry.   Neurological:      General: No focal deficit present.      Mental Status: He is alert and oriented to person, place, and time.   Psychiatric:         Mood and Affect: Mood normal.         Behavior: Behavior normal.       Protective Sensation (w/ 10 gram monofilament):  Right: Decreased  Left: Decreased    Visual Inspection:  Dry Skin -  Bilateral and Onychomycosis -  Bilateral    Pedal Pulses:   Right: Present  Left: Present    Posterior Tibialis Pulses:   Right:Present  Left: Present     CBC:  Recent Labs   Lab 05/12/23  1620 03/22/24  0824 12/13/24  0829   WBC 6.66 7.94 7.40   RBC 5.05 5.06 4.88   Hemoglobin 14.0 13.8 L 13.6 L   Hematocrit 42.2 41.3 41.1   Platelets 220 254 229   MCV 84 82 84   MCH 27.7 27.3 27.9   MCHC 33.2 33.4 33.1     CMP:  Recent Labs   Lab 06/14/24  0815 12/13/24  0829 05/02/25  0824   Glucose 133 H 135 H 128 H   Calcium 9.3 9.5 8.6 L   Albumin 3.7 4.0 3.6   Protein Total  --   --  7.3   Total Protein 7.4 7.5  --    Sodium 144 136 138   Potassium 3.9 4.1 4.1   CO2 23 24 20 L   Chloride 110 100 108   BUN 21 12 16   Creatinine 1.1 1.0 1.0    Alkaline Phosphatase 99 102  --    ALP  --   --  94   ALT 10 12 13   AST 12 16 11   Total Bilirubin 0.3 0.3  --    Bilirubin Total  --   --  0.3     URINALYSIS:  Recent Labs   Lab 05/02/25 0824   Color, UA Yellow   Spec Grav UA 1.030   pH, UA 5.0   Protein, UA Negative   Nitrites, UA Negative   Leukocyte Esterase, UA Negative   Urobilinogen, UA Negative      LIPIDS:  Recent Labs   Lab 05/12/23  1620 03/22/24  0824 06/14/24  0815 05/02/25  0824   TSH 1.498 3.727  --   --    HDL 49 51 47  --    HDL Cholesterol  --   --   --  42   Cholesterol Total  --   --   --  124   Cholesterol 205 H 208 H 130  --    Triglycerides 119 91 67  --    Triglyceride  --   --   --  90   LDL Cholesterol 132.2 138.8 69.6 64.0   HDL/Cholesterol Ratio 23.9 24.5 36.2 33.9   Non-HDL Cholesterol 156 157 83  --    Non HDL Cholesterol  --   --   --  82   Total Cholesterol/HDL Ratio 4.2 4.1 2.8  --    Cholesterol/HDL Ratio  --   --   --  3.0     TSH:  Recent Labs   Lab 05/12/23  1620 03/22/24  0824   TSH 1.498 3.727       A1C:  Recent Labs   Lab 10/18/22  0822 05/12/23  1620 03/22/24  0824 06/14/24  0815 12/13/24  0829 05/02/25  0824   Hemoglobin A1C 6.2 H 6.2 H 6.6 H 6.6 H 6.7 H  --    Hemoglobin A1c  --   --   --   --   --  6.6 H       Imaging:  X-ray Shoulder 2 or More Views Right  Narrative: EXAMINATION:  XR SHOULDER COMPLETE 2 OR MORE VIEWS RIGHT    CLINICAL HISTORY:  Pain in right shoulder    FINDINGS:  Shoulder complete three views right.    There is baseline DJD.  No fracture, dislocation, or bone destruction seen.  No trauma seen.  No acute process seen.  Impression: No acute process seen.    Electronically signed by: Clif Peters MD  Date:    05/07/2025  Time:    11:09      Assessment:       1. Primary hypertension    2. Type 2 diabetes mellitus with hyperosmolarity without coma, without long-term current use of insulin    3. Hyperlipidemia, unspecified hyperlipidemia type    4. Right shoulder pain, unspecified chronicity    5. Low  back pain with right-sided sciatica, unspecified back pain laterality, unspecified chronicity    6. Excessive cerumen in ear canal, unspecified laterality    7. Healthcare maintenance            Plan:       1. Primary hypertension  Overview:  Starting losartan 25 mg daily    Assessment & Plan:  Stable blooe pressure.   Continue losartan 25 mg daily.         2. Type 2 diabetes mellitus with hyperosmolarity without coma, without long-term current use of insulin  Overview:  On Metformin  mg daily.    Assessment & Plan:  - Monitored glucose levels, which showed slightly elevated readings of 128, and A1C level which have remained stable at 6.6.  - Norman is experiencing loss of sensation in toes, indicating diabetic polyneuropathy.  - Discussed importance of proper footwear to prevent complications from reduced sensation.  - Advised patient to continue monitoring glucose levels at home and maintain dietary control, especially due to temporary increase in glucose levels following steroid injections.  - Will continue current metformin regimen for diabetes management.      3. Hyperlipidemia, unspecified hyperlipidemia type  Overview:  Tolerating atorvastatin atorvastatin 20 mg daily    Assessment & Plan:  - Last LDL less than 70 less, at goal.  - Continue same treatment.  - Encourage healthy diet and exercise.        4. Right shoulder pain, unspecified chronicity  Assessment & Plan:  - Evaluated right shoulder pain during exam, possibly related to rotator cuff tendon pathology.  - Explained potential benefits of physical therapy for shoulder strengthening and pain management.  - Referred patient to a shoulder specialist for evaluation of potential rotator cuff tear and consideration of steroid injection.  - Also referred to physical therapy for shoulder rehabilitation to improve strength and mobility.  - Prescribed Celebrex 100 mg daily for inflammation and pain management, to be taken for approximately 1 month,  balancing efficacy against fall risk.    Orders:  -     Ambulatory referral/consult to Orthopedics; Future; Expected date: 05/07/2025  -     Ambulatory Referral/Consult to Physical Therapy; Future; Expected date: 05/07/2025  -     X-ray Shoulder 2 or More Views Right; Future; Expected date: 05/07/2025  -     ketorolac injection 30 mg  -     celecoxib (CELEBREX) 100 MG capsule; Take 1 capsule (100 mg total) by mouth daily as needed for Pain.  Dispense: 30 capsule; Refill: 0    5. Low back pain with right-sided sciatica, unspecified back pain laterality, unspecified chronicity  Assessment & Plan:  - Evaluated lower back discomfort during physical exam.  - Advised patient to start physical therapy for back pain.  - Prescribed Toradol injection and will manage pain with Celebrex as noted above.  - Norman currently uses a cane and occasionally a walker.  - Advised to continue using these devices for mobility support.    Orders:  -     Ambulatory referral/consult to Orthopedics; Future; Expected date: 05/07/2025  -     celecoxib (CELEBREX) 100 MG capsule; Take 1 capsule (100 mg total) by mouth daily as needed for Pain.  Dispense: 30 capsule; Refill: 0    6. Excessive cerumen in ear canal, unspecified laterality  Comments:  - Evaluated right ear complaints of itching and noted presence of cerumen.  - Performed ear cleaning procedure by nurse with application of otic drops.  Orders:  -     carbamide peroxide 6.5 % otic solution 5 drop  -     Ear wax removal    7. Healthcare maintenance  Assessment & Plan:  - Yearly labs- 5/2/25  - Colon cancer screening- no longer indicated  - ACP-completed HPOA 6/20/24, 2/14/25.  - Eye exam- last on 2022, referral placed.  Has appointment.  - Foot exam- done on 05/7/25  - Vaccination- due for RSV, COVID booster.        FOLLOW-UP:  - Follow up in 6 weeks to assess progress and determine if additional medications or interventions are needed.        Health Maintenance Due   Topic Date Due     RSV Vaccine (Age 60+ and Pregnant patients) (1 - 1-dose 75+ series) Never done    Diabetic Eye Exam  09/29/2023    COVID-19 Vaccine (4 - 2024-25 season) 09/01/2024        This includes face to face time and non-face to face time preparing to see the patient (eg, review of tests), obtaining and/or reviewing separately obtained history, documenting clinical information in the electronic or other health record, independently interpreting results and communicating results to the patient/family/caregiver, or care coordinator.     RETURN TO CLINIC IN: 1.5 MONTH    FOR NEXT VISIT: MEDICATION MONITORING       Darshana Brandon MD  [facility]  Disclaimer:  This note has been generated using voice-recognition software. There may be grammatical or spelling errors that have been missed during proof-reading           [1]   Current Outpatient Medications   Medication Sig Dispense Refill    acetic acid (VOSOL) 2 % otic solution Place 4 drops into both ears 3 (three) times daily as needed (itching). 6 mL 1    atorvastatin (LIPITOR) 20 MG tablet Take 1 tablet (20 mg total) by mouth once daily. 90 tablet 3    benzonatate (TESSALON) 200 MG capsule Take 1 capsule (200 mg total) by mouth 3 (three) times daily as needed for Cough. 30 capsule 1    blood sugar diagnostic Strp To check BG 2 times daily, to use with insurance preferred meter 100 strip 11    cetirizine (ZYRTEC) 10 MG tablet Take 1 tablet (10 mg total) by mouth daily as needed for Allergies. 30 tablet 2    fluocinolone acetonide oiL (DERMOTIC OIL) 0.01 % Drop Place 3 drops in ear(s) 2 (two) times daily. 20 mL 3    fluticasone propionate (FLONASE) 50 mcg/actuation nasal spray 2 sprays (100 mcg total) by Each Nostril route once daily. 16 g 2    lancets Misc To check BG 2 times daily, to use with insurance preferred meter 100 each 11    losartan (COZAAR) 25 MG tablet Take 1 tablet (25 mg total) by mouth once daily. 90 tablet 3    meclizine (ANTIVERT) 12.5 mg tablet Take 1  tablet (12.5 mg total) by mouth 3 (three) times daily as needed for Dizziness. 60 tablet 2    metFORMIN (GLUCOPHAGE-XR) 500 MG ER 24hr tablet TOME JAK TABLETA TODOS LOS SWENSON CON EL DESAYUNO 90 tablet 2    omeprazole (PRILOSEC) 20 MG capsule Take 1 capsule (20 mg total) by mouth once daily. Para el reflujo. 90 capsule 3    OXcarbazepine (TRILEPTAL) 300 MG Tab TOME 1 TABLETA DOS VECES AL GRANT 180 tablet 2    tamsulosin (FLOMAX) 0.4 mg Cap Take 1 capsule (0.4 mg total) by mouth once daily. 90 capsule 3    aspirin (ECOTRIN) 81 MG EC tablet Take 1 tablet (81 mg total) by mouth once daily. 30 tablet 11    blood-glucose meter kit To check BG 2 times daily, to use with insurance preferred meter (Patient not taking: Reported on 1/9/2025) 1 each 5    celecoxib (CELEBREX) 100 MG capsule Take 1 capsule (100 mg total) by mouth daily as needed for Pain. 30 capsule 0     No current facility-administered medications for this visit.

## 2025-05-08 PROBLEM — E11.00 TYPE 2 DIABETES MELLITUS WITH HYPEROSMOLARITY WITHOUT COMA, WITHOUT LONG-TERM CURRENT USE OF INSULIN: Status: ACTIVE | Noted: 2025-05-08

## 2025-05-09 NOTE — ASSESSMENT & PLAN NOTE
- Monitored glucose levels, which showed slightly elevated readings of 128, and A1C level which have remained stable at 6.6.  - Norman is experiencing loss of sensation in toes, indicating diabetic polyneuropathy.  - Discussed importance of proper footwear to prevent complications from reduced sensation.  - Advised patient to continue monitoring glucose levels at home and maintain dietary control, especially due to temporary increase in glucose levels following steroid injections.  - Will continue current metformin regimen for diabetes management.

## 2025-05-09 NOTE — ASSESSMENT & PLAN NOTE
- Last LDL less than 70 less, at goal.  - Continue same treatment.  - Encourage healthy diet and exercise.

## 2025-05-09 NOTE — ASSESSMENT & PLAN NOTE
- Evaluated right shoulder pain during exam, possibly related to rotator cuff tendon pathology.  - Explained potential benefits of physical therapy for shoulder strengthening and pain management.  - Referred patient to a shoulder specialist for evaluation of potential rotator cuff tear and consideration of steroid injection.  - Also referred to physical therapy for shoulder rehabilitation to improve strength and mobility.  - Prescribed Celebrex 100 mg daily for inflammation and pain management, to be taken for approximately 1 month, balancing efficacy against fall risk.

## 2025-05-09 NOTE — ASSESSMENT & PLAN NOTE
- Evaluated lower back discomfort during physical exam.  - Advised patient to start physical therapy for back pain.  - Prescribed Toradol injection and will manage pain with Celebrex as noted above.  - Norman currently uses a cane and occasionally a walker.  - Advised to continue using these devices for mobility support.

## 2025-05-09 NOTE — ASSESSMENT & PLAN NOTE
- Yearly labs- 5/2/25  - Colon cancer screening- no longer indicated  - ACP-completed HPOA 6/20/24, 2/14/25.  - Eye exam- last on 2022, referral placed.  Has appointment.  - Foot exam- done on 05/7/25  - Vaccination- due for RSV, COVID booster.

## 2025-05-12 ENCOUNTER — TELEPHONE (OUTPATIENT)
Dept: ORTHOPEDICS | Facility: CLINIC | Age: OVER 89
End: 2025-05-12
Payer: MEDICARE

## 2025-05-14 ENCOUNTER — OFFICE VISIT (OUTPATIENT)
Dept: ORTHOPEDICS | Facility: CLINIC | Age: OVER 89
End: 2025-05-14
Payer: MEDICARE

## 2025-05-14 DIAGNOSIS — S46.011A TRAUMATIC COMPLETE TEAR OF RIGHT ROTATOR CUFF, INITIAL ENCOUNTER: ICD-10-CM
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
## 2025-05-14 PROCEDURE — 1101F PT FALLS ASSESS-DOCD LE1/YR: CPT | Mod: CPTII,HCNC,S$GLB, | Performed by: ORTHOPAEDIC SURGERY

## 2025-05-14 PROCEDURE — 3288F FALL RISK ASSESSMENT DOCD: CPT | Mod: CPTII,HCNC,S$GLB, | Performed by: ORTHOPAEDIC SURGERY

## 2025-05-14 PROCEDURE — 1159F MED LIST DOCD IN RCRD: CPT | Mod: CPTII,HCNC,S$GLB, | Performed by: ORTHOPAEDIC SURGERY

## 2025-05-14 PROCEDURE — 1125F AMNT PAIN NOTED PAIN PRSNT: CPT | Mod: CPTII,HCNC,S$GLB, | Performed by: ORTHOPAEDIC SURGERY

## 2025-05-14 PROCEDURE — 99999 PR PBB SHADOW E&M-EST. PATIENT-LVL III: CPT | Mod: PBBFAC,HCNC,, | Performed by: ORTHOPAEDIC SURGERY

## 2025-05-14 PROCEDURE — 99204 OFFICE O/P NEW MOD 45 MIN: CPT | Mod: HCNC,S$GLB,, | Performed by: ORTHOPAEDIC SURGERY

## 2025-05-14 PROCEDURE — 1157F ADVNC CARE PLAN IN RCRD: CPT | Mod: CPTII,HCNC,S$GLB, | Performed by: ORTHOPAEDIC SURGERY

## 2025-05-14 PROCEDURE — 1160F RVW MEDS BY RX/DR IN RCRD: CPT | Mod: CPTII,HCNC,S$GLB, | Performed by: ORTHOPAEDIC SURGERY

## 2025-05-15 ENCOUNTER — OFFICE VISIT (OUTPATIENT)
Dept: OTOLARYNGOLOGY | Facility: CLINIC | Age: OVER 89
End: 2025-05-15
Payer: MEDICARE

## 2025-05-15 VITALS
DIASTOLIC BLOOD PRESSURE: 67 MMHG | WEIGHT: 153.44 LBS | HEART RATE: 80 BPM | BODY MASS INDEX: 24.77 KG/M2 | SYSTOLIC BLOOD PRESSURE: 114 MMHG

## 2025-05-15 DIAGNOSIS — H90.3 SENSORINEURAL HEARING LOSS (SNHL) OF BOTH EARS: Chronic | ICD-10-CM

## 2025-05-15 DIAGNOSIS — H61.23 BILATERAL IMPACTED CERUMEN: Chronic | ICD-10-CM

## 2025-05-15 DIAGNOSIS — H60.63 CHRONIC OTITIS EXTERNA OF BOTH EARS, UNSPECIFIED TYPE: Primary | Chronic | ICD-10-CM

## 2025-05-15 PROCEDURE — 99999 PR PBB SHADOW E&M-EST. PATIENT-LVL III: CPT | Mod: PBBFAC,HCNC,, | Performed by: OTOLARYNGOLOGY

## 2025-05-15 RX ORDER — NYSTATIN AND TRIAMCINOLONE ACETONIDE 100000; 1 [USP'U]/G; MG/G
OINTMENT TOPICAL 2 TIMES DAILY
Qty: 15 G | Refills: 0 | Status: SHIPPED | OUTPATIENT
Start: 2025-05-15

## 2025-05-15 RX ORDER — FLUOCINOLONE ACETONIDE 0.11 MG/ML
3 OIL AURICULAR (OTIC) 2 TIMES DAILY
Qty: 20 ML | Refills: 3 | Status: SHIPPED | OUTPATIENT
Start: 2025-05-15

## 2025-05-15 NOTE — PATIENT INSTRUCTIONS
Refill dermotic ear drops.  Use daily or weekly.    Use mycolog ointment on external ear daily or weekly as needed.     Place drops in ear first and then place ointment on outer part of ear and ear canal.     I would recommend the use of an over the counter moisturizing drop such as baby oil, mineral oil, Vitamin E oil, etc on a weekly basis.    You should avoid Qtip use in the ears.    If the ear feels wet, use a hairdryer on a cool setting to dry the ears.      Audiogram was reviewed in detail with the patient, and they understand their hearing loss.    If and when they are a candidate for hearing aids, they were given information on how to contact the audiologist for this appointment.  He should contact his insurance company about which audiologists he can contact about hearing aids.     I recommend annual audiograms as well as hearing protection in noise.

## 2025-05-15 NOTE — PROGRESS NOTES
Chief Complaint   Patient presents with    Hearing Loss     One ear hearing loss /constant hearing      [employer] language interpretation unit used for entirety of exam.    HPI 2022:  Patient is a 89 y.o. male who has previously seen me for cerumen impaction and chronic otitis externa, sensorineural hearing loss.   He is here for his routine hearing screen but was noted to have cerumen impaction bilaterally.      Since the last visit, the patient reports he has used Q-tips in the ears for itching.    Interval HPI 05/15/2025 :      Complains of continued ear itching and feeling of ears being blocked.    Does not report any changes to his hearing.     Active Ambulatory Problems     Diagnosis Date Noted    Vertigo 03/01/2013    Trigeminal neuralgia 01/23/2014    GERD (gastroesophageal reflux disease) 02/10/2015    Right shoulder pain 02/10/2015    Low back pain 11/16/2015    Type 2 diabetes mellitus with hyperglycemia, without long-term current use of insulin 05/26/2021    Depression 05/26/2021    Acute respiratory failure due to COVID-19 01/27/2022    COVID-19 Multifocal pneumonia 01/27/2022    Hearing difficulty 04/22/2022    Hyperlipidemia 04/22/2022    Frequency of urination 04/26/2022    Benign prostatic hyperplasia with nocturia 04/26/2022    Left-sided trigeminal neuralgia 04/10/2024    Healthcare maintenance 04/10/2024    Benign paroxysmal vertigo, left ear 09/20/2024    Primary hypertension 12/22/2024    Allergic sinusitis 02/05/2025    Type 2 diabetes mellitus with hyperosmolarity without coma, without long-term current use of insulin 05/08/2025     Resolved Ambulatory Problems     Diagnosis Date Noted    Dermatitis 03/01/2013    Current moderate episode of major depressive disorder, unspecified whether recurrent 12/28/2023     Past Medical History:   Diagnosis Date    Cataract     COVID-19 01/27/2022       Review of Systems  General: negative for chills, fever or weight loss  Psychological: negative for mood  changes or depression  Ophthalmic: negative for blurry vision, photophobia or eye pain  ENT: see HPI  Respiratory: no cough, shortness of breath, or wheezing  Cardiovascular: no chest pain or dyspnea on exertion  Gastrointestinal: no abdominal pain, change in bowel habits, or black/ bloody stools  Musculoskeletal: negative for gait disturbance or muscular weakness  Neurological: no syncope or seizures; no ataxia  Dermatological: negative for pruritis, rash and jaundice  Hematologic/lymphatic: no easy bruising, no new adenopathy    Physical Exam     Vitals:    05/15/25 1458   BP: 114/67   Pulse: 80           Constitutional:   He is oriented to person, place, and time. Vital signs are normal. He appears well-developed and well-nourished. He appears alert. He is cooperative.  Non-toxic appearance. Normal speech.      Head:  Normocephalic and atraumatic. Salivary glands normal.  Facial strength is normal.      Ears:    Right Ear: There is drainage (wet cerumen). Tympanic membrane is not perforated, not erythematous and not retracted. No middle ear effusion.   Left Ear: There is drainage (wet cerumen). Tympanic membrane is not perforated, not erythematous and not retracted.  No middle ear effusion.   Ears:      Nose:  Mucosal edema present. No rhinorrhea, septal deviation, nasal septal hematoma or polyps. No epistaxis. No turbinate masses and no turbinate hypertrophy (2+ size).  Right sinus exhibits no maxillary sinus tenderness and no frontal sinus tenderness. Left sinus exhibits no maxillary sinus tenderness and no frontal sinus tenderness.     Mouth/Throat  Oropharynx clear and moist without lesions or asymmetry, normal uvula midline, lips, teeth, and gums normal and oropharynx normal. Normal dentition. Mirror exam not performed due to patient tolerance.  Mirror exam not performed due to patient tolerance.      Neck:  Neck normal without thyromegaly masses, asymmetry, normal tracheal structure, crepitus, and  tenderness, thyroid normal, trachea normal, full range of motion with neck supple and no adenopathy. No JVD present. Carotid bruit is not present. Thyroid tenderness is present. No edema and no erythema present. No thyroid mass and no thyromegaly present.     He has no cervical adenopathy.     Cardiovascular:    Normal rate, regular rhythm, normal heart sounds and rate and rhythm, heart sounds, and pulses normal.              Pulmonary/Chest:   Effort and breath sounds normal.     Psychiatric:   He has a normal mood and affect. His speech is normal and behavior is normal.     Neurological:   He is alert and oriented to person, place, and time. He has neurological normal, alert and oriented. No cranial nerve deficit.     Skin:   No abrasions, lacerations, lesions, or rashes.       Ear Cerumen Removal    Date/Time: 5/15/2025 2:20 PM    Performed by: Vickie Rivera MD  Authorized by: Vickie Rivera MD    Consent Done?:  Yes (Verbal)    Local anesthetic:  None  Location details:  Both ears  Procedure type: curette    Procedure type comment:  Suction  Cerumen  Removal Results:  Cerumen completely removed  Patient tolerance:  Patient tolerated the procedure well with no immediate complications     Dry skin and skin irritation bilateral ear canals, consistent with chronic otitis externa.  Binocular microscopy utilized throughout procedure for adequate visualization of EAC and TM during removal of deeply impacted CI.          Audiogram:  Interpreted by me and reviewed with the patient today.  Results limited by CI in EAC, but not significantly changed from previous audiogram.   Will plan  to recheck in next few weeks after using ointment and drops for MIYA.                Assessment/Plan:      ICD-10-CM ICD-9-CM    1. Chronic otitis externa of both ears, unspecified type  H60.63 380.23 nystatin-triamcinolone (MYCOLOG) ointment      fluocinolone acetonide oiL (DERMOTIC OIL) 0.01 % Drop      2. Sensorineural hearing loss  (SNHL) of both ears  H90.3 389.18       3. Bilateral impacted cerumen  H61.23 380.4               Discontinue Q-tip use.  Derm otic ear drops and mycolog given for patient to use to prevent itching and chronic otitis externa symptoms.    Hearing protection in noise and annual audiograms.  Hearing aid consultation if desired- will need repeat audio since today's was limited by MIYA.      Patient will reach out to his insurance about where he can pursue hearing aids.          Vickie Rivera MD  [facility]

## 2025-05-15 NOTE — PROGRESS NOTES
Patient ID:   Norman Saunders is a 89 y.o. male.    Chief Complaint:   Right shoulder pain    HPI:   The patient is being interviewed and examined with an .     The patient is a 89 year old male who sustained a fall several months ago. After the fall, he started to experience pain in the right shoulder. Initially, he struggled to move the right shoulder. He has been able to gain some of his function back but does have pain. The pain is present over the anterolateral aspect of the shoulder. The pain is worse with reaching and attempted overhead activities. He has not tried any treatments but medication profile indicates that he was prescribed Celebrex last week.     Medications:  Current Medications[1]    Allergies:  Review of patient's allergies indicates:  No Known Allergies    Past Medical History:  Past Medical History:   Diagnosis Date    Cataract     COVID-19 01/27/2022    GERD (gastroesophageal reflux disease)     Primary hypertension 12/22/2024    Trigeminal neuralgia     Type 2 diabetes mellitus with hyperglycemia, without long-term current use of insulin 5/26/2021        Past Surgical History:  Past Surgical History:   Procedure Laterality Date    CATARACT EXTRACTION         Social History:  Social History     Occupational History    Not on file   Tobacco Use    Smoking status: Never    Smokeless tobacco: Never   Substance and Sexual Activity    Alcohol use: No    Drug use: No    Sexual activity: Not on file       Family History:  Family History   Problem Relation Name Age of Onset    No Known Problems Mother      No Known Problems Father      Amblyopia Neg Hx      Blindness Neg Hx      Cancer Neg Hx      Cataracts Neg Hx      Diabetes Neg Hx      Glaucoma Neg Hx      Hypertension Neg Hx      Macular degeneration Neg Hx      Retinal detachment Neg Hx      Strabismus Neg Hx      Stroke Neg Hx      Thyroid disease Neg Hx          ROS:  Review of Systems   Musculoskeletal:  Positive for falls, joint  pain, muscle weakness and myalgias.   All other systems reviewed and are negative.      Vitals:  There were no vitals taken for this visit.    Physical Examination:  Comprehensive Orthopaedic Musculoskeletal Exam    General      Constitutional: appears stated age, well-developed and well-nourished    Scleral icterus: no    Labored breathing: no    Psychiatric: normal mood and affect and no acute distress    Neurological: alert and oriented x3    Skin: intact    Lymphadenopathy: none     Ortho Exam   Right shoulder exam:  No visible deformity or atrophy.   ROM: active elevation 170, ER at the side to 30, IR to lumbar spine  RTC strength 4+/5 in elevation and ER, 5/5 IR  Positive Neer. Positive Peterson   No AC tenderness.     Imaging:  I have independently reviewed the following imaging studies performed at [facility]:    X-ray Shoulder 2 or More Views Right  Narrative: EXAMINATION:  XR SHOULDER COMPLETE 2 OR MORE VIEWS RIGHT    CLINICAL HISTORY:  Pain in right shoulder    FINDINGS:  Shoulder complete three views right.    There is baseline DJD.  No fracture, dislocation, or bone destruction seen.  No trauma seen.  No acute process seen.  Impression: No acute process seen.    Electronically signed by: Clif Peters MD  Date:    05/07/2025  Time:    11:09      Assessment:  1. Traumatic complete tear of right rotator cuff, initial encounter    2. Fall, initial encounter      Plan:  I reviewed the clinical and radiographic findings. Given his history of fall and slight superior migration of the humeral head on x-rays, I suspect that he tore his rotator cuff. I have advised a course of PT and continuation of Celebrex. He will return if pain continues to persist despite these measures.     Orders Placed This Encounter    Ambulatory Referral/Consult to Physical Therapy     No follow-ups on file.              [1]   Current Outpatient Medications:     acetic acid (VOSOL) 2 % otic solution, Place 4 drops into both ears 3 (three)  times daily as needed (itching)., Disp: 6 mL, Rfl: 1    atorvastatin (LIPITOR) 20 MG tablet, Take 1 tablet (20 mg total) by mouth once daily., Disp: 90 tablet, Rfl: 3    benzonatate (TESSALON) 200 MG capsule, Take 1 capsule (200 mg total) by mouth 3 (three) times daily as needed for Cough., Disp: 30 capsule, Rfl: 1    blood sugar diagnostic Strp, To check BG 2 times daily, to use with insurance preferred meter, Disp: 100 strip, Rfl: 11    celecoxib (CELEBREX) 100 MG capsule, Take 1 capsule (100 mg total) by mouth daily as needed for Pain., Disp: 30 capsule, Rfl: 0    cetirizine (ZYRTEC) 10 MG tablet, Take 1 tablet (10 mg total) by mouth daily as needed for Allergies., Disp: 30 tablet, Rfl: 2    fluocinolone acetonide oiL (DERMOTIC OIL) 0.01 % Drop, Place 3 drops in ear(s) 2 (two) times daily., Disp: 20 mL, Rfl: 3    fluticasone propionate (FLONASE) 50 mcg/actuation nasal spray, 2 sprays (100 mcg total) by Each Nostril route once daily., Disp: 16 g, Rfl: 2    lancets Misc, To check BG 2 times daily, to use with insurance preferred meter, Disp: 100 each, Rfl: 11    losartan (COZAAR) 25 MG tablet, Take 1 tablet (25 mg total) by mouth once daily., Disp: 90 tablet, Rfl: 3    meclizine (ANTIVERT) 12.5 mg tablet, Take 1 tablet (12.5 mg total) by mouth 3 (three) times daily as needed for Dizziness., Disp: 60 tablet, Rfl: 2    metFORMIN (GLUCOPHAGE-XR) 500 MG ER 24hr tablet, TOME JAK TABLETA TODOS LOS SWENSON CON EL DESAYUNO, Disp: 90 tablet, Rfl: 2    omeprazole (PRILOSEC) 20 MG capsule, Take 1 capsule (20 mg total) by mouth once daily. Para el reflujo., Disp: 90 capsule, Rfl: 3    OXcarbazepine (TRILEPTAL) 300 MG Tab, TOME 1 TABLETA DOS VECES AL GRANT, Disp: 180 tablet, Rfl: 2    tamsulosin (FLOMAX) 0.4 mg Cap, Take 1 capsule (0.4 mg total) by mouth once daily., Disp: 90 capsule, Rfl: 3    aspirin (ECOTRIN) 81 MG EC tablet, Take 1 tablet (81 mg total) by mouth once daily., Disp: 30 tablet, Rfl: 11    blood-glucose meter kit, To  check BG 2 times daily, to use with insurance preferred meter (Patient not taking: Reported on 1/9/2025), Disp: 1 each, Rfl: 5

## 2025-05-15 NOTE — Clinical Note
Emilie- I didn't make this gentleman repeat the audio since he needed to use some drops to get his otitis externa under better control.  It doesn't seem there was much change in his hearing overall.  We can having him come back in a couple of months to repeat once he's used the medications.  And, he wanted to try and pursue hearing aids as well.

## 2025-06-03 ENCOUNTER — OFFICE VISIT (OUTPATIENT)
Dept: OPTOMETRY | Facility: CLINIC | Age: OVER 89
End: 2025-06-03
Payer: MEDICARE

## 2025-06-03 DIAGNOSIS — H52.203 ASTIGMATISM OF BOTH EYES, UNSPECIFIED TYPE: ICD-10-CM

## 2025-06-03 DIAGNOSIS — Z96.1 PSEUDOPHAKIA OF BOTH EYES: ICD-10-CM

## 2025-06-03 DIAGNOSIS — E11.00 TYPE 2 DIABETES MELLITUS WITH HYPEROSMOLARITY WITHOUT COMA, WITHOUT LONG-TERM CURRENT USE OF INSULIN: ICD-10-CM

## 2025-06-03 DIAGNOSIS — E11.65 TYPE 2 DIABETES MELLITUS WITH HYPERGLYCEMIA, WITHOUT LONG-TERM CURRENT USE OF INSULIN: ICD-10-CM

## 2025-06-03 DIAGNOSIS — H52.4 PRESBYOPIA: Primary | ICD-10-CM

## 2025-06-03 PROCEDURE — 99999 PR PBB SHADOW E&M-EST. PATIENT-LVL III: CPT | Mod: PBBFAC,HCNC,, | Performed by: OPTOMETRIST

## 2025-06-03 PROCEDURE — 92015 DETERMINE REFRACTIVE STATE: CPT | Mod: HCNC,S$GLB,, | Performed by: OPTOMETRIST

## 2025-06-03 PROCEDURE — 1159F MED LIST DOCD IN RCRD: CPT | Mod: CPTII,HCNC,S$GLB, | Performed by: OPTOMETRIST

## 2025-06-03 PROCEDURE — 1126F AMNT PAIN NOTED NONE PRSNT: CPT | Mod: CPTII,HCNC,S$GLB, | Performed by: OPTOMETRIST

## 2025-06-03 PROCEDURE — 2023F DILAT RTA XM W/O RTNOPTHY: CPT | Mod: CPTII,HCNC,S$GLB, | Performed by: OPTOMETRIST

## 2025-06-03 PROCEDURE — 92014 COMPRE OPH EXAM EST PT 1/>: CPT | Mod: HCNC,S$GLB,, | Performed by: OPTOMETRIST

## 2025-06-03 PROCEDURE — 1157F ADVNC CARE PLAN IN RCRD: CPT | Mod: CPTII,HCNC,S$GLB, | Performed by: OPTOMETRIST

## 2025-06-03 PROCEDURE — 1160F RVW MEDS BY RX/DR IN RCRD: CPT | Mod: CPTII,HCNC,S$GLB, | Performed by: OPTOMETRIST

## 2025-06-17 ENCOUNTER — OFFICE VISIT (OUTPATIENT)
Dept: PRIMARY CARE CLINIC | Facility: CLINIC | Age: OVER 89
End: 2025-06-17
Payer: MEDICARE

## 2025-06-17 VITALS
DIASTOLIC BLOOD PRESSURE: 82 MMHG | HEIGHT: 66 IN | BODY MASS INDEX: 28.77 KG/M2 | SYSTOLIC BLOOD PRESSURE: 128 MMHG | HEART RATE: 80 BPM | OXYGEN SATURATION: 95 % | WEIGHT: 179 LBS

## 2025-06-17 DIAGNOSIS — I10 PRIMARY HYPERTENSION: ICD-10-CM

## 2025-06-17 DIAGNOSIS — E11.9 TYPE 2 DIABETES MELLITUS WITHOUT COMPLICATION, WITHOUT LONG-TERM CURRENT USE OF INSULIN: Primary | ICD-10-CM

## 2025-06-17 DIAGNOSIS — Z00.00 HEALTHCARE MAINTENANCE: ICD-10-CM

## 2025-06-17 DIAGNOSIS — H66.90 OTITIS, UNSPECIFIED LATERALITY: ICD-10-CM

## 2025-06-17 DIAGNOSIS — M25.511 RIGHT SHOULDER PAIN, UNSPECIFIED CHRONICITY: ICD-10-CM

## 2025-06-17 PROBLEM — E11.00 TYPE 2 DIABETES MELLITUS WITH HYPEROSMOLARITY WITHOUT COMA, WITHOUT LONG-TERM CURRENT USE OF INSULIN: Status: RESOLVED | Noted: 2025-05-08 | Resolved: 2025-06-17

## 2025-06-17 PROCEDURE — 1101F PT FALLS ASSESS-DOCD LE1/YR: CPT | Mod: CPTII,HCNC,S$GLB, | Performed by: INTERNAL MEDICINE

## 2025-06-17 PROCEDURE — 3288F FALL RISK ASSESSMENT DOCD: CPT | Mod: CPTII,HCNC,S$GLB, | Performed by: INTERNAL MEDICINE

## 2025-06-17 PROCEDURE — 1160F RVW MEDS BY RX/DR IN RCRD: CPT | Mod: CPTII,HCNC,S$GLB, | Performed by: INTERNAL MEDICINE

## 2025-06-17 PROCEDURE — 1126F AMNT PAIN NOTED NONE PRSNT: CPT | Mod: CPTII,HCNC,S$GLB, | Performed by: INTERNAL MEDICINE

## 2025-06-17 PROCEDURE — 1157F ADVNC CARE PLAN IN RCRD: CPT | Mod: CPTII,HCNC,S$GLB, | Performed by: INTERNAL MEDICINE

## 2025-06-17 PROCEDURE — 99999 PR PBB SHADOW E&M-EST. PATIENT-LVL IV: CPT | Mod: PBBFAC,HCNC,, | Performed by: INTERNAL MEDICINE

## 2025-06-17 PROCEDURE — 1159F MED LIST DOCD IN RCRD: CPT | Mod: CPTII,HCNC,S$GLB, | Performed by: INTERNAL MEDICINE

## 2025-06-17 PROCEDURE — 99215 OFFICE O/P EST HI 40 MIN: CPT | Mod: HCNC,S$GLB,, | Performed by: INTERNAL MEDICINE

## 2025-06-17 RX ORDER — LIDOCAINE 50 MG/G
1 PATCH TOPICAL DAILY
Qty: 30 PATCH | Refills: 3 | Status: SHIPPED | OUTPATIENT
Start: 2025-06-17

## 2025-06-17 RX ORDER — LOSARTAN POTASSIUM 25 MG/1
12.5 TABLET ORAL DAILY
Qty: 45 TABLET | Refills: 3 | Status: SHIPPED | OUTPATIENT
Start: 2025-06-17 | End: 2026-06-17

## 2025-06-17 RX ORDER — CELECOXIB 100 MG/1
100 CAPSULE ORAL DAILY PRN
Qty: 30 CAPSULE | Refills: 0 | Status: SHIPPED | OUTPATIENT
Start: 2025-06-17

## 2025-06-17 NOTE — ASSESSMENT & PLAN NOTE
- Norman reports shoulder pain, especially with full extension backward.  - Orthopedic physician diagnosed a complete ligament tear and recommended physical therapy.  - Recovery is expected to be slow, potentially taking months.  - Referred to physical therapy for shoulder rehabilitation with emphasis on proper technique and maintaining strength and function.  - Norman is taking Celecoxib daily.  - Recommend alternating between Celebrex and Tylenol: Celebrex one day, Tylenol the next, to assess comparative efficacy.  - Prescribed lidocaine patches for pain as needed.  - Will consider injection if pain persists after physical therapy.  - Norman advised to use a cane for balance if needed without becoming dependent, and to continue independent daily activities (dressing, bathing) as able to maintain function.

## 2025-06-17 NOTE — ASSESSMENT & PLAN NOTE
- Last A1C was less than 6.6, indicating good diabetes control.  - Continued Metformin for management.  - Ordered labs to monitor medication effects, to be completed before next visit.

## 2025-06-17 NOTE — ASSESSMENT & PLAN NOTE
- Norman reports itching of both ears, especially when scratched.  - Examination revealed no visible irritation, fungus, or infection.  - Continued treatment with ear drops twice daily and cream.

## 2025-06-17 NOTE — PROGRESS NOTES
"Subjective:       Patient ID: Norman Saunders is a 89 y.o. male.    Chief Complaint: Med Change Follow Up      HPI  Norman Saunders is a 89 y.o. male with diabetes mellitus type 2, HLD, depression, trigeminal neuralgia, GERD who presents today for Med Change Follow Up    Norman presents today for follow up of shoulder pain    MUSCULOSKELETAL:  He saw orthopedist and noted with a complete ligament tear in shoulder with pain during range of motion, particularly with extension. Pain is intermittent rather than constant. He has been previously evaluated by orthopedics. He takes Celebrex (celecoxib) for pain management.    ENT CONCERNS:  He reports ear itching with sensation of a "little ball" inside, which worsens with scratching. He uses a combination treatment of ear drops at night and cream in the morning. He reports auditory hallucinations, specifically hearing children's voices. He denies ear pain or dizziness.    MEDICAL HISTORY:  He has diabetes with good glycemic control, with most recent A1C less than 6.6%. Controlled on Metformin.  He reports recent weight gain of 6 lbs despite eating small amounts.    Blood pressure is controlled on half of the Losartan. Deneis headaches, chest pain, or shortness of breath. No leg swelling.    CURRENT MEDICATIONS:  He takes Metformin daily for diabetes management, Losartan for hypertension, and Meclizine daily in the morning for dizziness which finds it helps. Also on atorvastatin, fluocinolone drops and Mycolog cream.     4Ms for Medical Decision-Making in Older Adults    Last Completed EAWV:  None    MEDICATIONS:  High Risk Medications:  Total Active Medications: 1  OXcarbazepine Tab - 300 MG    MOBILITY:  Activities of Daily Livin/8/2025    11:15 PM   Activities of Daily Living   Ambulation Independent   Dressing Independent   Transfers Independent   Toileting Continent of bladder   Feeding Independent   Cleaning home/Chores Assistance Required   Taking meds " Assistance Required     Fall Risk:      2025     9:00 AM 5/15/2025     2:20 PM 2025     3:45 PM   Fall Risk Assessment - Outpatient   Mobility Status Ambulatory Ambulatory Ambulatory   Number of falls 0 1 1   Identified as fall risk False False False     Disability Status:       No data to display              Nutrition Screenin/8/2025    11:16 PM   Nutrition Screening   Has food intake declined over the past three months due to loss of appetite, digestive problems, chewing or swallowing difficulties? No decrease in food intake   Involuntary weight loss during the last 3 months? No weight loss   Mobility? Goes out   Has the patient suffered psychological stress or acute disease in the past three months? No   Neuropsychological problems? No psychological problems   Body Mass Index (BMI)?  BMI 23 or greater   Screening Score 14   Interpretation Normal nutritional status    Screening Score: 0-7 Malnourished, 8-11 At Risk, 12-14 Normal  Get Up and Go:       No data to display              Whisper Test:       No data to display                    MENTATION:   Has Dementia Dx: No  Has Anxiety Dx: No    Depression Patient Health Questionnaire:      2025     4:07 PM   Depression Patient Health Questionnaire   Over the last two weeks how often have you been bothered by little interest or pleasure in doing things Not at all   Over the last two weeks how often have you been bothered by feeling down, depressed or hopeless Not at all   PHQ-2 Total Score 0     Cognitive Function Screening:       No data to display              Cognitive Function Screening Total - Less than 4 = Abnormal,  Greater than or equal to 4 = Normal        WHAT MATTERS MOST:  Advance Care Planning   ACP Status:   Patient has had an ACP conversation  Living Will: No  Power of : Yes  La[place]: No    What is most important right now is to focus on remaining as independent as possible    Accordingly, we have decided that the best  plan to meet the patient's goals includes continuing with treatment      What matters most to patient today is: Able to do for himself.          ROS:  General: -fever, -chills, -fatigue, +weight gain, -weight loss  Eyes: -vision changes, -redness, -discharge  ENT: -ear pain, -nasal congestion, -sore throat, +ear pruritus, +difficulty hearing, +hearing loss  Cardiovascular: -chest pain, -palpitations, -lower extremity edema  Respiratory: +cough, -shortness of breath  Gastrointestinal: -abdominal pain, -nausea, -vomiting, -diarrhea, -constipation, -blood in stool, +increased appetite  Genitourinary: -dysuria, -hematuria, -frequency  Musculoskeletal: -joint pain, -muscle pain, +limb pain, +pain with movement  Skin: -rash, -lesion  Neurological: -headache, +dizziness, -numbness, -tingling, +vertigo  Psychiatric: -anxiety, -depression, -sleep difficulty          Past Medical History:   Diagnosis Date    Cataract     COVID-19 01/27/2022    GERD (gastroesophageal reflux disease)     Primary hypertension 12/22/2024    Trigeminal neuralgia     Type 2 diabetes mellitus with hyperglycemia, without long-term current use of insulin 5/26/2021       Past Surgical History:   Procedure Laterality Date    CATARACT EXTRACTION         Family History   Problem Relation Name Age of Onset    No Known Problems Mother      No Known Problems Father      Amblyopia Neg Hx      Blindness Neg Hx      Cancer Neg Hx      Cataracts Neg Hx      Diabetes Neg Hx      Glaucoma Neg Hx      Hypertension Neg Hx      Macular degeneration Neg Hx      Retinal detachment Neg Hx      Strabismus Neg Hx      Stroke Neg Hx      Thyroid disease Neg Hx         Social History     Socioeconomic History    Marital status:    Tobacco Use    Smoking status: Never    Smokeless tobacco: Never   Substance and Sexual Activity    Alcohol use: No    Drug use: No       Current Medications[1]    Review of patient's allergies indicates:  No Known Allergies      Objective:  "      Last 3 sets of Vitals        5/15/2025     2:58 PM 6/3/2025     2:38 PM 6/17/2025     9:13 AM   Vitals - 1 value per visit   SYSTOLIC 114  128   DIASTOLIC 67  82   Pulse 80  80   SPO2   95 %   Weight (lb) 153.44  179.01   Weight (kg) 69.6  81.2   Height   5' 6" (1.676 m)   BMI (Calculated)   28.9   Pain Score Zero Zero Zero   Physical Exam  Constitutional:       General: He is not in acute distress.  HENT:      Head: Normocephalic.      Right Ear: Tympanic membrane, ear canal and external ear normal.      Left Ear: Tympanic membrane, ear canal and external ear normal.      Nose: Nose normal.      Mouth/Throat:      Mouth: Mucous membranes are moist.   Eyes:      General: No scleral icterus.     Extraocular Movements: Extraocular movements intact.      Conjunctiva/sclera: Conjunctivae normal.   Neck:      Vascular: No carotid bruit.      Comments: No goiter.  Cardiovascular:      Rate and Rhythm: Normal rate and regular rhythm.      Pulses: Normal pulses.      Heart sounds: Normal heart sounds.   Pulmonary:      Effort: Pulmonary effort is normal.      Breath sounds: Normal breath sounds.   Abdominal:      General: Bowel sounds are normal. There is no distension.      Palpations: Abdomen is soft. There is no mass.      Tenderness: There is no abdominal tenderness.   Musculoskeletal:         General: No swelling.   Lymphadenopathy:      Cervical: No cervical adenopathy.   Skin:     General: Skin is warm and dry.   Neurological:      General: No focal deficit present.      Mental Status: He is alert. Mental status is at baseline.   Psychiatric:         Behavior: Behavior normal.      Comments: Tearful at the end of the visit.           CBC:  Recent Labs   Lab 05/12/23  1620 03/22/24  0824 12/13/24  0829   WBC 6.66 7.94 7.40   RBC 5.05 5.06 4.88   Hemoglobin 14.0 13.8 L 13.6 L   Hematocrit 42.2 41.3 41.1   Platelets 220 254 229   MCV 84 82 84   MCH 27.7 27.3 27.9   MCHC 33.2 33.4 33.1     CMP:  Recent Labs   Lab " 06/14/24 0815 12/13/24 0829 05/02/25 0824   Glucose 133 H 135 H 128 H   Calcium 9.3 9.5 8.6 L   Albumin 3.7 4.0 3.6   Protein Total  --   --  7.3   Total Protein 7.4 7.5  --    Sodium 144 136 138   Potassium 3.9 4.1 4.1   CO2 23 24 20 L   Chloride 110 100 108   BUN 21 12 16   Creatinine 1.1 1.0 1.0   Alkaline Phosphatase 99 102  --    ALP  --   --  94   ALT 10 12 13   AST 12 16 11   Total Bilirubin 0.3 0.3  --    Bilirubin Total  --   --  0.3     URINALYSIS:  Recent Labs   Lab 05/02/25 0824   Color, UA Yellow   Spec Grav UA 1.030   pH, UA 5.0   Protein, UA Negative   Nitrites, UA Negative   Leukocyte Esterase, UA Negative   Urobilinogen, UA Negative      LIPIDS:  Recent Labs   Lab 05/12/23 1620 03/22/24 0824 06/14/24 0815 05/02/25 0824   TSH 1.498 3.727  --   --    HDL 49 51 47  --    HDL Cholesterol  --   --   --  42   Cholesterol Total  --   --   --  124   Cholesterol 205 H 208 H 130  --    Triglycerides 119 91 67  --    Triglyceride  --   --   --  90   LDL Cholesterol 132.2 138.8 69.6 64.0   HDL/Cholesterol Ratio 23.9 24.5 36.2 33.9   Non-HDL Cholesterol 156 157 83  --    Non HDL Cholesterol  --   --   --  82   Total Cholesterol/HDL Ratio 4.2 4.1 2.8  --    Cholesterol/HDL Ratio  --   --   --  3.0     TSH:  Recent Labs   Lab 05/12/23 1620 03/22/24 0824   TSH 1.498 3.727       A1C:  Recent Labs   Lab 10/18/22  0822 05/12/23  1620 03/22/24  0824 06/14/24  0815 12/13/24  0829 05/02/25  0824   Hemoglobin A1C 6.2 H 6.2 H 6.6 H 6.6 H 6.7 H  --    Hemoglobin A1c  --   --   --   --   --  6.6 H       Imaging:  X-ray Shoulder 2 or More Views Right  Narrative: EXAMINATION:  XR SHOULDER COMPLETE 2 OR MORE VIEWS RIGHT    CLINICAL HISTORY:  Pain in right shoulder    FINDINGS:  Shoulder complete three views right.    There is baseline DJD.  No fracture, dislocation, or bone destruction seen.  No trauma seen.  No acute process seen.  Impression: No acute process seen.    Electronically signed by: Clif Peters,  MD  Date:    05/07/2025  Time:    11:09      Assessment:       1. Type 2 diabetes mellitus without complication, without long-term current use of insulin    2. Right shoulder pain, unspecified chronicity    3. Primary hypertension    4. Otitis, unspecified laterality    5. Healthcare maintenance            Plan:       1. Type 2 diabetes mellitus without complication, without long-term current use of insulin  Overview:  On Metformin  mg daily.     Assessment & Plan:  - Last A1C was less than 6.6, indicating good diabetes control.  - Continued Metformin for management.  - Ordered labs to monitor medication effects, to be completed before next visit.    Orders:  -     Hemoglobin A1C; Standing  -     Comprehensive Metabolic Panel; Standing    2. Right shoulder pain, unspecified chronicity  Assessment & Plan:  - Norman reports shoulder pain, especially with full extension backward.  - Orthopedic physician diagnosed a complete ligament tear and recommended physical therapy.  - Recovery is expected to be slow, potentially taking months.  - Referred to physical therapy for shoulder rehabilitation with emphasis on proper technique and maintaining strength and function.  - Norman is taking Celecoxib daily.  - Recommend alternating between Celebrex and Tylenol: Celebrex one day, Tylenol the next, to assess comparative efficacy.  - Prescribed lidocaine patches for pain as needed.  - Will consider injection if pain persists after physical therapy.  - Norman advised to use a cane for balance if needed without becoming dependent, and to continue independent daily activities (dressing, bathing) as able to maintain function.    Orders:  -     celecoxib (CELEBREX) 100 MG capsule; Take 1 capsule (100 mg total) by mouth daily as needed for Pain.  Dispense: 30 capsule; Refill: 0  -     LIDOcaine (LIDODERM) 5 %; Place 1 patch onto the skin once daily. Remove & Discard patch within 12 hours or as directed by MD  Dispense: 30  patch; Refill: 3    3. Primary hypertension  Overview:  On losartan 12.5 mg daily    Assessment & Plan:  Stable blooe pressure.   Continue losartan 12.5 mg daily.       Orders:  -     losartan (COZAAR) 25 MG tablet; Take 0.5 tablets (12.5 mg total) by mouth once daily.  Dispense: 45 tablet; Refill: 3    4. Otitis, unspecified laterality  Assessment & Plan:  - Norman reports itching of both ears, especially when scratched.  - Examination revealed no visible irritation, fungus, or infection.  - Continued treatment with ear drops twice daily and cream.        5. Healthcare maintenance  Assessment & Plan:  - Yearly labs- 5/2/25  - Colon cancer screening- no longer indicated  - ACP-completed HPOA 6/20/24, 2/14/25.  - Eye exam- last on 2022, referral placed.  Has appointment.  - Foot exam- done on 05/7/25  - Vaccination- due for RSV, COVID booster.         Assessment & Plan    > Assessed shoulder pain, noting good strength but persistent discomfort with certain movements.  > Recommend physical therapy as initial treatment approach before considering injection.  > Evaluated ear discomfort and discussed potential causes, including possible relation to hearing loss.  > Considered oral antihistamine for ear itching, but noted concern for drowsiness.  > Reviewed current medications and adjusted as needed.      FOLLOW-UP:  - Scheduled follow up in 8 weeks to reassess shoulder condition and review lab results.  - Norman advised to contact office if shoulder pain significantly worsens before next appointment.        Health Maintenance Due   Topic Date Due    RSV Vaccine (Age 60+ and Pregnant patients) (1 - 1-dose 75+ series) Never done    COVID-19 Vaccine (4 - 2024-25 season) 09/01/2024        I spent a total of 40 minutes on the day of the visit.This includes face to face time and non-face to face time preparing to see the patient (eg, review of tests), obtaining and/or reviewing separately obtained history, documenting  clinical information in the electronic or other health record, independently interpreting results and communicating results to the patient/family/caregiver, or care coordinator.     RETURN TO CLINIC IN: 2 months    FOR NEXT VISIT: MEDICATION MONITORING       Darshana Brandon MD  [facility]  Disclaimer:  This note has been generated using voice-recognition software. There may be grammatical or spelling errors that have been missed during proof-reading           [1]   Current Outpatient Medications   Medication Sig Dispense Refill    acetic acid (VOSOL) 2 % otic solution Place 4 drops into both ears 3 (three) times daily as needed (itching). 6 mL 1    aspirin (ECOTRIN) 81 MG EC tablet Take 1 tablet (81 mg total) by mouth once daily. 30 tablet 11    atorvastatin (LIPITOR) 20 MG tablet Take 1 tablet (20 mg total) by mouth once daily. 90 tablet 3    blood sugar diagnostic Strp To check BG 2 times daily, to use with insurance preferred meter 100 strip 11    cetirizine (ZYRTEC) 10 MG tablet Take 1 tablet (10 mg total) by mouth daily as needed for Allergies. 30 tablet 2    fluocinolone acetonide oiL (DERMOTIC OIL) 0.01 % Drop Place 3 drops in ear(s) 2 (two) times daily. 20 mL 3    fluticasone propionate (FLONASE) 50 mcg/actuation nasal spray 2 sprays (100 mcg total) by Each Nostril route once daily. 16 g 2    lancets Misc To check BG 2 times daily, to use with insurance preferred meter 100 each 11    meclizine (ANTIVERT) 12.5 mg tablet Take 1 tablet (12.5 mg total) by mouth 3 (three) times daily as needed for Dizziness. 60 tablet 2    metFORMIN (GLUCOPHAGE-XR) 500 MG ER 24hr tablet TOME JAK TABLETA TODOS LOS SWENSON CON EL DESAYUNO 90 tablet 2    nystatin-triamcinolone (MYCOLOG) ointment Apply topically 2 (two) times daily. 15 g 0    omeprazole (PRILOSEC) 20 MG capsule Take 1 capsule (20 mg total) by mouth once daily. Para el reflujo. 90 capsule 3    OXcarbazepine (TRILEPTAL) 300 MG Tab TOME 1 TABLETA DOS VECES AL GRANT 180  tablet 2    tamsulosin (FLOMAX) 0.4 mg Cap Take 1 capsule (0.4 mg total) by mouth once daily. 90 capsule 3    blood-glucose meter kit To check BG 2 times daily, to use with insurance preferred meter (Patient not taking: Reported on 1/9/2025) 1 each 5    celecoxib (CELEBREX) 100 MG capsule Take 1 capsule (100 mg total) by mouth daily as needed for Pain. 30 capsule 0    LIDOcaine (LIDODERM) 5 % Place 1 patch onto the skin once daily. Remove & Discard patch within 12 hours or as directed by MD 30 patch 3    losartan (COZAAR) 25 MG tablet Take 0.5 tablets (12.5 mg total) by mouth once daily. 45 tablet 3     No current facility-administered medications for this visit.

## 2025-06-20 ENCOUNTER — TELEPHONE (OUTPATIENT)
Dept: PRIMARY CARE CLINIC | Facility: CLINIC | Age: OVER 89
End: 2025-06-20
Payer: MEDICARE

## 2025-07-15 DIAGNOSIS — E78.49 OTHER HYPERLIPIDEMIA: ICD-10-CM

## 2025-07-16 RX ORDER — ATORVASTATIN CALCIUM 20 MG/1
20 TABLET, FILM COATED ORAL DAILY
Qty: 90 TABLET | Refills: 3 | Status: SHIPPED | OUTPATIENT
Start: 2025-07-16 | End: 2026-07-16

## 2025-07-31 DIAGNOSIS — E11.00 TYPE 2 DIABETES MELLITUS WITH HYPEROSMOLARITY WITHOUT COMA, WITHOUT LONG-TERM CURRENT USE OF INSULIN: ICD-10-CM

## 2025-07-31 RX ORDER — METFORMIN HYDROCHLORIDE 500 MG/1
500 TABLET, EXTENDED RELEASE ORAL
Qty: 90 TABLET | Refills: 2 | Status: SHIPPED | OUTPATIENT
Start: 2025-07-31

## 2025-08-15 ENCOUNTER — LAB VISIT (OUTPATIENT)
Dept: LAB | Facility: HOSPITAL | Age: OVER 89
End: 2025-08-15
Attending: INTERNAL MEDICINE
Payer: MEDICARE

## 2025-08-15 DIAGNOSIS — E11.9 TYPE 2 DIABETES MELLITUS WITHOUT COMPLICATION, WITHOUT LONG-TERM CURRENT USE OF INSULIN: ICD-10-CM

## 2025-08-15 LAB
ALBUMIN SERPL BCP-MCNC: 3.8 G/DL (ref 3.5–5.2)
ALP SERPL-CCNC: 84 UNIT/L (ref 40–150)
ALT SERPL W/O P-5'-P-CCNC: 8 UNIT/L (ref 10–44)
ANION GAP (OHS): 11 MMOL/L (ref 8–16)
AST SERPL-CCNC: 15 UNIT/L (ref 11–45)
BILIRUB SERPL-MCNC: 0.3 MG/DL (ref 0.1–1)
BUN SERPL-MCNC: 16 MG/DL (ref 10–30)
CALCIUM SERPL-MCNC: 9 MG/DL (ref 8.7–10.5)
CHLORIDE SERPL-SCNC: 106 MMOL/L (ref 95–110)
CO2 SERPL-SCNC: 23 MMOL/L (ref 23–29)
CREAT SERPL-MCNC: 1 MG/DL (ref 0.5–1.4)
EAG (OHS): 143 MG/DL (ref 68–131)
GFR SERPLBLD CREATININE-BSD FMLA CKD-EPI: >60 ML/MIN/1.73/M2
GLUCOSE SERPL-MCNC: 119 MG/DL (ref 70–110)
HBA1C MFR BLD: 6.6 % (ref 4–5.6)
POTASSIUM SERPL-SCNC: 4.1 MMOL/L (ref 3.5–5.1)
PROT SERPL-MCNC: 6.9 GM/DL (ref 6–8.4)
SODIUM SERPL-SCNC: 140 MMOL/L (ref 136–145)

## 2025-08-15 PROCEDURE — 83036 HEMOGLOBIN GLYCOSYLATED A1C: CPT | Mod: HCNC

## 2025-08-15 PROCEDURE — 36415 COLL VENOUS BLD VENIPUNCTURE: CPT | Mod: HCNC

## 2025-08-15 PROCEDURE — 82247 BILIRUBIN TOTAL: CPT | Mod: HCNC

## 2025-08-19 ENCOUNTER — OFFICE VISIT (OUTPATIENT)
Dept: PRIMARY CARE CLINIC | Facility: CLINIC | Age: OVER 89
End: 2025-08-19
Payer: MEDICARE

## 2025-08-19 VITALS
SYSTOLIC BLOOD PRESSURE: 126 MMHG | OXYGEN SATURATION: 95 % | BODY MASS INDEX: 28.08 KG/M2 | DIASTOLIC BLOOD PRESSURE: 84 MMHG | HEIGHT: 66 IN | HEART RATE: 75 BPM | WEIGHT: 174.69 LBS

## 2025-08-19 DIAGNOSIS — E78.5 HYPERLIPIDEMIA, UNSPECIFIED HYPERLIPIDEMIA TYPE: ICD-10-CM

## 2025-08-19 DIAGNOSIS — M25.511 RIGHT SHOULDER PAIN, UNSPECIFIED CHRONICITY: Primary | ICD-10-CM

## 2025-08-19 DIAGNOSIS — I10 PRIMARY HYPERTENSION: ICD-10-CM

## 2025-08-19 DIAGNOSIS — H60.63 CHRONIC OTITIS EXTERNA OF BOTH EARS, UNSPECIFIED TYPE: Chronic | ICD-10-CM

## 2025-08-19 DIAGNOSIS — Z00.00 HEALTHCARE MAINTENANCE: ICD-10-CM

## 2025-08-19 DIAGNOSIS — R26.81 UNSTEADY GAIT: ICD-10-CM

## 2025-08-19 DIAGNOSIS — E11.00 TYPE 2 DIABETES MELLITUS WITH HYPEROSMOLARITY WITHOUT COMA, WITHOUT LONG-TERM CURRENT USE OF INSULIN: ICD-10-CM

## 2025-08-19 PROCEDURE — 1101F PT FALLS ASSESS-DOCD LE1/YR: CPT | Mod: CPTII,HCNC,S$GLB, | Performed by: INTERNAL MEDICINE

## 2025-08-19 PROCEDURE — 99999 PR PBB SHADOW E&M-EST. PATIENT-LVL V: CPT | Mod: PBBFAC,HCNC,, | Performed by: INTERNAL MEDICINE

## 2025-08-19 PROCEDURE — 1160F RVW MEDS BY RX/DR IN RCRD: CPT | Mod: CPTII,HCNC,S$GLB, | Performed by: INTERNAL MEDICINE

## 2025-08-19 PROCEDURE — 1126F AMNT PAIN NOTED NONE PRSNT: CPT | Mod: CPTII,HCNC,S$GLB, | Performed by: INTERNAL MEDICINE

## 2025-08-19 PROCEDURE — 3288F FALL RISK ASSESSMENT DOCD: CPT | Mod: CPTII,HCNC,S$GLB, | Performed by: INTERNAL MEDICINE

## 2025-08-19 PROCEDURE — 99215 OFFICE O/P EST HI 40 MIN: CPT | Mod: HCNC,S$GLB,, | Performed by: INTERNAL MEDICINE

## 2025-08-19 PROCEDURE — 1159F MED LIST DOCD IN RCRD: CPT | Mod: CPTII,HCNC,S$GLB, | Performed by: INTERNAL MEDICINE

## 2025-08-19 PROCEDURE — 1157F ADVNC CARE PLAN IN RCRD: CPT | Mod: CPTII,HCNC,S$GLB, | Performed by: INTERNAL MEDICINE

## 2025-08-19 RX ORDER — CELECOXIB 100 MG/1
100 CAPSULE ORAL DAILY PRN
Qty: 30 CAPSULE | Refills: 0 | Status: SHIPPED | OUTPATIENT
Start: 2025-08-19

## 2025-08-19 RX ORDER — FLUOCINOLONE ACETONIDE 0.11 MG/ML
3 OIL AURICULAR (OTIC) 2 TIMES DAILY
Qty: 20 ML | Refills: 3 | Status: SHIPPED | OUTPATIENT
Start: 2025-08-19

## 2025-09-05 DIAGNOSIS — N40.1 BENIGN PROSTATIC HYPERPLASIA WITH NOCTURIA: ICD-10-CM

## 2025-09-05 DIAGNOSIS — R35.1 BENIGN PROSTATIC HYPERPLASIA WITH NOCTURIA: ICD-10-CM

## 2025-09-05 DIAGNOSIS — R35.0 FREQUENCY OF URINATION: ICD-10-CM

## 2025-09-05 RX ORDER — TAMSULOSIN HYDROCHLORIDE 0.4 MG/1
1 CAPSULE ORAL DAILY
Qty: 90 CAPSULE | Refills: 3 | Status: SHIPPED | OUTPATIENT
Start: 2025-09-05